# Patient Record
Sex: FEMALE | Race: WHITE | NOT HISPANIC OR LATINO | Employment: FULL TIME | URBAN - METROPOLITAN AREA
[De-identification: names, ages, dates, MRNs, and addresses within clinical notes are randomized per-mention and may not be internally consistent; named-entity substitution may affect disease eponyms.]

---

## 2023-01-19 ENCOUNTER — TRANSFERRED RECORDS (OUTPATIENT)
Dept: MULTI SPECIALTY CLINIC | Facility: CLINIC | Age: 23
End: 2023-01-19

## 2023-01-19 LAB — PAP SMEAR - HIM PATIENT REPORTED: NORMAL

## 2023-07-06 ENCOUNTER — TRANSFERRED RECORDS (OUTPATIENT)
Dept: MULTI SPECIALTY CLINIC | Facility: CLINIC | Age: 23
End: 2023-07-06

## 2023-07-06 LAB — CHLAMYDIA - HIM PATIENT REPORTED: NORMAL

## 2023-12-13 ENCOUNTER — TELEPHONE (OUTPATIENT)
Dept: OPHTHALMOLOGY | Facility: CLINIC | Age: 23
End: 2023-12-13
Payer: COMMERCIAL

## 2023-12-31 ENCOUNTER — HEALTH MAINTENANCE LETTER (OUTPATIENT)
Age: 23
End: 2023-12-31

## 2024-01-06 PROCEDURE — 99283 EMERGENCY DEPT VISIT LOW MDM: CPT | Performed by: EMERGENCY MEDICINE

## 2024-01-06 PROCEDURE — 99284 EMERGENCY DEPT VISIT MOD MDM: CPT | Mod: 25 | Performed by: EMERGENCY MEDICINE

## 2024-01-07 ENCOUNTER — HOSPITAL ENCOUNTER (EMERGENCY)
Facility: CLINIC | Age: 24
Discharge: HOME | End: 2024-01-07
Attending: EMERGENCY MEDICINE | Admitting: EMERGENCY MEDICINE
Payer: COMMERCIAL

## 2024-01-07 ENCOUNTER — APPOINTMENT (OUTPATIENT)
Dept: GENERAL RADIOLOGY | Facility: CLINIC | Age: 24
End: 2024-01-07
Attending: EMERGENCY MEDICINE
Payer: COMMERCIAL

## 2024-01-07 VITALS
SYSTOLIC BLOOD PRESSURE: 146 MMHG | TEMPERATURE: 97.5 F | RESPIRATION RATE: 25 BRPM | HEART RATE: 123 BPM | OXYGEN SATURATION: 95 % | DIASTOLIC BLOOD PRESSURE: 91 MMHG

## 2024-01-07 DIAGNOSIS — R05.3 CHRONIC COUGH: ICD-10-CM

## 2024-01-07 DIAGNOSIS — J45.30 MILD PERSISTENT ASTHMA WITHOUT COMPLICATION: ICD-10-CM

## 2024-01-07 LAB
FLUAV RNA SPEC QL NAA+PROBE: NEGATIVE
FLUBV RNA RESP QL NAA+PROBE: NEGATIVE
RSV RNA SPEC NAA+PROBE: NEGATIVE
SARS-COV-2 RNA RESP QL NAA+PROBE: NEGATIVE

## 2024-01-07 PROCEDURE — 71046 X-RAY EXAM CHEST 2 VIEWS: CPT | Mod: 26 | Performed by: RADIOLOGY

## 2024-01-07 PROCEDURE — 87637 SARSCOV2&INF A&B&RSV AMP PRB: CPT | Performed by: EMERGENCY MEDICINE

## 2024-01-07 PROCEDURE — 71046 X-RAY EXAM CHEST 2 VIEWS: CPT

## 2024-01-07 ASSESSMENT — ACTIVITIES OF DAILY LIVING (ADL): ADLS_ACUITY_SCORE: 33

## 2024-01-07 NOTE — ED TRIAGE NOTES
Pt has hx of asthma and has had a recent cold which is exacerbating symptoms, took steriods and abx. Unable to sleep d/t tachycardia and SOB. LS clear. Has home neb and took one around 1800. VSS-HTN.

## 2024-01-07 NOTE — ED PROVIDER NOTES
[facility] EMERGENCY DEPARTMENT ([facility])    1/07/24       ED PROVIDER NOTE    History     Chief Complaint   Patient presents with    Asthma Exacerbation     HPI  Idalmis Swann is a 23 year old female with a history of asthma here saying that she has long-haul COVID and this plus her asthma has caused a chronic cough and she has difficulty sleeping, particularly when she lies flat.  She does have an inhaler and neb machine at home. She has been seen multiple times placed on 2 courses of steroids as well as given azithromycin and apparently none of this has been helpful. She recently moved here and does not have a primary care provider. She has done a neb today.  Her cough is nonproductive.    This part of the medical record was transcribed by TOMAS ALSTON Medical Scribe, from a dictation done by Louis Hoffman MD.     Past Medical History  No past medical history on file.  No past surgical history on file.  dextromethorphan (TUSSIN COUGH) 15 MG/5ML syrup      Allergies   Allergen Reactions    Bactrim [Sulfamethoxazole-Trimethoprim]     Elemental Sulfur     Penicillins      Family History  No family history on file.  Social History          A medically appropriate review of systems was performed with pertinent positives and negatives noted in the HPI, and all other systems negative.    Physical Exam   BP: (!) 146/91  Pulse: (!) 123  Temp: 97.5  F (36.4  C)  Resp: 25  SpO2: 95 %  Physical Exam  Vitals and nursing note reviewed.   Constitutional:       General: She is not in acute distress.     Appearance: She is well-developed.   HENT:      Head: Normocephalic and atraumatic.   Cardiovascular:      Rate and Rhythm: Normal rate and regular rhythm.      Heart sounds: Normal heart sounds.   Pulmonary:      Effort: Pulmonary effort is normal. No respiratory distress.      Breath sounds: Normal breath sounds. No wheezing.   Skin:     General: Skin is warm and dry.   Neurological:      General:  No focal deficit present.      Mental Status: She is alert and oriented to person, place, and time.   Psychiatric:         Mood and Affect: Mood normal.         Behavior: Behavior normal.         ED Course, Procedures, & Data      Procedures                   Results for orders placed or performed during the hospital encounter of 01/07/24   XR Chest 2 Views     Status: None    Narrative    EXAM: XR CHEST 2 VIEWS  LOCATION: [facility]  DATE: 1/7/2024    INDICATION: Cough  COMPARISON: None.      Impression    IMPRESSION: Negative chest.   Symptomatic Influenza A/B, RSV, & SARS-CoV2 PCR (COVID-19) Nose     Status: Normal    Specimen: Nose; Swab   Result Value Ref Range    Influenza A PCR Negative Negative    Influenza B PCR Negative Negative    RSV PCR Negative Negative    SARS CoV2 PCR Negative Negative    Narrative    Testing was performed using the Xpert Xpress CoV2/Flu/RSV Assay on the Cepheid GeneXpert Instrument. This test should be ordered for the detection of SARS-CoV-2, influenza, and RSV viruses in individuals who meet clinical and/or epidemiological criteria. Test performance is unknown in asymptomatic patients. This test is for in vitro diagnostic use under the FDA EUA for laboratories certified under CLIA to perform high or moderate complexity testing. This test has not been FDA cleared or approved. A negative result does not rule out the presence of PCR inhibitors in the specimen or target RNA in concentration below the limit of detection for the assay. If only one viral target is positive but coinfection with multiple targets is suspected, the sample should be re-tested with another FDA cleared, approved, or authorized test, if coinfection would change clinical management. This test was validated by the [facility]. These laboratories are certified under the Clinical Laboratory Improvement Amendments of 1988 (CLIA-88) as qualified to  perform high complexity laboratory testing.     Medications - No data to display  Labs Ordered and Resulted from Time of ED Arrival to Time of ED Departure   INFLUENZA A/B, RSV, & SARS-COV2 PCR - Normal       Result Value    Influenza A PCR Negative      Influenza B PCR Negative      RSV PCR Negative      SARS CoV2 PCR Negative       XR Chest 2 Views   Final Result   IMPRESSION: Negative chest.             Critical care was not performed.     Medical Decision Making  The patient's presentation was of straightforward complexity (a clearly self-limited or minor problem).    The patient's evaluation involved:  ordering and/or review of 3+ test(s) in this encounter (chest x-ray, influenza, COVID)    The patient's management necessitated moderate risk (prescription drug management including medications given in the ED).    Assessment & Plan    23-year-old female with history of asthma here frustrated that her cough is keeping her awake at night.  Her chest x-ray is normal she does not have a lot of wheezing on exam and her COVID and influenza were negative.  Will provide her with a prescription for Robitussin AC to use at night as a cough suppressant.    I have reviewed the nursing notes. I have reviewed the findings, diagnosis, plan and need for follow up with the patient.    New Prescriptions    DEXTROMETHORPHAN (TUSSIN COUGH) 15 MG/5ML SYRUP    Take 10 mLs (30 mg) by mouth nightly as needed for cough       Final diagnoses:   Chronic cough   Mild persistent asthma without complication   ITOMAS am serving as a trained medical scribe to document services personally performed by Louis Hoffman MD, based on the provider's statements to me.      I, Louis Hoffman MD, was physically present and have reviewed and verified the accuracy of this note documented by TOMAS ALSTON.       Louis Hoffman MD  [facility] EMERGENCY DEPARTMENT  1/6/2024     Louis Hoffman,  MD  01/07/24 0139

## 2024-02-01 ASSESSMENT — ASTHMA QUESTIONNAIRES
EMERGENCY_ROOM_LAST_YEAR_TOTAL: TWO
QUESTION_2 LAST FOUR WEEKS HOW OFTEN HAVE YOU HAD SHORTNESS OF BREATH: ONCE OR TWICE A WEEK
QUESTION_1 LAST FOUR WEEKS HOW MUCH OF THE TIME DID YOUR ASTHMA KEEP YOU FROM GETTING AS MUCH DONE AT WORK, SCHOOL OR AT HOME: A LITTLE OF THE TIME
QUESTION_3 LAST FOUR WEEKS HOW OFTEN DID YOUR ASTHMA SYMPTOMS (WHEEZING, COUGHING, SHORTNESS OF BREATH, CHEST TIGHTNESS OR PAIN) WAKE YOU UP AT NIGHT OR EARLIER THAN USUAL IN THE MORNING: ONCE A WEEK
ACT_TOTALSCORE: 17
QUESTION_5 LAST FOUR WEEKS HOW WOULD YOU RATE YOUR ASTHMA CONTROL: SOMEWHAT CONTROLLED
QUESTION_4 LAST FOUR WEEKS HOW OFTEN HAVE YOU USED YOUR RESCUE INHALER OR NEBULIZER MEDICATION (SUCH AS ALBUTEROL): TWO OR THREE TIMES PER WEEK
ACT_TOTALSCORE: 17

## 2024-02-07 ENCOUNTER — OFFICE VISIT (OUTPATIENT)
Dept: FAMILY MEDICINE | Facility: CLINIC | Age: 24
End: 2024-02-07
Payer: COMMERCIAL

## 2024-02-07 VITALS
BODY MASS INDEX: 35.26 KG/M2 | DIASTOLIC BLOOD PRESSURE: 80 MMHG | HEIGHT: 66 IN | SYSTOLIC BLOOD PRESSURE: 122 MMHG | TEMPERATURE: 97.7 F | OXYGEN SATURATION: 98 % | HEART RATE: 100 BPM | RESPIRATION RATE: 18 BRPM | WEIGHT: 219.4 LBS

## 2024-02-07 DIAGNOSIS — Z91.018 NUT ALLERGY: ICD-10-CM

## 2024-02-07 DIAGNOSIS — J45.20 MILD INTERMITTENT ASTHMA WITHOUT COMPLICATION: ICD-10-CM

## 2024-02-07 DIAGNOSIS — Z13.21 ENCOUNTER FOR VITAMIN DEFICIENCY SCREENING: ICD-10-CM

## 2024-02-07 DIAGNOSIS — Z11.4 SCREENING FOR HIV (HUMAN IMMUNODEFICIENCY VIRUS): ICD-10-CM

## 2024-02-07 DIAGNOSIS — Z11.59 NEED FOR HEPATITIS C SCREENING TEST: ICD-10-CM

## 2024-02-07 DIAGNOSIS — D50.8 OTHER IRON DEFICIENCY ANEMIA: ICD-10-CM

## 2024-02-07 DIAGNOSIS — Z13.220 SCREENING FOR HYPERLIPIDEMIA: ICD-10-CM

## 2024-02-07 DIAGNOSIS — Z13.29 SCREENING FOR THYROID DISORDER: ICD-10-CM

## 2024-02-07 DIAGNOSIS — E66.01 CLASS 2 SEVERE OBESITY DUE TO EXCESS CALORIES WITH SERIOUS COMORBIDITY AND BODY MASS INDEX (BMI) OF 35.0 TO 35.9 IN ADULT (H): ICD-10-CM

## 2024-02-07 DIAGNOSIS — Z11.3 SCREEN FOR STD (SEXUALLY TRANSMITTED DISEASE): ICD-10-CM

## 2024-02-07 DIAGNOSIS — E66.812 CLASS 2 SEVERE OBESITY DUE TO EXCESS CALORIES WITH SERIOUS COMORBIDITY AND BODY MASS INDEX (BMI) OF 35.0 TO 35.9 IN ADULT (H): ICD-10-CM

## 2024-02-07 DIAGNOSIS — Z00.00 ROUTINE GENERAL MEDICAL EXAMINATION AT A HEALTH CARE FACILITY: Primary | ICD-10-CM

## 2024-02-07 DIAGNOSIS — N80.9 ENDOMETRIOSIS: ICD-10-CM

## 2024-02-07 PROBLEM — M50.30 DISCOGENIC CERVICAL PAIN: Status: ACTIVE | Noted: 2020-10-28

## 2024-02-07 PROBLEM — J45.909 REACTIVE AIRWAY DISEASE: Status: ACTIVE | Noted: 2022-09-07

## 2024-02-07 PROBLEM — Z74.09 COVID-19 LONG HAULER MANIFESTING CHRONIC DECREASED MOBILITY AND ENDURANCE: Status: ACTIVE | Noted: 2022-12-12

## 2024-02-07 PROBLEM — U09.9 CHRONIC POST-COVID-19 SYNDROME: Status: ACTIVE | Noted: 2022-09-07

## 2024-02-07 PROBLEM — J30.2 SEASONAL ALLERGIC RHINITIS: Status: ACTIVE | Noted: 2022-05-04

## 2024-02-07 PROBLEM — M43.07 SPONDYLOLYSIS OF LUMBOSACRAL REGION: Status: ACTIVE | Noted: 2019-02-23

## 2024-02-07 PROBLEM — E78.2 MIXED DYSLIPIDEMIA: Status: ACTIVE | Noted: 2020-11-25

## 2024-02-07 PROBLEM — N83.209 HEMORRHAGIC OVARIAN CYST: Status: ACTIVE | Noted: 2021-03-03

## 2024-02-07 PROBLEM — U09.9 COVID-19 LONG HAULER MANIFESTING CHRONIC DECREASED MOBILITY AND ENDURANCE: Status: ACTIVE | Noted: 2022-12-12

## 2024-02-07 PROBLEM — G47.09 OTHER INSOMNIA: Status: ACTIVE | Noted: 2021-11-01

## 2024-02-07 PROBLEM — E01.0 THYROMEGALY: Status: ACTIVE | Noted: 2021-11-01

## 2024-02-07 PROBLEM — M22.42 CHONDROMALACIA PATELLAE, LEFT KNEE: Status: ACTIVE | Noted: 2020-10-28

## 2024-02-07 PROBLEM — G43.119 INTRACTABLE MIGRAINE WITH AURA WITHOUT STATUS MIGRAINOSUS: Status: ACTIVE | Noted: 2020-10-28

## 2024-02-07 LAB
C TRACH DNA SPEC QL PROBE+SIG AMP: NEGATIVE
ERYTHROCYTE [DISTWIDTH] IN BLOOD BY AUTOMATED COUNT: 13.4 % (ref 10–15)
HCT VFR BLD AUTO: 39.1 % (ref 35–47)
HGB BLD-MCNC: 13.3 G/DL (ref 11.7–15.7)
HIV 1+2 AB+HIV1 P24 AG SERPL QL IA: NONREACTIVE
MCH RBC QN AUTO: 29 PG (ref 26.5–33)
MCHC RBC AUTO-ENTMCNC: 34 G/DL (ref 31.5–36.5)
MCV RBC AUTO: 85 FL (ref 78–100)
N GONORRHOEA DNA SPEC QL NAA+PROBE: NEGATIVE
PLATELET # BLD AUTO: 360 10E3/UL (ref 150–450)
RBC # BLD AUTO: 4.59 10E6/UL (ref 3.8–5.2)
WBC # BLD AUTO: 13.3 10E3/UL (ref 4–11)

## 2024-02-07 PROCEDURE — 80061 LIPID PANEL: CPT | Performed by: STUDENT IN AN ORGANIZED HEALTH CARE EDUCATION/TRAINING PROGRAM

## 2024-02-07 PROCEDURE — 87491 CHLMYD TRACH DNA AMP PROBE: CPT | Performed by: STUDENT IN AN ORGANIZED HEALTH CARE EDUCATION/TRAINING PROGRAM

## 2024-02-07 PROCEDURE — 87389 HIV-1 AG W/HIV-1&-2 AB AG IA: CPT | Performed by: STUDENT IN AN ORGANIZED HEALTH CARE EDUCATION/TRAINING PROGRAM

## 2024-02-07 PROCEDURE — 99213 OFFICE O/P EST LOW 20 MIN: CPT | Mod: 25 | Performed by: STUDENT IN AN ORGANIZED HEALTH CARE EDUCATION/TRAINING PROGRAM

## 2024-02-07 PROCEDURE — 82306 VITAMIN D 25 HYDROXY: CPT | Performed by: STUDENT IN AN ORGANIZED HEALTH CARE EDUCATION/TRAINING PROGRAM

## 2024-02-07 PROCEDURE — 99385 PREV VISIT NEW AGE 18-39: CPT | Performed by: STUDENT IN AN ORGANIZED HEALTH CARE EDUCATION/TRAINING PROGRAM

## 2024-02-07 PROCEDURE — 84443 ASSAY THYROID STIM HORMONE: CPT | Performed by: STUDENT IN AN ORGANIZED HEALTH CARE EDUCATION/TRAINING PROGRAM

## 2024-02-07 PROCEDURE — 86803 HEPATITIS C AB TEST: CPT | Performed by: STUDENT IN AN ORGANIZED HEALTH CARE EDUCATION/TRAINING PROGRAM

## 2024-02-07 PROCEDURE — 80053 COMPREHEN METABOLIC PANEL: CPT | Performed by: STUDENT IN AN ORGANIZED HEALTH CARE EDUCATION/TRAINING PROGRAM

## 2024-02-07 PROCEDURE — 87591 N.GONORRHOEAE DNA AMP PROB: CPT | Performed by: STUDENT IN AN ORGANIZED HEALTH CARE EDUCATION/TRAINING PROGRAM

## 2024-02-07 PROCEDURE — 36415 COLL VENOUS BLD VENIPUNCTURE: CPT | Performed by: STUDENT IN AN ORGANIZED HEALTH CARE EDUCATION/TRAINING PROGRAM

## 2024-02-07 PROCEDURE — 85027 COMPLETE CBC AUTOMATED: CPT | Performed by: STUDENT IN AN ORGANIZED HEALTH CARE EDUCATION/TRAINING PROGRAM

## 2024-02-07 RX ORDER — EPINEPHRINE 0.3 MG/.3ML
0.3 INJECTION SUBCUTANEOUS PRN
Qty: 2 EACH | Refills: 1 | Status: SHIPPED | OUTPATIENT
Start: 2024-02-07 | End: 2024-06-05

## 2024-02-07 RX ORDER — METFORMIN HCL 500 MG
500 TABLET, EXTENDED RELEASE 24 HR ORAL
Qty: 14 TABLET | Refills: 0 | Status: SHIPPED | OUTPATIENT
Start: 2024-02-07 | End: 2024-02-20

## 2024-02-07 RX ORDER — EPINEPHRINE 0.3 MG/.3ML
0.3 INJECTION SUBCUTANEOUS
COMMUNITY
Start: 2023-02-22 | End: 2024-02-07

## 2024-02-07 RX ORDER — FLUTICASONE PROPIONATE AND SALMETEROL 232; 14 UG/1; UG/1
1 POWDER, METERED RESPIRATORY (INHALATION) 2 TIMES DAILY
Qty: 1 EACH | Refills: 3 | Status: SHIPPED | OUTPATIENT
Start: 2024-02-07

## 2024-02-07 RX ORDER — DROSPIRENONE AND ETHINYL ESTRADIOL 0.02-3(28)
KIT ORAL
COMMUNITY
Start: 2022-05-01 | End: 2024-07-29

## 2024-02-07 NOTE — PATIENT INSTRUCTIONS
Preventive Care Advice   This is general advice given by our system to help you stay healthy. However, your care team may have specific advice just for you. Please talk to your care team about your preventive care needs.  Nutrition  Eat 5 or more servings of fruits and vegetables each day.  Try wheat bread, brown rice and whole grain pasta (instead of white bread, rice, and pasta).  Get enough calcium and vitamin D. Check the label on foods and aim for 100% of the RDA (recommended daily allowance).  Lifestyle  Exercise at least 150 minutes each week  (30 minutes a day, 5 days a week).  Do muscle strengthening activities 2 days a week. These help control your weight and prevent disease.  No smoking.  Wear sunscreen to prevent skin cancer.  Have a dental exam and cleaning every 6 months.  Yearly exams  See your health care team every year to talk about:  Any changes in your health.  Any medicines your care team has prescribed.  Preventive care, family planning, and ways to prevent chronic diseases.  Shots (vaccines)   HPV shots (up to age 26), if you've never had them before.  Hepatitis B shots (up to age 59), if you've never had them before.  COVID-19 shot: Get this shot when it's due.  Flu shot: Get a flu shot every year.  Tetanus shot: Get a tetanus shot every 10 years.  Pneumococcal, hepatitis A, and RSV shots: Ask your care team if you need these based on your risk.  Shingles shot (for age 50 and up)  General health tests  Diabetes screening:  Starting at age 35, Get screened for diabetes at least every 3 years.  If you are younger than age 35, ask your care team if you should be screened for diabetes.  Cholesterol test: At age 39, start having a cholesterol test every 5 years, or more often if advised.  Bone density scan (DEXA): At age 50, ask your care team if you should have this scan for osteoporosis (brittle bones).  Hepatitis C: Get tested at least once in your life.  STIs (sexually transmitted  infections)  Before age 24: Ask your care team if you should be screened for STIs.  After age 24: Get screened for STIs if you're at risk. You are at risk for STIs (including HIV) if:  You are sexually active with more than one person.  You don't use condoms every time.  You or a partner was diagnosed with a sexually transmitted infection.  If you are at risk for HIV, ask about PrEP medicine to prevent HIV.  Get tested for HIV at least once in your life, whether you are at risk for HIV or not.  Cancer screening tests  Cervical cancer screening: If you have a cervix, begin getting regular cervical cancer screening tests starting at age 21.  Breast cancer scan (mammogram): If you've ever had breasts, begin having regular mammograms starting at age 40. This is a scan to check for breast cancer.  Colon cancer screening: It is important to start screening for colon cancer at age 45.  Have a colonoscopy test every 10 years (or more often if you're at risk) Or, ask your provider about stool tests like a FIT test every year or Cologuard test every 3 years.  To learn more about your testing options, visit:   https://www.Netshow.me/728842.pdf.  For help making a decision, visit:   https://bit.ly/gx29568.  Prostate cancer screening test: If you have a prostate, ask your care team if a prostate cancer screening test (PSA) at age 55 is right for you.  Lung cancer screening: If you are a current or former smoker ages 50 to 80, ask your care team if ongoing lung cancer screenings are right for you.  For informational purposes only. Not to replace the advice of your health care provider. Copyright   2023 [employer]. All rights reserved. Clinically reviewed by the [facility] Transitions Program. [employer] [id number] - REV 01/24.

## 2024-02-07 NOTE — PROGRESS NOTES
Preventive Care Visit  [facility], Family Medicine  Feb 7, 2024    Assessment & Plan     (Z00.00) Routine general medical examination at a health care facility  (primary encounter diagnosis)  Comment: Stable  Plan: REVIEW OF HEALTH MAINTENANCE PROTOCOL ORDERS,         Comprehensive metabolic panel (BMP + Alb, Alk         Phos, ALT, AST, Total. Bili, TP)            (Z11.4) Screening for HIV (human immunodeficiency virus)  Comment: Stable  Plan: HIV Antigen Antibody Combo            (Z11.59) Need for hepatitis C screening test  Comment: Stable  Plan: Hepatitis C Screen Reflex to HCV RNA Quant and         Genotype            (N80.9) Endometriosis  Comment: Chronic, uncontrolled.  Patient like to establish care with OB/GYN.  Patient does have concerns for possible PCOS and would like further evaluation.  Will provide referral.  Plan: Ob/Gyn  Referral            (J45.20) Mild intermittent asthma without complication  Comment: Chronic, stable.  Will send patient refill of medication.  Plan: fluticasone-salmeterol (AIRDUO RESPICLICK)         232-14 MCG/ACT inhaler            (Z13.220) Screening for hyperlipidemia  Comment: Stable  Plan: Lipid panel reflex to direct LDL Fasting            (Z11.3) Screen for STD (sexually transmitted disease)  Comment: Stable  Plan: Chlamydia trachomatis/Neisseria gonorrhoeae by         PCR - Clinic Collect            (Z13.29) Screening for thyroid disorder  Comment: Stable  Plan: TSH with free T4 reflex            (D50.8) Other iron deficiency anemia  Comment: Chronic, stable.  Pending lab results today.  Plan: CBC with platelets            (E66.01,  Z68.35) Class 2 severe obesity due to excess calories with serious comorbidity and body mass index (BMI) of 35.0 to 35.9 in adult (H)  Comment: Chronic, uncontrolled.  Discussed concerns as a relates to weight and patient's underlying history of endometriosis.  Patient has been seen by  "nutritionist and understands lifestyle changes are important for overall health.  Did discuss options of weight management medications including metformin, phentermine, topiramate.  Patient in agreement to try metformin at this time and will start off with the 500 mg extended release for 2 weeks.  Patient to notify via Mashed jobshart if tolerance is present and will increase dose to 1000 mg.  Plan: metFORMIN (GLUCOPHAGE XR) 500 MG 24 hr tablet            (Z13.21) Encounter for vitamin deficiency screening  Comment: Stable  Plan: Vitamin D Deficiency            (Z91.018) Nut allergy  Comment: Chronic, stable.  Will send refills of EpiPen.  Plan: EPINEPHrine (ANY BX GENERIC EQUIV) 0.3 MG/0.3ML        injection 2-pack             Dictation Disclaimer: Some of this Note has been completed with voice-recognition dictation software. Although errors are generally corrected real-time, there is the potential for a rare error to be present in the completed chart.             BMI  Estimated body mass index is 35.95 kg/m  as calculated from the following:    Height as of this encounter: 1.664 m (5' 5.5\").    Weight as of this encounter: 99.5 kg (219 lb 6.4 oz).   Weight management plan: Specific weight management program called metformin  discussed          Subjective   Idalmis is a 23 year old, presenting for the following:  Physical        2/7/2024     7:20 AM   Additional Questions   Roomed by Ade        Via the Health Maintenance questionnaire, the patient has reported the following services have been completed -Chlamydia-Cervical Cancer Screening, this information has been sent to the abstraction team.  Health Care Directive  Patient does not have a Health Care Directive or Living Will: Discussed advance care planning with patient; information given to patient to review.    History of Present Illness       Reason for visit:  New patient visit    She eats 4 or more servings of fruits and vegetables daily.She consumes 1 sweetened " beverage(s) daily.She exercises with enough effort to increase her heart rate 60 or more minutes per day.  She exercises with enough effort to increase her heart rate 4 days per week.   She is taking medications regularly.  She is not taking prescribed medications regularly due to None.  Healthy Habits:     Getting at least 3 servings of Calcium per day:  Yes    Bi-annual eye exam:  Yes    Dental care twice a year:  Yes    Sleep apnea or symptoms of sleep apnea:  None    Diet:  Regular (no restrictions)    Frequency of exercise:  4-5 days/week    Duration of exercise:  Greater than 60 minutes    Taking medications regularly:  0    Barriers to taking medications:  None    Medication side effects:  None    Additional concerns today:  Yes    The patient is a 23-year-old female who presents for routine physical and evaluation of medical concerns.    She had some health issues in the past year after she received her COVID-19 shot.  She endorses having stage IV endometriosis and was seeing an OB/GYN in [place] but has not been able to establish care with a provider here who specializes in endometriosis.  She has been taking birth control consistently since she was 15.  She has tried an IUD, but she is allergic to it.  Depo shot her arm concerns her and she has not consider this as an option.  She had some weight issues since she was diagnosed with endometriosis.  She has met with a nutritionist through her insurance.  Her weight is usually 1 60-1 70 however she has noticed that she is the highest that she has been at 219.  She works out 4 times a week and eats a high-protein diet.  She currently now suffers from a nut allergy which surfaced after she received her COVID-19 shot.  She reports undergoing surgery for endometriosis due to seeding into the colon however has not follow-up since that time.  She is hoping to figure out ways to improve her weight as well as alternatives for birth control.  She reports that if  she were to get off birth control she would experience heavy bleeding, clotting, pain in her buttocks.    She has a history of asthma and had an asthma [place]  She was at the hospital and they did perform an EKG.  She was informed by her biological father that he suffers from cardiomyopathy.  She is requesting a refill of her air duo albuterol inhaler.  She is interested in seeing a pulmonologist that she has establish care with 1 in [place] and is hoping to find one here in [place].    Her cholesterol and LDLs have historically been elevated.  Her previous primary provider did recommend that she try medications however she is more interested in trying natural remedies.    She endorses a history of seeing a hematologist due to anemia.  She reports having undergone bone marrow testing which did come back normal.  She reports having consistent feelings of tiredness which she does not know if it is attributed to her work environment, low iron, low vitamin D or combination of all.              2/1/2024   Social Factors   Worry food won't last until get money to buy more No   Food not last or not have enough money for food? No   Do you have housing?  Yes   Are you worried about losing your housing? No   Lack of transportation? No   Unable to get utilities (heat,electricity)? No             Today's PHQ-2 Score:       2/7/2024     7:34 AM   PHQ-2 ( 1999 Pfizer)   Q1: Little interest or pleasure in doing things 0   Q2: Feeling down, depressed or hopeless 0   PHQ-2 Score 0       Social History     Tobacco Use    Smoking status: Never    Smokeless tobacco: Never   Vaping Use    Vaping Use: Never used         History of abnormal Pap smear: NO - age 21-29 PAP every 3 years recommended              Reviewed and updated as needed this visit by Provider                    Lab work is in process  Review of Systems   ROS: 10 point ROS neg other than the symptoms noted  "above in the HPI.       Objective    Exam  /80   Pulse 100   Temp 97.7  F (36.5  C) (Temporal)   Resp 18   Ht 1.664 m (5' 5.5\")   Wt 99.5 kg (219 lb 6.4 oz)   SpO2 98%   BMI 35.95 kg/m     Estimated body mass index is 35.95 kg/m  as calculated from the following:    Height as of this encounter: 1.664 m (5' 5.5\").    Weight as of this encounter: 99.5 kg (219 lb 6.4 oz).    Physical Exam  GENERAL: healthy, alert and no distress  EYES: Eyes grossly normal to inspection, PERRL and conjunctivae and sclerae normal  Neck: No visible JVD or lymphadenopathy   RESP: symmetrical rise in chest   CV: No peripheral edema notable   MS: no gross musculoskeletal defects noted  SKIN: no suspicious lesions or rashes  PSYCH: mentation appears normal, affect normal/bright        Signed Electronically by: JULIENNE VASQUEZ MD    "

## 2024-02-07 NOTE — LETTER
My Asthma Action Plan    Name: Idalmis Saraviargiassi   YOB: 2000  Date: 2/7/2024   My doctor: JULIENNE VASQUEZ MD   My clinic: [facility]        My Rescue Medicine:   Albuterol inhaler (Proair/Ventolin/Proventil HFA)  2-4 puffs EVERY 4 HOURS as needed. Use a spacer if recommended by your provider.   My Asthma Severity:   Intermittent / Exercise Induced  Know your asthma triggers: upper respiratory infections and dust mites             GREEN ZONE   Good Control  I feel good  No cough or wheeze  Can work, sleep and play without asthma symptoms       Take your asthma control medicine every day.     If exercise triggers your asthma, take your rescue medication  15 minutes before exercise or sports, and  During exercise if you have asthma symptoms  Spacer to use with inhaler: If you have a spacer, make sure to use it with your inhaler             YELLOW ZONE Getting Worse  I have ANY of these:  I do not feel good  Cough or wheeze  Chest feels tight  Wake up at night   Keep taking your Green Zone medications  Start taking your rescue medicine:  every 20 minutes for up to 1 hour. Then every 4 hours for 24-48 hours.  If you stay in the Yellow Zone for more than 12-24 hours, contact your doctor.  If you do not return to the Green Zone in 12-24 hours or you get worse, start taking your oral steroid medicine if prescribed by your provider.           RED ZONE Medical Alert - Get Help  I have ANY of these:  I feel awful  Medicine is not helping  Breathing getting harder  Trouble walking or talking  Nose opens wide to breathe       Take your rescue medicine NOW  If your provider has prescribed an oral steroid medicine, start taking it NOW  Call your doctor NOW  If you are still in the Red Zone after 20 minutes and you have not reached your doctor:  Take your rescue medicine again and  Call 911 or go to the emergency room right away    See your regular doctor within 2 weeks of an Emergency Room  or Urgent Care visit for follow-up treatment.          Annual Reminders:  Meet with Asthma Educator,  Flu Shot in the Fall, consider Pneumonia Vaccination for patients with asthma (aged 19 and older).    Pharmacy: [id number] [place], [place] - [address]    Electronically signed by JULIENNE VASQUEZ MD   Date: 02/07/24                    Asthma Triggers  How To Control Things That Make Your Asthma Worse    Triggers are things that make your asthma worse.  Look at the list below to help you find your triggers and   what you can do about them. You can help prevent asthma flare-ups by staying away from your triggers.      Trigger                                                          What you can do   Cigarette Smoke  Tobacco smoke can make asthma worse. Do not allow smoking in your home, car or around you.  Be sure no one smokes at a child s day care or school.  If you smoke, ask your health care provider for ways to help you quit.  Ask family members to quit too.  Ask your health care provider for a referral to Quit Plan to help you quit smoking, or call 6-999-686-PLAN.     Colds, Flu, Bronchitis  These are common triggers of asthma. Wash your hands often.  Don t touch your eyes, nose or mouth.  Get a flu shot every year.     Dust Mites  These are tiny bugs that live in cloth or carpet. They are too small to see. Wash sheets and blankets in hot water every week.   Encase pillows and mattress in dust mite proof covers.  Avoid having carpet if you can. If you have carpet, vacuum weekly.   Use a dust mask and HEPA vacuum.   Pollen and Outdoor Mold  Some people are allergic to trees, grass, or weed pollen, or molds. Try to keep your windows closed.  Limit time out doors when pollen count is high.   Ask you health care provider about taking medicine during allergy season.     Animal Dander  Some people are allergic to skin flakes, urine or saliva from pets with fur or feathers. Keep pets with fur or  feathers out of your home.    If you can t keep the pet outdoors, then keep the pet out of your bedroom.  Keep the bedroom door closed.  Keep pets off cloth furniture and away from stuffed toys.     Mice, Rats, and Cockroaches  Some people are allergic to the waste from these pests.   Cover food and garbage.  Clean up spills and food crumbs.  Store grease in the refrigerator.   Keep food out of the bedroom.   Indoor Mold  This can be a trigger if your home has high moisture. Fix leaking faucets, pipes, or other sources of water.   Clean moldy surfaces.  Dehumidify basement if it is damp and smelly.   Smoke, Strong Odors, and Sprays  These can reduce air quality. Stay away from strong odors and sprays, such as perfume, powder, hair spray, paints, smoke incense, paint, cleaning products, candles and new carpet.   Exercise or Sports  Some people with asthma have this trigger. Be active!  Ask your doctor about taking medicine before sports or exercise to prevent symptoms.    Warm up for 5-10 minutes before and after sports or exercise.     Other Triggers of Asthma  Cold air:  Cover your nose and mouth with a scarf.  Sometimes laughing or crying can be a trigger.  Some medicines and food can trigger asthma.

## 2024-02-08 LAB
ALBUMIN SERPL BCG-MCNC: 4.1 G/DL (ref 3.5–5.2)
ALP SERPL-CCNC: 68 U/L (ref 40–150)
ALT SERPL W P-5'-P-CCNC: 14 U/L (ref 0–50)
ANION GAP SERPL CALCULATED.3IONS-SCNC: 12 MMOL/L (ref 7–15)
AST SERPL W P-5'-P-CCNC: 18 U/L (ref 0–45)
BILIRUB SERPL-MCNC: 0.3 MG/DL
BUN SERPL-MCNC: 10.6 MG/DL (ref 6–20)
CALCIUM SERPL-MCNC: 9.5 MG/DL (ref 8.6–10)
CHLORIDE SERPL-SCNC: 104 MMOL/L (ref 98–107)
CHOLEST SERPL-MCNC: 265 MG/DL
CREAT SERPL-MCNC: 0.76 MG/DL (ref 0.51–0.95)
DEPRECATED HCO3 PLAS-SCNC: 22 MMOL/L (ref 22–29)
EGFRCR SERPLBLD CKD-EPI 2021: >90 ML/MIN/1.73M2
FASTING STATUS PATIENT QL REPORTED: YES
GLUCOSE SERPL-MCNC: 88 MG/DL (ref 70–99)
HCV AB SERPL QL IA: NONREACTIVE
HDLC SERPL-MCNC: 81 MG/DL
LDLC SERPL CALC-MCNC: 148 MG/DL
NONHDLC SERPL-MCNC: 184 MG/DL
POTASSIUM SERPL-SCNC: 4.8 MMOL/L (ref 3.4–5.3)
PROT SERPL-MCNC: 7.1 G/DL (ref 6.4–8.3)
SODIUM SERPL-SCNC: 138 MMOL/L (ref 135–145)
TRIGL SERPL-MCNC: 180 MG/DL
TSH SERPL DL<=0.005 MIU/L-ACNC: 2.73 UIU/ML (ref 0.3–4.2)
VIT D+METAB SERPL-MCNC: 38 NG/ML (ref 20–50)

## 2024-02-13 ENCOUNTER — TELEPHONE (OUTPATIENT)
Dept: FAMILY MEDICINE | Facility: CLINIC | Age: 24
End: 2024-02-13
Payer: COMMERCIAL

## 2024-02-13 DIAGNOSIS — J45.20 MILD INTERMITTENT ASTHMA WITHOUT COMPLICATION: Primary | ICD-10-CM

## 2024-02-13 RX ORDER — FLUTICASONE PROPIONATE AND SALMETEROL 500; 50 UG/1; UG/1
1 POWDER RESPIRATORY (INHALATION) EVERY 12 HOURS
Qty: 60 EACH | Refills: 1 | Status: SHIPPED | OUTPATIENT
Start: 2024-02-13 | End: 2024-07-29

## 2024-02-13 NOTE — TELEPHONE ENCOUNTER
Brand Airduo respiclick is not covered. Can you switch to something else.    [facility]: [phone]

## 2024-02-19 DIAGNOSIS — E66.01 CLASS 2 SEVERE OBESITY DUE TO EXCESS CALORIES WITH SERIOUS COMORBIDITY AND BODY MASS INDEX (BMI) OF 35.0 TO 35.9 IN ADULT (H): ICD-10-CM

## 2024-02-19 DIAGNOSIS — E66.812 CLASS 2 SEVERE OBESITY DUE TO EXCESS CALORIES WITH SERIOUS COMORBIDITY AND BODY MASS INDEX (BMI) OF 35.0 TO 35.9 IN ADULT (H): ICD-10-CM

## 2024-02-20 RX ORDER — METFORMIN HCL 500 MG
TABLET, EXTENDED RELEASE 24 HR ORAL
Qty: 30 TABLET | Refills: 0 | Status: SHIPPED | OUTPATIENT
Start: 2024-02-20 | End: 2024-03-13

## 2024-03-06 ENCOUNTER — OFFICE VISIT (OUTPATIENT)
Dept: OBGYN | Facility: CLINIC | Age: 24
End: 2024-03-06
Attending: STUDENT IN AN ORGANIZED HEALTH CARE EDUCATION/TRAINING PROGRAM
Payer: COMMERCIAL

## 2024-03-06 VITALS
DIASTOLIC BLOOD PRESSURE: 83 MMHG | WEIGHT: 216.8 LBS | SYSTOLIC BLOOD PRESSURE: 122 MMHG | OXYGEN SATURATION: 96 % | HEART RATE: 99 BPM | BODY MASS INDEX: 35.53 KG/M2

## 2024-03-06 DIAGNOSIS — N92.1 BREAKTHROUGH BLEEDING ON BIRTH CONTROL PILLS: ICD-10-CM

## 2024-03-06 DIAGNOSIS — N80.9 ENDOMETRIOSIS: ICD-10-CM

## 2024-03-06 DIAGNOSIS — L70.9 ACNE, UNSPECIFIED ACNE TYPE: Primary | ICD-10-CM

## 2024-03-06 PROCEDURE — 99204 OFFICE O/P NEW MOD 45 MIN: CPT | Performed by: OBSTETRICS & GYNECOLOGY

## 2024-03-06 RX ORDER — DROSPIRENONE AND ETHINYL ESTRADIOL 0.02-3(28)
1 KIT ORAL DAILY
Qty: 112 TABLET | Refills: 3 | Status: SHIPPED | OUTPATIENT
Start: 2024-03-06 | End: 2024-05-22

## 2024-03-06 NOTE — PROGRESS NOTES
Idalmis is a 23 year old  female .  I have been asked to see patient in consultation by Dr. Garcia regarding endometriosis.  She is on continuous OCPs.  She has not missed pills.  She has had breakthrough bleeding.  She does not know when this may happen.   She has been on OCPs since 15 years old.  She had a diagnostic laparoscopy for pelvic pain and was noted to have stage 2/3 endometriosis.  Fulguration was performed.   She then started the OCPs in a continuous fashion.   She has her photos on her phone, from the laparoscopy.  My impression is that the findings are consistent with endometriosis.    She has also used the IUD for the endometriosis, but this did not work out and it did not help.      She had an ultrasound in  with her ob/gyn and the following is noted.    US TRANSABDOMINAL/TRANSVAGINAL PELVIS W/ DOPPLER    FINAL RESULT BY: Karen Messina MD  DICTATED: 2021 8:49 AM  Narrative  EXAM:  PELVIC ULTRASOUND  CLINICAL INDICATION:  ruptured ovarian cyst/ pelvic pain  TECHNIQUE:  Transabdominal and transvaginal pelvic ultrasound.  Transvaginal imaging was performed to better evaluate the endometrium and/or deep pelvic structures.  COMPARISON:  CT 2021.  LMP: 2021  FINDINGS:  Uterus:  Size (cm): 8.5 x 4.0 x 5.7  Endometrial thickness (mm): 12  Endometrium and Myometrium: No significant abnormalities and no masses.  Cervix: No significant abnormalities.  Right Adnexa: No significant abnormalities.  Right Ovary Size (cm): 1.6 x 2.1 x 1.7  Left Adnexa: 1.4 x 1.1 x 1.5 cm cyst with few internal septations  Left Ovary Size (cm): 3.5 x 1.8 x 3.6  Other: Small amount of free fluid within the cul-de-sac.  Impression  Pelvic Ultrasound: Probable small hemorrhagic left adnexal cyst.  INDETERMINATE, LIKELY BENIGN OVARIAN CYST IN PREMENOPAUSAL PATIENT:  Follow-up US should be performed in 6-12 weeks. Resolution of the lesion confirms a hemorrhagic cyst. If the lesion is unchanged, then hemorrhagic cyst is  unlikely, and would suggest follow-up with either US or MRI pelvis with and without contrast. If these studies do not confirm an endometrioma or dermoid, then surgical evaluation should be considered.    Adapted from: Management of Asymptomatic Ovarian and other Adnexal Cysts Imaged at US: Society of Radiologists in Ultrasound Consensus Conference Statement, Radiology: Volume 256: Number 3 - 2010      She is also wondering about possible PCOS.  The [facility] Everywhere records do not allow my to view the films and I am not able to determine morphology.  She reports that she had acne was a teen and the facial acne improved.  She has had some acne with her back.  She has also had weight gain.   She has not had unusual hair growth (male pattern).  We reviewed the androgen excess with PCOS.  The sex hormone binding globulin is stimulated by the estrogen, and with being on the OCPs, this should help manage PCOS.  The OCPs will help keep the ovarian capsule thinner, to aid with ovulation induction, if needed.            Past Medical History:   Diagnosis Date    Anemia, iron deficiency     Endometriosis 2021    documented by photo/visualization.  no biopsy performed.       Past Surgical History:   Procedure Laterality Date    LAPAROSCOPY DIAGNOSTIC (GYN)  2021    Surgery to remove endometriosis unable to remove all. Some on colon    TONSILLECTOMY & ADENOIDECTOMY  2023       OB History    Para Term  AB Living   0 0 0 0 0 0   SAB IAB Ectopic Multiple Live Births   0 0 0 0 0       Gynecological History            Patient's last menstrual period was 05/10/2023.     No STD/No PID/No IUD   She has not had a pap smear yet.       see above HPI        Allergies   Allergen Reactions    Cat Hair Extract Shortness Of Breath    Vitex Swelling     Hazelnut tree allergy skin test    Bactrim [Sulfamethoxazole-Trimethoprim]     Elemental Sulfur     Penicillins     Sulfa Antibiotics Nausea and Vomiting    Tree  Extract      Hazelnut tree allergy skin test    Alternaria Alternata Allergy Skin Test Rash    Dog Epithelium Allergy Skin Test Itching    Latex Rash    Mold Cough       Current Outpatient Medications   Medication Sig Dispense Refill    drospirenone-ethinyl estradiol (JAMIE) 3-0.02 MG tablet Take 1 tablet by mouth daily On day 25, discard the inactive pills and start new pack 112 tablet 3    drospirenone-ethinyl estradiol (JAMIE) 3-0.02 MG tablet       EPINEPHrine (ANY BX GENERIC EQUIV) 0.3 MG/0.3ML injection 2-pack Inject 0.3 mLs (0.3 mg) into the muscle as needed for anaphylaxis 2 each 1    fluticasone-salmeterol (ADVAIR) 500-50 MCG/ACT inhaler Inhale 1 puff into the lungs every 12 hours 60 each 1    fluticasone-salmeterol (AIRDUO RESPICLICK) 232-14 MCG/ACT inhaler Inhale 1 puff into the lungs 2 times daily 1 each 3    metFORMIN (GLUCOPHAGE XR) 500 MG 24 hr tablet TAKE ONE TABLET BY MOUTH ONE TIME DAILY WITH DINNER 30 tablet 0       Social History     Socioeconomic History    Marital status: Single     Spouse name: Not on file    Number of children: Not on file    Years of education: Not on file    Highest education level: Not on file   Occupational History    Not on file   Tobacco Use    Smoking status: Never    Smokeless tobacco: Never   Vaping Use    Vaping Use: Never used   Substance and Sexual Activity    Alcohol use: Not on file    Drug use: Not on file    Sexual activity: Yes     Partners: Male     Birth control/protection: OCP   Other Topics Concern    Not on file   Social History Narrative    Not on file     Social Determinants of Health     Financial Resource Strain: Low Risk  (2/1/2024)    Financial Resource Strain     Within the past 12 months, have you or your family members you live with been unable to get utilities (heat, electricity) when it was really needed?: No   Food Insecurity: Low Risk  (2/1/2024)    Food Insecurity     Within the past 12 months, did you worry that your food would run out before  you got money to buy more?: No     Within the past 12 months, did the food you bought just not last and you didn t have money to get more?: No   Transportation Needs: Low Risk  (2/1/2024)    Transportation Needs     Within the past 12 months, has lack of transportation kept you from medical appointments, getting your medicines, non-medical meetings or appointments, work, or from getting things that you need?: No   Physical Activity: Not on file   Stress: Not on file   Social Connections: Not on file   Interpersonal Safety: Low Risk  (2/7/2024)    Interpersonal Safety     Do you feel physically and emotionally safe where you currently live?: Yes     Within the past 12 months, have you been hit, slapped, kicked or otherwise physically hurt by someone?: No     Within the past 12 months, have you been humiliated or emotionally abused in other ways by your partner or ex-partner?: No   Housing Stability: Low Risk  (2/1/2024)    Housing Stability     Do you have housing? : Yes     Are you worried about losing your housing?: No       Family History   Problem Relation Age of Onset    Cardiomyopathy Father     Hypertension Maternal Grandmother     Hyperlipidemia Maternal Grandfather     Substance Abuse Paternal Grandmother     Cancer Paternal Grandmother     Hyperlipidemia Paternal Grandfather          Review of Systems:  10 point ROS of systems including Constitutional, Eyes, Respiratory, Cardiovascular, Gastroenterology, Genitourinary, Integumentary, Muscularskeletal, Psychiatric were all negative except for pertinent positives noted in my HPI and in the PMH.        EXAM:  /83 (BP Location: Right arm, Cuff Size: Adult Large)   Pulse 99   Wt 98.3 kg (216 lb 12.8 oz)   LMP 05/10/2023   SpO2 96%   BMI 35.53 kg/m    Body mass index is 35.53 kg/m .  General:  WNWD female, NAD  Alert  Oriented x 3  Gait:  Normal  Skin:  Normal skin turgor  HEENT:  NC/AT, EOMI  Neck:  No masses noted, symmetrical  Lungs:  Good  respiratory effort   Pelvic exam:  not performed.   Extremities:  No clubbing, cyanosis or edema.       ASSESSMENT:  Endometriosis  breakthrough bleeding on OCPs.   Acne       PLAN:  At this time, I suggest to continue with the Mayra as the OCP has lower androgenic effects than some other OCPs.  The prescription is written.   We also discussed the management of endometriosis with the OCPs and I feel this is the best option to continue with.  We reviewed the use of Lupron, but this is for 6 months and the side effects of a pseudo menopause.  I don't feel this would be much benefit since she has been on the OCPs for a number of years and the Lupron would be followed by the OCPs.    She will attempt to obtain a copy of the ultrasound films for further evaluation.    The breakthrough bleeding management is discussed with her.  We discussed cycling periodically, and she would rather stay on the continuous use of the OCP.     The prescription for Amyra is written.   She has not had a gyn exam since about 2022, from my review of the records.  She will schedule this.     Total time preparing to see patient with reviewing prior encounter and labs, face to face time,  and coordinating care on the same calendar date:  50 minutes.       Edwin Jackson MD

## 2024-03-13 ENCOUNTER — MYC REFILL (OUTPATIENT)
Dept: FAMILY MEDICINE | Facility: CLINIC | Age: 24
End: 2024-03-13
Payer: COMMERCIAL

## 2024-03-13 DIAGNOSIS — E66.01 CLASS 2 SEVERE OBESITY DUE TO EXCESS CALORIES WITH SERIOUS COMORBIDITY AND BODY MASS INDEX (BMI) OF 35.0 TO 35.9 IN ADULT (H): ICD-10-CM

## 2024-03-13 DIAGNOSIS — E66.812 CLASS 2 SEVERE OBESITY DUE TO EXCESS CALORIES WITH SERIOUS COMORBIDITY AND BODY MASS INDEX (BMI) OF 35.0 TO 35.9 IN ADULT (H): ICD-10-CM

## 2024-03-14 RX ORDER — METFORMIN HCL 500 MG
TABLET, EXTENDED RELEASE 24 HR ORAL
Qty: 90 TABLET | Refills: 0 | Status: SHIPPED | OUTPATIENT
Start: 2024-03-14 | End: 2024-05-22

## 2024-04-20 NOTE — TELEPHONE ENCOUNTER
DIAGNOSIS: right wrist pain with limited ROM and cannot close \ self\ medica\ ortho con     APPOINTMENT DATE: 4.22.24   NOTES STATUS DETAILS   MEDICATION LIST Internal

## 2024-04-22 ENCOUNTER — PRE VISIT (OUTPATIENT)
Dept: ORTHOPEDICS | Facility: CLINIC | Age: 24
End: 2024-04-22

## 2024-04-22 ENCOUNTER — ANCILLARY PROCEDURE (OUTPATIENT)
Dept: GENERAL RADIOLOGY | Facility: CLINIC | Age: 24
End: 2024-04-22
Attending: STUDENT IN AN ORGANIZED HEALTH CARE EDUCATION/TRAINING PROGRAM
Payer: COMMERCIAL

## 2024-04-22 ENCOUNTER — OFFICE VISIT (OUTPATIENT)
Dept: ORTHOPEDICS | Facility: CLINIC | Age: 24
End: 2024-04-22
Payer: COMMERCIAL

## 2024-04-22 DIAGNOSIS — M65.4 DE QUERVAIN'S TENOSYNOVITIS, RIGHT: Primary | ICD-10-CM

## 2024-04-22 DIAGNOSIS — M25.531 RIGHT WRIST PAIN: Primary | ICD-10-CM

## 2024-04-22 DIAGNOSIS — M67.431 GANGLION OF RIGHT WRIST: ICD-10-CM

## 2024-04-22 DIAGNOSIS — M25.531 RIGHT WRIST PAIN: ICD-10-CM

## 2024-04-22 PROCEDURE — 99203 OFFICE O/P NEW LOW 30 MIN: CPT | Performed by: STUDENT IN AN ORGANIZED HEALTH CARE EDUCATION/TRAINING PROGRAM

## 2024-04-22 PROCEDURE — 73110 X-RAY EXAM OF WRIST: CPT | Mod: RT | Performed by: RADIOLOGY

## 2024-04-22 NOTE — PROGRESS NOTES
"[facility]  Sports Medicine Clinic  Clinics and Surgery Center           SUBJECTIVE       Idalmis Swann is a 23 year old female presenting to clinic today with R wrist pain.    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   The bump reoccurred on Friday and she attempted to pop it again and when she did caused pain and swelling.   Pain has been stable and mainly over radial aspect of wrist, radiating to her thumb and index finger. At worst it was a 6/10  She has tried ice which relieved her pain for a short period of time but it reoccurred.   Describes as a burning pain.     PMH, Medications and Allergies were reviewed and updated as needed.    ROS:  As noted above otherwise negative.    Patient Active Problem List   Diagnosis    Other iron deficiency anemia    Mild intermittent asthma without complication    Endometriosis    Nut allergy    Intractable migraine with aura without status migrainosus    Mixed dyslipidemia    Thyromegaly    Seasonal allergic rhinitis    Reactive airway disease    COVID-19 long hauler manifesting chronic decreased mobility and endurance    Chronic post-COVID-19 syndrome    Chondromalacia patellae, left knee    Discogenic cervical pain    Hemorrhagic ovarian cyst    Other insomnia    Spondylolysis of lumbosacral region    Class 2 severe obesity due to excess calories with serious " comorbidity in adult (H)       Current Outpatient Medications   Medication Sig Dispense Refill    drospirenone-ethinyl estradiol (JAMIE) 3-0.02 MG tablet Take 1 tablet by mouth daily On day 25, discard the inactive pills and start new pack 112 tablet 3    drospirenone-ethinyl estradiol (JAMIE) 3-0.02 MG tablet       EPINEPHrine (ANY BX GENERIC EQUIV) 0.3 MG/0.3ML injection 2-pack Inject 0.3 mLs (0.3 mg) into the muscle as needed for anaphylaxis 2 each 1    fluticasone-salmeterol (ADVAIR) 500-50 MCG/ACT inhaler Inhale 1 puff into the lungs every 12 hours 60 each 1    fluticasone-salmeterol (AIRDUO RESPICLICK) 232-14 MCG/ACT inhaler Inhale 1 puff into the lungs 2 times daily 1 each 3    metFORMIN (GLUCOPHAGE XR) 500 MG 24 hr tablet TAKE ONE TABLET BY MOUTH ONE TIME DAILY WITH DINNER 90 tablet 0            OBJECTIVE:       Vitals: There were no vitals filed for this visit.  BMI: There is no height or weight on file to calculate BMI.    Gen:  Well nourished and in no acute distress  HEENT: Extraocular movement intact  Neck: Supple  Pulm:  Breathing Comfortably. No increased respiratory effort.  Psych: Euthymic. Appropriately answers questions    MSK: Radial side of R wrist with mildy increased swelling when compared to L. No evidence of a loculated lesion. No erytham, or warmth. Pain on palpation over radial aspect of right wrist, schapoid,lunate, and 1st and 2nd digits. Active and passive ROM of R wrist intact. R  strength, opponens pollicens and digit abduction, wrist flexion, extension, ulnar and radial deviation with 5/5 strength. Positive R- sided Finkelstein's maneuver.        Study Result    Narrative & Impression   Exam: 3 views of the right wrist dated 4/22/2024.     COMPARISON: None.     CLINICAL HISTORY: Wrist pain.     FINDINGS: AP, oblique, and lateral views of the right wrist were  obtained. The bones are well aligned. The joint spaces are  well-maintained. No fracture or dislocation.                                                                       IMPRESSION: No acute bone abnormality in the right wrist.             ASSESSMENT and PLAN:     Idalmis was seen today for pain.    Diagnoses and all orders for this visit:    De Quervain's tenosynovitis, right  -     Wrist/Arm/Hand Bracking Supplies Order Wrist Brace; Right; with thumb spica    Ganglion of right wrist        Idalmis Swann is a 23 year old female presenting to clinic today with R wrist pain and swelling concerning for R dorsal ganglion cyst rupture and de Quervain's Tenosynovitis.      R dorsal Ruptured Ganglion Cyst   R sided de Quervain's Tenosynovitis   No evidence of fracture on R wrist X-ray. Story consistent with R wrist ganglion cyst with a likely rupture. Positive Finkelstein's raising concern for de Quervain's Tenosynovitis.  - Rest, Ice, NSAIDs  - 2 weeks of R Thumb Spica brace   - Follow up in 2-4 weeks if symptoms persist   - Counseled patient extensively on s/s of cyst, as well as prognosis and recurrence of ganglion cyst  - if no improvement on follow-up, after conservative treatment, can consider advanced imaging vs aspiration if cyst recurs.     Options for treatment and/or follow-up care were reviewed with the patient was actively involved in the decision making process. Patient verbalized understanding and was in agreement with the plan.    Amadou Rowley, MS3  [employer]     I was present with the medical student who participated in the service and in the documentation of this note. I have verified the history and personally performed the physical exam and medical decision making, and have verified the content of the note, which accurately reflects my assessment of the patient and the plan of care.      Ruy Sewell DO  , Sports Medicine  Department of Family Medicine and [facility]

## 2024-04-22 NOTE — PROGRESS NOTES
DME FITTING    Relevant Diagnosis: right wrist De Quervains   Thumb spica brace was fit on patient's Right wrist.     Person(s) involved in teaching:   Patient    Brace was applied in standard Manner:  Yes  Brace fit well:  Yes  Patient reports brace to fit comfortably:  Yes    Education:   Patient shown self application and removal of brace: Yes  Patient shown how to adjust brace fit, if necessary: Yes  Patient educated on billing and return policy: Yes  Patient confirmed understanding when and how to contact clinic with concerns: Yes      Simeon Farias M.A., LAT, ATC  Certified Athletic Trainer

## 2024-04-22 NOTE — LETTER
"  4/22/2024      RE: Idalmis Swann  [address]  [place] [address]     Dear Colleague,    Thank you for referring your patient, Idalmis Swann, to the [facility] [place]. Please see a copy of my visit note below.    [facility]  Sports Medicine Clinic  Clinics and Surgery Center           SUBJECTIVE       Idalmis Swann is a 23 year old female presenting to clinic today with R wrist pain.    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   The bump reoccurred on Friday and she attempted to pop it again and when she did caused pain and swelling.   Pain has been stable and mainly over radial aspect of wrist, radiating to her thumb and index finger. At worst it was a 6/10  She has tried ice which relieved her pain for a short period of time but it reoccurred.   Describes as a burning pain.     PMH, Medications and Allergies were reviewed and updated as needed.    ROS:  As noted above otherwise negative.    Patient Active Problem List   Diagnosis     Other iron deficiency anemia     Mild intermittent asthma without complication     Endometriosis     Nut allergy     Intractable migraine with aura without status migrainosus     Mixed dyslipidemia     Thyromegaly     Seasonal allergic rhinitis     Reactive airway disease     COVID-19 long hauler manifesting " chronic decreased mobility and endurance     Chronic post-COVID-19 syndrome     Chondromalacia patellae, left knee     Discogenic cervical pain     Hemorrhagic ovarian cyst     Other insomnia     Spondylolysis of lumbosacral region     Class 2 severe obesity due to excess calories with serious comorbidity in adult (H)       Current Outpatient Medications   Medication Sig Dispense Refill     drospirenone-ethinyl estradiol (JAMIE) 3-0.02 MG tablet Take 1 tablet by mouth daily On day 25, discard the inactive pills and start new pack 112 tablet 3     drospirenone-ethinyl estradiol (JAMIE) 3-0.02 MG tablet        EPINEPHrine (ANY BX GENERIC EQUIV) 0.3 MG/0.3ML injection 2-pack Inject 0.3 mLs (0.3 mg) into the muscle as needed for anaphylaxis 2 each 1     fluticasone-salmeterol (ADVAIR) 500-50 MCG/ACT inhaler Inhale 1 puff into the lungs every 12 hours 60 each 1     fluticasone-salmeterol (AIRDUO RESPICLICK) 232-14 MCG/ACT inhaler Inhale 1 puff into the lungs 2 times daily 1 each 3     metFORMIN (GLUCOPHAGE XR) 500 MG 24 hr tablet TAKE ONE TABLET BY MOUTH ONE TIME DAILY WITH DINNER 90 tablet 0            OBJECTIVE:       Vitals: There were no vitals filed for this visit.  BMI: There is no height or weight on file to calculate BMI.    Gen:  Well nourished and in no acute distress  HEENT: Extraocular movement intact  Neck: Supple  Pulm:  Breathing Comfortably. No increased respiratory effort.  Psych: Euthymic. Appropriately answers questions    MSK: Radial side of R wrist with mildy increased swelling when compared to L. No evidence of a loculated lesion. No erytham, or warmth. Pain on palpation over radial aspect of right wrist, schapoid,lunate, and 1st and 2nd digits. Active and passive ROM of R wrist intact. R  strength, opponens pollicens and digit abduction, wrist flexion, extension, ulnar and radial deviation with 5/5 strength. Positive R- sided Finkelstein's maneuver.        Study Result    Narrative & Impression    Exam: 3 views of the right wrist dated 4/22/2024.     COMPARISON: None.     CLINICAL HISTORY: Wrist pain.     FINDINGS: AP, oblique, and lateral views of the right wrist were  obtained. The bones are well aligned. The joint spaces are  well-maintained. No fracture or dislocation.                                                                      IMPRESSION: No acute bone abnormality in the right wrist.             ASSESSMENT and PLAN:     Idalmis was seen today for pain.    Diagnoses and all orders for this visit:    De Quervain's tenosynovitis, right  -     Wrist/Arm/Hand Bracking Supplies Order Wrist Brace; Right; with thumb spica    Ganglion of right wrist        Idalmis Swann is a 23 year old female presenting to clinic today with R wrist pain and swelling concerning for R dorsal ganglion cyst rupture and de Quervain's Tenosynovitis.      R dorsal Ruptured Ganglion Cyst   R sided de Quervain's Tenosynovitis   No evidence of fracture on R wrist X-ray. Story consistent with R wrist ganglion cyst with a likely rupture. Positive Finkelstein's raising concern for de Quervain's Tenosynovitis.  - Rest, Ice, NSAIDs  - 2 weeks of R Thumb Spica brace   - Follow up in 2-4 weeks if symptoms persist   - Counseled patient extensively on s/s of cyst, as well as prognosis and recurrence of ganglion cyst  - if no improvement on follow-up, after conservative treatment, can consider advanced imaging vs aspiration if cyst recurs.     Options for treatment and/or follow-up care were reviewed with the patient was actively involved in the decision making process. Patient verbalized understanding and was in agreement with the plan.    Amadou Rowley, MS3  [employer]     I was present with the medical student who participated in the service and in the documentation of this note. I have verified the history and personally performed the physical exam and medical decision making, and have verified the  content of the note, which accurately reflects my assessment of the patient and the plan of care.      Ruy Sewell DO  , Sports Medicine  Department of Family [facility] and [facility]      DME FITTING    Relevant Diagnosis: right wrist De Quervains   Thumb spica brace was fit on patient's Right wrist.     Person(s) involved in teaching:   Patient    Brace was applied in standard Manner:  Yes  Brace fit well:  Yes  Patient reports brace to fit comfortably:  Yes    Education:   Patient shown self application and removal of brace: Yes  Patient shown how to adjust brace fit, if necessary: Yes  Patient educated on billing and return policy: Yes  Patient confirmed understanding when and how to contact clinic with concerns: Yes      Simeon Farias M.A., LAT, ATC  Certified Athletic Trainer        Again, thank you for allowing me to participate in the care of your patient.      Sincerely,    Ruy Sewell DO

## 2024-05-08 NOTE — PROGRESS NOTES
[facility]  Sports Medicine Clinic  Clinics and Surgery Center           SUBJECTIVE       Idalmis Swann is a 23 year old female presenting to clinic today for follow-up of the right wrist.  Patient has been wearing the thumb spica brace, without disruption.  No first dorsal compartment pain.  Still notices some pain, with intermittent swelling, and the dorsum of the wrist.  Pain in the area is worse with extension.  No instability, redness or warmth, fevers or chills, unexpected weight loss, or night sweats.    4/22/24: R dorsal Ruptured Ganglion Cyst   R sided de Quervain's Tenosynovitis   No evidence of fracture on R wrist X-ray. Story consistent with R wrist ganglion cyst with a likely rupture. Positive Finkelstein's raising concern for de Quervain's Tenosynovitis.  - Rest, Ice, NSAIDs  - 2 weeks of R Thumb Spica brace   - Follow up in 2-4 weeks if symptoms persist   - Counseled patient extensively on s/s of cyst, as well as prognosis and recurrence of ganglion cyst  - if no improvement on follow-up, after conservative treatment, can consider advanced imaging vs aspiration if cyst recurs.      Options for treatment and/or follow-up care were reviewed with the patient was actively involved in the decision making process. Patient verbalized understanding and was in agreement with the plan.     Amadou Rowley, MS3  [facility] [employer]      I was present with the medical student who participated in the service and in the documentation of this note. I have verified the history and personally performed the physical exam and medical decision making, and have verified the content of the note, which accurately reflects my assessment of the patient and the plan of care.    PMH, Medications and Allergies were reviewed and updated as needed.    ROS:  As noted above otherwise negative.    Patient Active Problem List   Diagnosis    Other iron deficiency anemia    Mild intermittent asthma  without complication    Endometriosis    Nut allergy    Intractable migraine with aura without status migrainosus    Mixed dyslipidemia    Thyromegaly    Seasonal allergic rhinitis    Reactive airway disease    COVID-19 long hauler manifesting chronic decreased mobility and endurance    Chronic post-COVID-19 syndrome    Chondromalacia patellae, left knee    Discogenic cervical pain    Hemorrhagic ovarian cyst    Other insomnia    Spondylolysis of lumbosacral region    Class 2 severe obesity due to excess calories with serious comorbidity in adult (H)       Current Outpatient Medications   Medication Sig Dispense Refill    drospirenone-ethinyl estradiol (JAMIE) 3-0.02 MG tablet Take 1 tablet by mouth daily On day 25, discard the inactive pills and start new pack 112 tablet 3    drospirenone-ethinyl estradiol (JAMIE) 3-0.02 MG tablet       EPINEPHrine (ANY BX GENERIC EQUIV) 0.3 MG/0.3ML injection 2-pack Inject 0.3 mLs (0.3 mg) into the muscle as needed for anaphylaxis 2 each 1    fluticasone-salmeterol (ADVAIR) 500-50 MCG/ACT inhaler Inhale 1 puff into the lungs every 12 hours 60 each 1    fluticasone-salmeterol (AIRDUO RESPICLICK) 232-14 MCG/ACT inhaler Inhale 1 puff into the lungs 2 times daily 1 each 3    metFORMIN (GLUCOPHAGE XR) 500 MG 24 hr tablet TAKE ONE TABLET BY MOUTH ONE TIME DAILY WITH DINNER 90 tablet 0            OBJECTIVE:       Vitals: There were no vitals filed for this visit.  BMI: There is no height or weight on file to calculate BMI.    Gen:  Well nourished and in no acute distress  HEENT: Extraocular movement intact  Neck: Supple  Pulm:  Breathing Comfortably. No increased respiratory effort.  Psych: Euthymic. Appropriately answers questions    MSK: Right wrist with full range of motion.  Accompanying strength in all directions is 5/5.  Very small, palpable nodule in the dorsum of the DRUJ, consistent with a ganglion cyst, improved from previous visit.  No first dorsal compartment pain.    strength, pincer , and wide finger abduction strength is 5/5.  Negative TFCC grind, and CMC grind testing.        Study Result    Narrative & Impression   Exam: 3 views of the right wrist dated 4/22/2024.     COMPARISON: None.     CLINICAL HISTORY: Wrist pain.     FINDINGS: AP, oblique, and lateral views of the right wrist were  obtained. The bones are well aligned. The joint spaces are  well-maintained. No fracture or dislocation.                                                                      IMPRESSION: No acute bone abnormality in the right wrist.                ASSESSMENT and PLAN:     Idalmis was seen today for right wrist/hand f/u .    Diagnoses and all orders for this visit:    De Quervain's tenosynovitis, right  -     Hand Therapy Referral; Future    Ganglion of right wrist  -     Hand Therapy Referral; Future      23-year-old female presenting to clinic today with much improved de Quervain's tenosynovitis of the right hand, and improved ganglion cyst of the right wrist.  I have discussed options for management with the patient including observation, aspiration, surgical removal, as well as hand therapy.  At this point the patient is not interested in an aspiration, or surgical removal.  At this point given the fact that it is resolving I do think this is beneficial.  I am fine with her starting hand therapy as she has requested it, although I do believe that her first dorsal compartment tenosynovitis is improving, and I do not think that a hand therapy structure of a ganglion cyst.  Wppointment will change the overall patient still wants to start this, order has been placed.  At this point she can monitor.,  If it is bothering her getting larger, she should return to clinic at which point we can schedule her for an ultrasound-guided aspiration of the cyst.  All questions answered.  Return precautions advised.    Options for treatment and/or follow-up care were reviewed with the patient was actively  involved in the decision making process. Patient verbalized understanding and was in agreement with the plan.    Ruy Sewell DO  , Sports Medicine  Department of Family Medicine and [facility]

## 2024-05-13 ENCOUNTER — OFFICE VISIT (OUTPATIENT)
Dept: ORTHOPEDICS | Facility: CLINIC | Age: 24
End: 2024-05-13
Payer: COMMERCIAL

## 2024-05-13 DIAGNOSIS — M65.4 DE QUERVAIN'S TENOSYNOVITIS, RIGHT: Primary | ICD-10-CM

## 2024-05-13 DIAGNOSIS — M67.431 GANGLION OF RIGHT WRIST: ICD-10-CM

## 2024-05-13 PROCEDURE — 99213 OFFICE O/P EST LOW 20 MIN: CPT | Performed by: STUDENT IN AN ORGANIZED HEALTH CARE EDUCATION/TRAINING PROGRAM

## 2024-05-13 NOTE — LETTER
5/13/2024      RE: Idalmis Swann  [address]  [place] [facility] [place] [address]     Dear Colleague,    Thank you for referring your patient, Idalmis Swann, to the [facility] [place]. Please see a copy of my visit note below.    [facility]  Sports Medicine Clinic  Clinics and Surgery Center           SUBJECTIVE       Idalmis Swann is a 23 year old female presenting to clinic today for follow-up of the right wrist.  Patient has been wearing the thumb spica brace, without disruption.  No first dorsal compartment pain.  Still notices some pain, with intermittent swelling, and the dorsum of the wrist.  Pain in the area is worse with extension.  No instability, redness or warmth, fevers or chills, unexpected weight loss, or night sweats.    4/22/24: R dorsal Ruptured Ganglion Cyst   R sided de Quervain's Tenosynovitis   No evidence of fracture on R wrist X-ray. Story consistent with R wrist ganglion cyst with a likely rupture. Positive Finkelstein's raising concern for de Quervain's Tenosynovitis.  - Rest, Ice, NSAIDs  - 2 weeks of R Thumb Spica brace   - Follow up in 2-4 weeks if symptoms persist   - Counseled patient extensively on s/s of cyst, as well as prognosis and recurrence of ganglion cyst  - if no improvement on follow-up, after conservative treatment, can consider advanced imaging vs aspiration if cyst recurs.      Options for treatment and/or follow-up care were reviewed with the patient was actively involved in the decision making process. Patient verbalized understanding and was in agreement with the plan.     Amadou Rowley, MS3  [facility] [employer]      I was present with the medical student who participated in the service and in the documentation of this note. I have verified the history and personally performed the physical exam and medical decision making, and have verified the content of the note, which accurately  reflects my assessment of the patient and the plan of care.    PMH, Medications and Allergies were reviewed and updated as needed.    ROS:  As noted above otherwise negative.    Patient Active Problem List   Diagnosis     Other iron deficiency anemia     Mild intermittent asthma without complication     Endometriosis     Nut allergy     Intractable migraine with aura without status migrainosus     Mixed dyslipidemia     Thyromegaly     Seasonal allergic rhinitis     Reactive airway disease     COVID-19 long hauler manifesting chronic decreased mobility and endurance     Chronic post-COVID-19 syndrome     Chondromalacia patellae, left knee     Discogenic cervical pain     Hemorrhagic ovarian cyst     Other insomnia     Spondylolysis of lumbosacral region     Class 2 severe obesity due to excess calories with serious comorbidity in adult (H)       Current Outpatient Medications   Medication Sig Dispense Refill     drospirenone-ethinyl estradiol (JAMIE) 3-0.02 MG tablet Take 1 tablet by mouth daily On day 25, discard the inactive pills and start new pack 112 tablet 3     drospirenone-ethinyl estradiol (JAMIE) 3-0.02 MG tablet        EPINEPHrine (ANY BX GENERIC EQUIV) 0.3 MG/0.3ML injection 2-pack Inject 0.3 mLs (0.3 mg) into the muscle as needed for anaphylaxis 2 each 1     fluticasone-salmeterol (ADVAIR) 500-50 MCG/ACT inhaler Inhale 1 puff into the lungs every 12 hours 60 each 1     fluticasone-salmeterol (AIRDUO RESPICLICK) 232-14 MCG/ACT inhaler Inhale 1 puff into the lungs 2 times daily 1 each 3     metFORMIN (GLUCOPHAGE XR) 500 MG 24 hr tablet TAKE ONE TABLET BY MOUTH ONE TIME DAILY WITH DINNER 90 tablet 0            OBJECTIVE:       Vitals: There were no vitals filed for this visit.  BMI: There is no height or weight on file to calculate BMI.    Gen:  Well nourished and in no acute distress  HEENT: Extraocular movement intact  Neck: Supple  Pulm:  Breathing Comfortably. No increased respiratory effort.  Psych:  Euthymic. Appropriately answers questions    MSK: Right wrist with full range of motion.  Accompanying strength in all directions is 5/5.  Very small, palpable nodule in the dorsum of the DRUJ, consistent with a ganglion cyst, improved from previous visit.  No first dorsal compartment pain.   strength, pincer , and wide finger abduction strength is 5/5.  Negative TFCC grind, and CMC grind testing.        Study Result    Narrative & Impression   Exam: 3 views of the right wrist dated 4/22/2024.     COMPARISON: None.     CLINICAL HISTORY: Wrist pain.     FINDINGS: AP, oblique, and lateral views of the right wrist were  obtained. The bones are well aligned. The joint spaces are  well-maintained. No fracture or dislocation.                                                                      IMPRESSION: No acute bone abnormality in the right wrist.                ASSESSMENT and PLAN:     Idalmis was seen today for right wrist/hand f/u .    Diagnoses and all orders for this visit:    De Quervain's tenosynovitis, right  -     Hand Therapy Referral; Future    Ganglion of right wrist  -     Hand Therapy Referral; Future      23-year-old female presenting to clinic today with much improved de Quervain's tenosynovitis of the right hand, and improved ganglion cyst of the right wrist.  I have discussed options for management with the patient including observation, aspiration, surgical removal, as well as hand therapy.  At this point the patient is not interested in an aspiration, or surgical removal.  At this point given the fact that it is resolving I do think this is beneficial.  I am fine with her starting hand therapy as she has requested it, although I do believe that her first dorsal compartment tenosynovitis is improving, and I do not think that a hand therapy structure of a ganglion cyst.  Wppointment will change the overall patient still wants to start this, order has been placed.  At this point she can  monitor.,  If it is bothering her getting larger, she should return to clinic at which point we can schedule her for an ultrasound-guided aspiration of the cyst.  All questions answered.  Return precautions advised.    Options for treatment and/or follow-up care were reviewed with the patient was actively involved in the decision making process. Patient verbalized understanding and was in agreement with the plan.    Ruy Sewell DO  , Sports Medicine  Department of Family Medicine and [facility]

## 2024-05-15 ENCOUNTER — THERAPY VISIT (OUTPATIENT)
Dept: OCCUPATIONAL THERAPY | Facility: CLINIC | Age: 24
End: 2024-05-15
Payer: COMMERCIAL

## 2024-05-15 DIAGNOSIS — M67.431 GANGLION OF RIGHT WRIST: ICD-10-CM

## 2024-05-15 DIAGNOSIS — M65.4 DE QUERVAIN'S TENOSYNOVITIS, RIGHT: ICD-10-CM

## 2024-05-15 PROCEDURE — 97535 SELF CARE MNGMENT TRAINING: CPT | Mod: GO

## 2024-05-15 PROCEDURE — 97110 THERAPEUTIC EXERCISES: CPT | Mod: GO

## 2024-05-15 PROCEDURE — 97165 OT EVAL LOW COMPLEX 30 MIN: CPT | Mod: GO

## 2024-05-15 PROCEDURE — 97035 APP MDLTY 1+ULTRASOUND EA 15: CPT | Mod: GO

## 2024-05-15 NOTE — PROGRESS NOTES
OCCUPATIONAL THERAPY EVALUATION  Type of Visit: Evaluation    Subjective      Presenting condition or subjective complaint:   R wrist pain - hx injury 2022 -DeQuervain's and ganglion cyst   Date of onset: 05/13/24 (MD order date)  difficulties since 2022   Relevant medical history:     Past Medical History:   Diagnosis Date    Anemia, iron deficiency     Endometriosis 08/2021    documented by photo/visualization.  no biopsy performed.     Dates & types of surgery:    Past Surgical History:   Procedure Laterality Date    LAPAROSCOPY DIAGNOSTIC (GYN)  08/2021    Surgery to remove endometriosis unable to remove all. 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   Hobbies/Interests:   power lifting - difficult to do because amount of pressure to wrist even w/ straps     Patient goals for therapy:  decrease pain     Pain assessment: Pain present     Objective     Upper Extremity Functional Index Score:  SCORE:   Column Totals: /80: 56   (A lower score indicates greater disability.)    Right hand dominant  Patient reports symptoms of pain, weakness/loss of strength, edema, and tingling     Pain Level (Scale 0-10):   5/15/2024   At Rest 0-1   With Use 4-5     Pain Description:  Date 5/15/2024   Location wrist   Pain Quality Aching and Sharp   Frequency constant     Pain is worst  daytime   Exacerbated by  Gripping, weight bearing   Relieved by cold and bracing   Progression improving     Sensation   WNL throughout all nerve distributions; per patient report    Edema   - none  + mild    ++ moderate    +++ severe    5/15/2024   DWC L - 16.4cm; R- 17.1cm          ROM  Pain Report: - none  + mild    ++ moderate    +++ severe   Thumb 5/15/2024  5/15/2024   AROM (PROM) L R   MP /45 /45   IP /80 /80   RABD     PABD     Opposition 10 10+ tight     Wrist 5/15/2024 5/15/2024   AROM (PROM) L R   Extension 70 65+ tight   Flexion     RD  + pain ulnar wrist   UD     UD with Th Flex       Resisted Testing  Pain Report: - none  + mild    ++ moderate    +++ severe    5/15/2024   APL -   EPB -   EPL -   FCR -   Wrist ext 4+/5 +   Wrist flex 5/5   Wrist radial dev 5/5   Wrist ulnar dev  5/5     Strength   (Measured in pounds)  Pain Report: - none  + mild    ++ moderate    +++ severe    5/15/2024 5/15/2024   Trials L R   1  2  3 55 50   Average       Lat Pinch 5/15/2024 5/15/2024   Trials L R   1  2  3 16 15+   Average       3 Pt Pinch 5/15/2024 5/15/2024   Trials L R   1  2  3 12 11   Average       Special Tests   Pain Report:  - none    + mild    ++ moderate    +++ severe    5/15/2024   Finkelsteins ++   Radial Nerve Tinel's (DRSN) -   Ap Joint Laxity of Trapezium    Crepitus Present    CMC Adduction Stress Test    CMC Extension Stress Test    WHAT test +       Neural Tension Testing  RNT: Radial Neurodynamic Test (based on DS Daryl's ULNT)   5/15/2024   0-5 Scale 5/5   Position:   0/5: Arm across abdomen in coronal plane  1/5: Depress shoulder, ER to neutral ABD shoulder to 45 degrees  2/5: IR shoulder to end range, keep elbow at 90 degrees  3/5: Extend elbow to 0 degrees  4/5: Fully pronate forearm  5/5: Flex wrist and fingers with UD  Notes:  (+) indicates beyond grade level but less than FPC to next level  (-) indicates over FPC to level  S1  onset/change of patient's symptoms  S2 definite stop point based on patient's discomfort level    Palpation R UE   Pain Report:  - none    + mild    ++ moderate    +++ severe    5/15/2024   4th dorsal compartment +   1st DC -   FCR -   Thumb CMC -   APL/EPB muscle belly +   Extensor Wad -         Assessment & Plan   CLINICAL IMPRESSIONS  Medical Diagnosis: R DeQuervain's tenosynovitis, R ganglion cyst    Treatment  Diagnosis: R wrist pain    Impression/Assessment: Pt is a 23 year old female presenting to Occupational Therapy due to R wrist pain.  The following significant findings have been identified: Impaired activity tolerance, Impaired ROM, Impaired strength, and Pain.  These identified deficits interfere with their ability to perform self care tasks, work tasks, recreational activities, household chores, and meal planning and preparation as compared to previous level of function.   Patient's limitations or Problem List includes: Pain, Decreased ROM/motion, Increased edema, and Decreased  of the right wrist and thumb which interferes with the patient's ability to perform Self Care Tasks (bathing), Work Tasks, Recreational Activities, and Household Chores as compared to previous level of function.    Clinical Decision Making (Complexity):  Assessment of Occupational Performance: 3-5 Performance Deficits  Occupational Performance Limitations: bathing/showering, health management and maintenance, home establishment and management, meal preparation and cleanup, shopping, work, and leisure activities  Clinical Decision Making (Complexity): Low complexity    PLAN OF CARE  Treatment Interventions:  Modalities:  US and TENS  Therapeutic Exercise:  AROM, PROM, Tendon Gliding, Isometrics, and Eccentrics  Neuromuscular re-education:  Proprioceptive Training and Kinesiotaping  Manual Techniques:  Joint mobilization, Friction massage, Myofascial release, and Manual edema mobilization  Orthotic Fabrication:  Static  Self Care:  Self Care Tasks, Ergonomic Considerations, and Work Tasks    Long Term Goals   OT Goal 1  Goal Description: Pt will report 1/10 or less R wrist pain during resistive gripping IADLs for improved QOL and occupational function by 7/10/24.  Rationale: In order to maximize safety and independence with ADL/IADLs  Target Date: 07/10/24      Frequency of Treatment: 1x/week  Duration of Treatment: 8 weeks      Recommended Referrals to Other Professionals:  None  Education Assessment: Learner/Method: Patient  Education Comments: No barriers to learning     Risks and benefits of evaluation/treatment have been explained.   Patient/Family/caregiver agrees with Plan of Care.     Evaluation Time:    OT Eval, Low Complexity Minutes (61001): 15      Signing Clinician: EMEKA Lemos

## 2024-05-22 ENCOUNTER — ANCILLARY PROCEDURE (OUTPATIENT)
Dept: ULTRASOUND IMAGING | Facility: CLINIC | Age: 24
End: 2024-05-22
Attending: NURSE PRACTITIONER
Payer: COMMERCIAL

## 2024-05-22 ENCOUNTER — OFFICE VISIT (OUTPATIENT)
Dept: PEDIATRICS | Facility: CLINIC | Age: 24
End: 2024-05-22
Payer: COMMERCIAL

## 2024-05-22 ENCOUNTER — OFFICE VISIT (OUTPATIENT)
Dept: FAMILY MEDICINE | Facility: CLINIC | Age: 24
End: 2024-05-22
Payer: COMMERCIAL

## 2024-05-22 ENCOUNTER — ANCILLARY PROCEDURE (OUTPATIENT)
Dept: CT IMAGING | Facility: CLINIC | Age: 24
End: 2024-05-22
Attending: NURSE PRACTITIONER
Payer: COMMERCIAL

## 2024-05-22 VITALS
BODY MASS INDEX: 35.45 KG/M2 | DIASTOLIC BLOOD PRESSURE: 80 MMHG | WEIGHT: 220.6 LBS | RESPIRATION RATE: 16 BRPM | TEMPERATURE: 98.8 F | SYSTOLIC BLOOD PRESSURE: 140 MMHG | OXYGEN SATURATION: 99 % | HEART RATE: 89 BPM | HEIGHT: 66 IN

## 2024-05-22 VITALS
DIASTOLIC BLOOD PRESSURE: 81 MMHG | RESPIRATION RATE: 20 BRPM | SYSTOLIC BLOOD PRESSURE: 126 MMHG | HEART RATE: 95 BPM | BODY MASS INDEX: 35.36 KG/M2 | TEMPERATURE: 98.8 F | OXYGEN SATURATION: 98 % | WEIGHT: 220 LBS | HEIGHT: 66 IN

## 2024-05-22 DIAGNOSIS — R06.02 SOB (SHORTNESS OF BREATH): Primary | ICD-10-CM

## 2024-05-22 DIAGNOSIS — E66.01 CLASS 2 SEVERE OBESITY DUE TO EXCESS CALORIES WITH SERIOUS COMORBIDITY AND BODY MASS INDEX (BMI) OF 36.0 TO 36.9 IN ADULT (H): ICD-10-CM

## 2024-05-22 DIAGNOSIS — E66.01 CLASS 2 SEVERE OBESITY DUE TO EXCESS CALORIES WITH SERIOUS COMORBIDITY AND BODY MASS INDEX (BMI) OF 35.0 TO 35.9 IN ADULT (H): ICD-10-CM

## 2024-05-22 DIAGNOSIS — D72.829 LEUKOCYTOSIS, UNSPECIFIED TYPE: ICD-10-CM

## 2024-05-22 DIAGNOSIS — R79.89 ELEVATED D-DIMER: ICD-10-CM

## 2024-05-22 DIAGNOSIS — E01.0 THYROMEGALY: ICD-10-CM

## 2024-05-22 DIAGNOSIS — R60.0 LOWER LEG EDEMA: ICD-10-CM

## 2024-05-22 DIAGNOSIS — E66.812 CLASS 2 SEVERE OBESITY DUE TO EXCESS CALORIES WITH SERIOUS COMORBIDITY AND BODY MASS INDEX (BMI) OF 35.0 TO 35.9 IN ADULT (H): ICD-10-CM

## 2024-05-22 DIAGNOSIS — M79.89 SWELLING OF BOTH LOWER EXTREMITIES: Primary | ICD-10-CM

## 2024-05-22 DIAGNOSIS — E66.812 CLASS 2 SEVERE OBESITY DUE TO EXCESS CALORIES WITH SERIOUS COMORBIDITY AND BODY MASS INDEX (BMI) OF 36.0 TO 36.9 IN ADULT (H): ICD-10-CM

## 2024-05-22 DIAGNOSIS — D50.8 OTHER IRON DEFICIENCY ANEMIA: ICD-10-CM

## 2024-05-22 DIAGNOSIS — R06.02 SOB (SHORTNESS OF BREATH): ICD-10-CM

## 2024-05-22 DIAGNOSIS — R06.83 SNORES: ICD-10-CM

## 2024-05-22 LAB
ALBUMIN SERPL BCG-MCNC: 4.3 G/DL (ref 3.5–5.2)
ALBUMIN UR-MCNC: NEGATIVE MG/DL
ALP SERPL-CCNC: 85 U/L (ref 40–150)
ALT SERPL W P-5'-P-CCNC: 13 U/L (ref 0–50)
ANION GAP SERPL CALCULATED.3IONS-SCNC: 11 MMOL/L (ref 7–15)
APPEARANCE UR: CLEAR
AST SERPL W P-5'-P-CCNC: 20 U/L (ref 0–45)
BACTERIA #/AREA URNS HPF: ABNORMAL /HPF
BASOPHILS # BLD AUTO: 0.1 10E3/UL (ref 0–0.2)
BASOPHILS NFR BLD AUTO: 0 %
BILIRUB SERPL-MCNC: 0.2 MG/DL
BILIRUB UR QL STRIP: NEGATIVE
BUN SERPL-MCNC: 9.1 MG/DL (ref 6–20)
CALCIUM SERPL-MCNC: 9.7 MG/DL (ref 8.6–10)
CHLORIDE SERPL-SCNC: 103 MMOL/L (ref 98–107)
COLOR UR AUTO: COLORLESS
CREAT SERPL-MCNC: 0.65 MG/DL (ref 0.51–0.95)
CRP SERPL-MCNC: 14.2 MG/L
D DIMER PPP FEU-MCNC: 0.59 UG/ML FEU (ref 0–0.5)
DEPRECATED HCO3 PLAS-SCNC: 23 MMOL/L (ref 22–29)
EGFRCR SERPLBLD CKD-EPI 2021: >90 ML/MIN/1.73M2
EOSINOPHIL # BLD AUTO: 0.2 10E3/UL (ref 0–0.7)
EOSINOPHIL NFR BLD AUTO: 1 %
ERYTHROCYTE [DISTWIDTH] IN BLOOD BY AUTOMATED COUNT: 13.2 % (ref 10–15)
FERRITIN SERPL-MCNC: 50 NG/ML (ref 6–175)
GLUCOSE SERPL-MCNC: 80 MG/DL (ref 70–99)
GLUCOSE UR STRIP-MCNC: NEGATIVE MG/DL
HBA1C MFR BLD: 5.2 % (ref 0–5.6)
HCT VFR BLD AUTO: 40.1 % (ref 35–47)
HGB BLD-MCNC: 13.5 G/DL (ref 11.7–15.7)
HGB UR QL STRIP: NEGATIVE
IMM GRANULOCYTES # BLD: 0.1 10E3/UL
IMM GRANULOCYTES NFR BLD: 0 %
IRON BINDING CAPACITY (ROCHE): 431 UG/DL (ref 240–430)
IRON SATN MFR SERPL: 17 % (ref 15–46)
IRON SERPL-MCNC: 73 UG/DL (ref 37–145)
KETONES UR STRIP-MCNC: NEGATIVE MG/DL
LEUKOCYTE ESTERASE UR QL STRIP: ABNORMAL
LYMPHOCYTES # BLD AUTO: 4 10E3/UL (ref 0.8–5.3)
LYMPHOCYTES NFR BLD AUTO: 29 %
MAGNESIUM SERPL-MCNC: 2.1 MG/DL (ref 1.7–2.3)
MCH RBC QN AUTO: 28.1 PG (ref 26.5–33)
MCHC RBC AUTO-ENTMCNC: 33.7 G/DL (ref 31.5–36.5)
MCV RBC AUTO: 84 FL (ref 78–100)
MONOCYTES # BLD AUTO: 0.8 10E3/UL (ref 0–1.3)
MONOCYTES NFR BLD AUTO: 6 %
NEUTROPHILS # BLD AUTO: 8.6 10E3/UL (ref 1.6–8.3)
NEUTROPHILS NFR BLD AUTO: 63 %
NITRATE UR QL: NEGATIVE
NRBC # BLD AUTO: 0 10E3/UL
NRBC BLD AUTO-RTO: 0 /100
PH UR STRIP: 6.5 [PH] (ref 5–7)
PLATELET # BLD AUTO: 409 10E3/UL (ref 150–450)
POTASSIUM SERPL-SCNC: 4.2 MMOL/L (ref 3.4–5.3)
PROT SERPL-MCNC: 7.6 G/DL (ref 6.4–8.3)
RBC # BLD AUTO: 4.8 10E6/UL (ref 3.8–5.2)
RBC #/AREA URNS AUTO: ABNORMAL /HPF
RETICS # AUTO: 0.11 10E6/UL
RETICS/RBC NFR AUTO: 2.4 %
SODIUM SERPL-SCNC: 137 MMOL/L (ref 135–145)
SP GR UR STRIP: 1.01 (ref 1–1.03)
SQUAMOUS #/AREA URNS AUTO: ABNORMAL /LPF
TSH SERPL DL<=0.005 MIU/L-ACNC: 2.23 UIU/ML (ref 0.3–4.2)
UROBILINOGEN UR STRIP-MCNC: NORMAL MG/DL
WBC # BLD AUTO: 13.7 10E3/UL (ref 4–11)
WBC #/AREA URNS AUTO: ABNORMAL /HPF

## 2024-05-22 PROCEDURE — 93970 EXTREMITY STUDY: CPT | Performed by: RADIOLOGY

## 2024-05-22 PROCEDURE — 36415 COLL VENOUS BLD VENIPUNCTURE: CPT | Performed by: NURSE PRACTITIONER

## 2024-05-22 PROCEDURE — 83735 ASSAY OF MAGNESIUM: CPT | Performed by: NURSE PRACTITIONER

## 2024-05-22 PROCEDURE — 86140 C-REACTIVE PROTEIN: CPT | Performed by: NURSE PRACTITIONER

## 2024-05-22 PROCEDURE — 99207 REFERRAL TO ACUTE AND DIAGNOSTIC SERVICES: CPT

## 2024-05-22 PROCEDURE — 99000 SPECIMEN HANDLING OFFICE-LAB: CPT | Performed by: NURSE PRACTITIONER

## 2024-05-22 PROCEDURE — 83550 IRON BINDING TEST: CPT | Performed by: NURSE PRACTITIONER

## 2024-05-22 PROCEDURE — 71275 CT ANGIOGRAPHY CHEST: CPT | Mod: GC | Performed by: RADIOLOGY

## 2024-05-22 PROCEDURE — 83540 ASSAY OF IRON: CPT | Performed by: NURSE PRACTITIONER

## 2024-05-22 PROCEDURE — 82728 ASSAY OF FERRITIN: CPT | Performed by: NURSE PRACTITIONER

## 2024-05-22 PROCEDURE — 83036 HEMOGLOBIN GLYCOSYLATED A1C: CPT | Performed by: NURSE PRACTITIONER

## 2024-05-22 PROCEDURE — 84481 FREE ASSAY (FT-3): CPT | Performed by: NURSE PRACTITIONER

## 2024-05-22 PROCEDURE — 82607 VITAMIN B-12: CPT | Performed by: NURSE PRACTITIONER

## 2024-05-22 PROCEDURE — 99207 BLOOD MORPHOLOGY PATHOLOGIST REVIEW: CPT | Performed by: PATHOLOGY

## 2024-05-22 PROCEDURE — 82306 VITAMIN D 25 HYDROXY: CPT | Performed by: NURSE PRACTITIONER

## 2024-05-22 PROCEDURE — 85379 FIBRIN DEGRADATION QUANT: CPT | Performed by: NURSE PRACTITIONER

## 2024-05-22 PROCEDURE — 80053 COMPREHEN METABOLIC PANEL: CPT | Performed by: NURSE PRACTITIONER

## 2024-05-22 PROCEDURE — 76536 US EXAM OF HEAD AND NECK: CPT | Performed by: RADIOLOGY

## 2024-05-22 PROCEDURE — 85025 COMPLETE CBC W/AUTO DIFF WBC: CPT | Performed by: NURSE PRACTITIONER

## 2024-05-22 PROCEDURE — 99215 OFFICE O/P EST HI 40 MIN: CPT | Performed by: NURSE PRACTITIONER

## 2024-05-22 PROCEDURE — 84479 ASSAY OF THYROID (T3 OR T4): CPT | Mod: 90 | Performed by: NURSE PRACTITIONER

## 2024-05-22 PROCEDURE — 85045 AUTOMATED RETICULOCYTE COUNT: CPT | Performed by: NURSE PRACTITIONER

## 2024-05-22 PROCEDURE — 84443 ASSAY THYROID STIM HORMONE: CPT | Performed by: NURSE PRACTITIONER

## 2024-05-22 PROCEDURE — 81001 URINALYSIS AUTO W/SCOPE: CPT | Performed by: NURSE PRACTITIONER

## 2024-05-22 RX ORDER — IOPAMIDOL 755 MG/ML
85 INJECTION, SOLUTION INTRAVASCULAR ONCE
Status: COMPLETED | OUTPATIENT
Start: 2024-05-22 | End: 2024-05-22

## 2024-05-22 RX ADMIN — IOPAMIDOL 85 ML: 755 INJECTION, SOLUTION INTRAVASCULAR at 14:38

## 2024-05-22 ASSESSMENT — ASTHMA QUESTIONNAIRES
EMERGENCY_ROOM_LAST_YEAR_TOTAL: TWO
QUESTION_5 LAST FOUR WEEKS HOW WOULD YOU RATE YOUR ASTHMA CONTROL: COMPLETELY CONTROLLED
QUESTION_2 LAST FOUR WEEKS HOW OFTEN HAVE YOU HAD SHORTNESS OF BREATH: ONCE OR TWICE A WEEK
QUESTION_1 LAST FOUR WEEKS HOW MUCH OF THE TIME DID YOUR ASTHMA KEEP YOU FROM GETTING AS MUCH DONE AT WORK, SCHOOL OR AT HOME: NONE OF THE TIME
ACT_TOTALSCORE: 24
QUESTION_3 LAST FOUR WEEKS HOW OFTEN DID YOUR ASTHMA SYMPTOMS (WHEEZING, COUGHING, SHORTNESS OF BREATH, CHEST TIGHTNESS OR PAIN) WAKE YOU UP AT NIGHT OR EARLIER THAN USUAL IN THE MORNING: NOT AT ALL
ACT_TOTALSCORE: 24
QUESTION_4 LAST FOUR WEEKS HOW OFTEN HAVE YOU USED YOUR RESCUE INHALER OR NEBULIZER MEDICATION (SUCH AS ALBUTEROL): NOT AT ALL

## 2024-05-22 ASSESSMENT — PAIN SCALES - GENERAL: PAINLEVEL: MODERATE PAIN (5)

## 2024-05-22 NOTE — PROGRESS NOTES
Acute and Diagnostic Services Clinic Visit    Assessment & Plan   Problem List Items Addressed This Visit       Other iron deficiency anemia    Relevant Medications    sodium chloride (PF) 0.9% PF flush 3 mL    Other Relevant Orders    Iron and iron binding capacity (Completed)    Ferritin (Completed)    Vitamin B12    Thyromegaly    Relevant Medications    sodium chloride (PF) 0.9% PF flush 3 mL    Other Relevant Orders    US Thyroid (Completed)    TSH with free T4 reflex (Completed)    T Uptake    T3, Free    T3, total    Adult Endocrinology  Referral    Class 2 severe obesity due to excess calories with serious comorbidity in adult (H)    Relevant Medications    sodium chloride (PF) 0.9% PF flush 3 mL    Other Relevant Orders    A1c (Hemoglobin A1c) (Completed)    UA with Microscopic reflex to Culture (Completed)    Vitamin D Deficiency    Magnesium (Completed)    Adult Endocrinology  Referral    UA Microscopic with Reflex to Culture (Completed)     Other Visit Diagnoses       SOB (shortness of breath)    -  Primary    Relevant Medications    sodium chloride (PF) 0.9% PF flush 3 mL    Other Relevant Orders    D dimer quantitative (Completed)    Comprehensive metabolic panel (Completed)    CBC with platelets differential (Completed)    CT Chest Pulmonary Embolism w Contrast (Completed)    Lower leg edema        Relevant Medications    sodium chloride (PF) 0.9% PF flush 3 mL    Other Relevant Orders    US Lower Extremity Venous Duplex Bilateral (Completed)    Snores        Relevant Medications    sodium chloride (PF) 0.9% PF flush 3 mL    Leukocytosis, unspecified type        Relevant Orders    CRP, inflammation (Completed)    Lab Blood Morphology Pathologist Review    Elevated d-dimer        Relevant Orders    CT Chest Pulmonary Embolism w Contrast (Completed)         23-year-old female patient presents today to [facility] chief concern for new onset increase shortness of breath bilateral lower  extremity edema she is currently on oral contraceptive and flies most frequently.  She denies any chest pain chest pressure or heart palpitations no fever or chills.  Upon further discussion with patient she has had unintentional weight gain has a history of thyroid megaly was due for a thyroid ultrasound 1-1/2 years ago she is also noted to have leukocytosis, iron deficiency anemia, history of endometriosis with possible PCOS with recommendation for follow-up with gynecology patient indicates she has a follow-up in June 2024.    Diagnostics today include thyroid ultrasound which was negative, lower extremity ultrasound negative for DVT and patient noted to have mildly elevated D-dimer with negative CT PE protocol for pulmonary embolism.  The results of patient's lab and imaging were reviewed with her with recommendation to follow-up with her hematologist for leukocytosis with negative bone marrow biopsy 2022.  Record also indicated a positive hepatitis A, elevated CRP is also noted.  For her unintentional weight gain, thyroid megaly would recommend endocrinology follow-up additional lab work is pending.  For patient's endometriosis with possible PCOS recommend that she keep her follow-up with gynecology in June 2024.  Discussed with patient her unintentional weight gain with recommendation to keep a food diary she has various food allergies and is exercising on a regular basis and indicates she does not go out to eat she does not consume alcohol.  Patient will use her my fitness pal yessy to track her food prior to being seen by nutrition.      Will recommend that patient wear compression stockings when traveling.    Patient advised if symptoms persist, worsen or new symptoms arise they are to seek medical care.  All patients questions addressed. Patient verbalized understanding and agreement with plan.       50  minutes were spent doing chart review, history and exam, documentation and further activities per the  "note.         No follow-ups on file.      Judy Raygoza is a 23 year old, presenting for the following health issues:  Deep Vein Thrombosis (Bilateral Leg Swelling after flight home, Currently on Birth Control, Family hx of clots,)        5/22/2024    10:38 AM   Additional Questions   Roomed by Sheela STANLEY     Evaluation for possible DVT  Onset/Duration: Sunday when flying back from [place],  Description:       Location: Bilateral legs, Left is worse        Redness: no        Pain: 6/10       Warmth: YES       Joint swelling YES- Left Ankle   Progression of symptoms worse  Accompanying signs and symptoms:       Fevers: no        Numbness/tingling/weakness: YES       Chest pain/pleurisy: no        Shortness of breath: YES  History        Trauma: no         Recent travel/when: YES- [place] 5/18-5/19        Previous history of DVT: no         Family history of DVT: YES, Grandpa, Father        Recent surgery: no   Aggravating factors include: sitting - legs start to go numb, For walking and stairs pt feels like she's a little slower   Therapies tried and outcome: {: None   Prior surgery on arteries of veins in this area: No          Review of Systems  Constitutional, HEENT, cardiovascular, pulmonary, GI, , musculoskeletal, neuro, skin, endocrine and psych systems are negative, except as otherwise noted.      Objective    /81 (BP Location: Left arm, Patient Position: Sitting, Cuff Size: Adult Large)   Pulse 95   Temp 98.8  F (37.1  C) (Oral)   Resp 20   Ht 1.664 m (5' 5.5\")   Wt 99.8 kg (220 lb)   SpO2 98%   BMI 36.05 kg/m    Body mass index is 36.05 kg/m .  Physical Exam   GENERAL: alert and no distress  EYES: Eyes grossly normal to inspection,conjunctivae and sclerae normal  RESP: lungs clear to auscultation - no rales, rhonchi or wheezes  CV: regular rate and rhythm, normal S1 S2  MS: Bilateral lower extremity edema nonpitting pulses intact  PSYCH: mentation appears normal, affect " normal/bright    Results for orders placed or performed in visit on 05/22/24   US Lower Extremity Venous Duplex Bilateral     Status: None    Narrative    EXAMINATION: US LOWER EXTREMITY VENOUS DUPLEX BILATERAL, 5/22/2024  2:28 PM     COMPARISON: None.    HISTORY:  Lower leg edema    TECHNIQUE:  Gray-scale evaluation with compression, spectral flow and  color Doppler assessment of the deep venous system of both legs from  groin to knee, and then at the ankles.    FINDINGS:  In the both lower extremities, the common femoral, superficial  femoral, popliteal and posterior tibial veins demonstrate normal  compressibility and blood flow.      Impression    IMPRESSION:  1.  No evidence of deep venous thrombosis in either lower extremity.    ROMÁN GILBERT MD         SYSTEM ID:  [id number]   Results for orders placed or performed in visit on 05/22/24   US Thyroid     Status: None    Narrative    EXAMINATION: US THYROID, 5/22/2024 2:28 PM     COMPARISON: None.    HISTORY:  Thyromegaly    Technique: Grayscale and color ultrasound imaging of the thyroid was  performed.    Findings:    Thyroid parenchyma: homogenous  The right lobe of the thyroid measures: 4.8 x 1.4 x 1.6 cm   The thyroid isthmus measures: 5 x 1 x 1.5 cm   The left lobe of the thyroid measures: 0.2 cm       Impression    Impression:  Homogeneous and symmetric thyroid glands without thyroid nodule.    ROMÁN GILBERT MD         SYSTEM ID:  [id number]   Results for orders placed or performed in visit on 05/22/24   CT Chest Pulmonary Embolism w Contrast     Status: None    Narrative    EXAMINATION: CTA pulmonary angiogram, 5/22/2024 2:39 PM     COMPARISON: Chest radiograph 1/17/2024    HISTORY: Shortness of breath; elevated d-dimer    TECHNIQUE: Volumetric helical acquisition of CT images of the chest  from the lung apices to the kidneys were acquired after the  administration of 80 mL of Isovue-370 IV contrast. Flash technique  with free breathing acquisition.   Post-processed multiplanar and/or  MIP reformations were obtained, archived to PACS and used in  interpretation of this study.     FINDINGS:      Pulmonary arteries: Contrast bolus is: non optimal due to poor  contrast opacification of the subsegmental pulmonary arteries to  contrast bolus timing. Exam is negative for acute pulmonary embolism  in the pulmonary arterial trunk, right and left main pulmonary  arteries and their lobar pulmonary artery, bilateral intralobar  pulmonary arteries, and proximal pulmonary arterial segments. No  evidence of right heart strain.  No reflux of contrast within the IVC  or hepatic veins. Of note, this is a left MD contrast injection and  there is poor visualization of a short segment of the left subclavian  vein behind the medial head of the left clavicle which may indicate  short segment subtotal occlusion. There are few collateral vessels  noted.    Heart/Mediastinum: Heart is within normal limits. No thoracic  lymphadenopathy. Esophagus and thyroid are within normal limits.    Lungs/pleura: Mild decrease in attenuation of the lung parenchyma,  which may be due to inspiration. No pleural based wedge like  opacities. No pneumothorax, pleural effusion suspicious pulmonary  nodules or consolidations.    Chest wall soft tissues/axilla: No bulky lymphadenopathy. Chest wall  soft tissues are within normal limits.    Upper Abdomen: No acute abnormalities. Partially visualized upper  abdominal organs are within normal limits.    Bones: No acute or aggressive appearing osseous abnormalities.         Impression    IMPRESSION:   Exam is negative for acute pulmonary embolism in the pulmonary  arterial trunk, right and left main pulmonary arteries, bilateral  intralobar pulmonary arteries, and proximal pulmonary arterial  segments. Limited evaluation of the subsegmental pulmonary arteries  due to poor contrast opacification secondary to nonoptimal contrast  bolus timing. Short segment severe  left subclavian vein stenosis  medially.        In the event of a positive result for acute pulmonary embolism  resulting in right heart strain, please activate the [facility]  Multidisciplinary group for consultation by paging [phone]  [phone]).     PERT -- Pulmonary Embolism Response Team (Multidisciplinary team  including cardiology, interventional radiology, critical care,  hematology)    I have personally reviewed the examination and initial interpretation  and I agree with the findings.    TRENA CHONG MD         SYSTEM ID:  [id number]   Results for orders placed or performed in visit on 05/22/24   D dimer quantitative     Status: Abnormal   Result Value Ref Range    D-Dimer Quantitative 0.59 (H) 0.00 - 0.50 ug/mL FEU    Narrative    This D-dimer assay is intended for use in conjunction with a clinical pretest probability assessment model to exclude pulmonary embolism (PE) and deep venous thrombosis (DVT) in outpatients suspected of PE or DVT. The cut-off value is 0.50 ug/mL FEU.   Comprehensive metabolic panel     Status: Normal   Result Value Ref Range    Sodium 137 135 - 145 mmol/L    Potassium 4.2 3.4 - 5.3 mmol/L    Carbon Dioxide (CO2) 23 22 - 29 mmol/L    Anion Gap 11 7 - 15 mmol/L    Urea Nitrogen 9.1 6.0 - 20.0 mg/dL    Creatinine 0.65 0.51 - 0.95 mg/dL    GFR Estimate >90 >60 mL/min/1.73m2    Calcium 9.7 8.6 - 10.0 mg/dL    Chloride 103 98 - 107 mmol/L    Glucose 80 70 - 99 mg/dL    Alkaline Phosphatase 85 40 - 150 U/L    AST 20 0 - 45 U/L    ALT 13 0 - 50 U/L    Protein Total 7.6 6.4 - 8.3 g/dL    Albumin 4.3 3.5 - 5.2 g/dL    Bilirubin Total 0.2 <=1.2 mg/dL   A1c (Hemoglobin A1c)     Status: Normal   Result Value Ref Range    Hemoglobin A1C 5.2 0.0 - 5.6 %   UA with Microscopic reflex to Culture     Status: Abnormal    Specimen: Urine, Clean Catch   Result Value Ref Range    Color Urine Colorless Colorless, Straw, Light Yellow, Yellow    Appearance Urine Clear Clear    Glucose Urine Negative  Negative mg/dL    Bilirubin Urine Negative Negative    Ketones Urine Negative Negative mg/dL    Specific Gravity Urine 1.008 0.999 - 1.035    Blood Urine Negative Negative    pH Urine 6.5 5.0 - 7.0    Protein Albumin Urine Negative Negative mg/dL    Nitrite Urine Negative Negative    Leukocyte Esterase Urine Trace (A) Negative    Urobilinogen Urine Normal Normal, 2.0 mg/dL   Iron and iron binding capacity     Status: Abnormal   Result Value Ref Range    Iron 73 37 - 145 ug/dL    Iron Binding Capacity 431 (H) 240 - 430 ug/dL    Iron Sat Index 17 15 - 46 %   Ferritin     Status: Normal   Result Value Ref Range    Ferritin 50 6 - 175 ng/mL   Magnesium     Status: Normal   Result Value Ref Range    Magnesium 2.1 1.7 - 2.3 mg/dL   TSH with free T4 reflex     Status: Normal   Result Value Ref Range    TSH 2.23 0.30 - 4.20 uIU/mL   CBC with platelets and differential     Status: Abnormal   Result Value Ref Range    WBC Count 13.7 (H) 4.0 - 11.0 10e3/uL    RBC Count 4.80 3.80 - 5.20 10e6/uL    Hemoglobin 13.5 11.7 - 15.7 g/dL    Hematocrit 40.1 35.0 - 47.0 %    MCV 84 78 - 100 fL    MCH 28.1 26.5 - 33.0 pg    MCHC 33.7 31.5 - 36.5 g/dL    RDW 13.2 10.0 - 15.0 %    Platelet Count 409 150 - 450 10e3/uL    % Neutrophils 63 %    % Lymphocytes 29 %    % Monocytes 6 %    % Eosinophils 1 %    % Basophils 0 %    % Immature Granulocytes 0 %    NRBCs per 100 WBC 0 <1 /100    Absolute Neutrophils 8.6 (H) 1.6 - 8.3 10e3/uL    Absolute Lymphocytes 4.0 0.8 - 5.3 10e3/uL    Absolute Monocytes 0.8 0.0 - 1.3 10e3/uL    Absolute Eosinophils 0.2 0.0 - 0.7 10e3/uL    Absolute Basophils 0.1 0.0 - 0.2 10e3/uL    Absolute Immature Granulocytes 0.1 <=0.4 10e3/uL    Absolute NRBCs 0.0 10e3/uL   Reticulocyte count     Status: None   Result Value Ref Range    % Reticulocyte 2.4 %    Absolute Reticulocyte 0.112 10e6/uL   CRP, inflammation     Status: Abnormal   Result Value Ref Range    CRP Inflammation 14.20 (H) <5.00 mg/L   UA Microscopic with  Reflex to Culture     Status: Abnormal   Result Value Ref Range    Bacteria Urine None Seen None Seen /HPF    RBC Urine 0-2 0-2 /HPF /HPF    WBC Urine 0-5 0-5 /HPF /HPF    Squamous Epithelials Urine Few (A) None Seen /LPF    Narrative    Urine Culture not indicated   CBC with platelets differential     Status: Abnormal    Narrative    The following orders were created for panel order CBC with platelets differential.  Procedure                               Abnormality         Status                     ---------                               -----------         ------                     CBC with platelets and d...[[id number]]  Abnormal            Final result                 Please view results for these tests on the individual orders.   Lab Blood Morphology Pathologist Review     Status: None (In process)    Narrative    The following orders were created for panel order Lab Blood Morphology Pathologist Review.  Procedure                               Abnormality         Status                     ---------                               -----------         ------                     Bld morphology pathology...[[id number]]                      In process                 CBC with platelets and d...[[id number]]                                                 Reticulocyte count[[id number]]                               Final result               Morphology Tracking[[id number]]                              Final result                 Please view results for these tests on the individual orders.     Results for orders placed or performed in visit on 05/22/24 (from the past 24 hour(s))   D dimer quantitative   Result Value Ref Range    D-Dimer Quantitative 0.59 (H) 0.00 - 0.50 ug/mL FEU    Narrative    This D-dimer assay is intended for use in conjunction with a clinical pretest probability assessment model to exclude pulmonary embolism (PE) and deep venous thrombosis (DVT) in outpatients suspected of PE or DVT. The cut-off  value is 0.50 ug/mL FEU.   Comprehensive metabolic panel   Result Value Ref Range    Sodium 137 135 - 145 mmol/L    Potassium 4.2 3.4 - 5.3 mmol/L    Carbon Dioxide (CO2) 23 22 - 29 mmol/L    Anion Gap 11 7 - 15 mmol/L    Urea Nitrogen 9.1 6.0 - 20.0 mg/dL    Creatinine 0.65 0.51 - 0.95 mg/dL    GFR Estimate >90 >60 mL/min/1.73m2    Calcium 9.7 8.6 - 10.0 mg/dL    Chloride 103 98 - 107 mmol/L    Glucose 80 70 - 99 mg/dL    Alkaline Phosphatase 85 40 - 150 U/L    AST 20 0 - 45 U/L    ALT 13 0 - 50 U/L    Protein Total 7.6 6.4 - 8.3 g/dL    Albumin 4.3 3.5 - 5.2 g/dL    Bilirubin Total 0.2 <=1.2 mg/dL   CBC with platelets differential    Narrative    The following orders were created for panel order CBC with platelets differential.  Procedure                               Abnormality         Status                     ---------                               -----------         ------                     CBC with platelets and d...[[id number]]  Abnormal            Final result                 Please view results for these tests on the individual orders.   A1c (Hemoglobin A1c)   Result Value Ref Range    Hemoglobin A1C 5.2 0.0 - 5.6 %   Iron and iron binding capacity   Result Value Ref Range    Iron 73 37 - 145 ug/dL    Iron Binding Capacity 431 (H) 240 - 430 ug/dL    Iron Sat Index 17 15 - 46 %   Ferritin   Result Value Ref Range    Ferritin 50 6 - 175 ng/mL   Magnesium   Result Value Ref Range    Magnesium 2.1 1.7 - 2.3 mg/dL   TSH with free T4 reflex   Result Value Ref Range    TSH 2.23 0.30 - 4.20 uIU/mL   CBC with platelets and differential   Result Value Ref Range    WBC Count 13.7 (H) 4.0 - 11.0 10e3/uL    RBC Count 4.80 3.80 - 5.20 10e6/uL    Hemoglobin 13.5 11.7 - 15.7 g/dL    Hematocrit 40.1 35.0 - 47.0 %    MCV 84 78 - 100 fL    MCH 28.1 26.5 - 33.0 pg    MCHC 33.7 31.5 - 36.5 g/dL    RDW 13.2 10.0 - 15.0 %    Platelet Count 409 150 - 450 10e3/uL    % Neutrophils 63 %    % Lymphocytes 29 %    % Monocytes 6 %     % Eosinophils 1 %    % Basophils 0 %    % Immature Granulocytes 0 %    NRBCs per 100 WBC 0 <1 /100    Absolute Neutrophils 8.6 (H) 1.6 - 8.3 10e3/uL    Absolute Lymphocytes 4.0 0.8 - 5.3 10e3/uL    Absolute Monocytes 0.8 0.0 - 1.3 10e3/uL    Absolute Eosinophils 0.2 0.0 - 0.7 10e3/uL    Absolute Basophils 0.1 0.0 - 0.2 10e3/uL    Absolute Immature Granulocytes 0.1 <=0.4 10e3/uL    Absolute NRBCs 0.0 10e3/uL   Lab Blood Morphology Pathologist Review    Narrative    The following orders were created for panel order Lab Blood Morphology Pathologist Review.  Procedure                               Abnormality         Status                     ---------                               -----------         ------                     Bld morphology pathology...[[id number]]                      In process                 CBC with platelets and d...[[id number]]                                                 Reticulocyte count[[id number]]                               Final result               Morphology Tracking[[id number]]                              Final result                 Please view results for these tests on the individual orders.   Reticulocyte count   Result Value Ref Range    % Reticulocyte 2.4 %    Absolute Reticulocyte 0.112 10e6/uL   CRP, inflammation   Result Value Ref Range    CRP Inflammation 14.20 (H) <5.00 mg/L   UA with Microscopic reflex to Culture    Specimen: Urine, Clean Catch   Result Value Ref Range    Color Urine Colorless Colorless, Straw, Light Yellow, Yellow    Appearance Urine Clear Clear    Glucose Urine Negative Negative mg/dL    Bilirubin Urine Negative Negative    Ketones Urine Negative Negative mg/dL    Specific Gravity Urine 1.008 0.999 - 1.035    Blood Urine Negative Negative    pH Urine 6.5 5.0 - 7.0    Protein Albumin Urine Negative Negative mg/dL    Nitrite Urine Negative Negative    Leukocyte Esterase Urine Trace (A) Negative    Urobilinogen Urine Normal Normal, 2.0 mg/dL   UA  Microscopic with Reflex to Culture   Result Value Ref Range    Bacteria Urine None Seen None Seen /HPF    RBC Urine 0-2 0-2 /HPF /HPF    WBC Urine 0-5 0-5 /HPF /HPF    Squamous Epithelials Urine Few (A) None Seen /LPF    Narrative    Urine Culture not indicated           Wt Readings from Last 5 Encounters:   05/22/24 99.8 kg (220 lb)   05/22/24 100.1 kg (220 lb 9.6 oz)   03/06/24 98.3 kg (216 lb 12.8 oz)   02/07/24 99.5 kg (219 lb 6.4 oz)     .lip  Signed Electronically by: Monica Gaytan NP

## 2024-05-22 NOTE — PROGRESS NOTES
"  Assessment & Plan     Swelling of both lower extremities  Bilateral lower extremity swelling starting after flying over the weekend ([place] to [facility]). She does report mild pain to calves and is on birth control and reports a family history of blood clots. Although less likely, patient was sent to [facility] to rule out DVT.     Class 2 severe obesity due to excess calories with serious comorbidity and body mass index (BMI) of 35.0 to 35.9 in adult (H)  Patient was started on metformin in February. She reports no improvement to weight and that she believes she has just gained more water weight since starting it. Plan for her to discontinue metformin and follow up in two weeks to discuss other options for weight management.       Judy Raygoza is a 23 year old, presenting for the following health issues:  Swelling      5/22/2024    10:38 AM   Additional Questions   Roomed by Sheela     History of Present Illness       Reason for visit:  Swelling of feet and legs, retaining water from medication    She eats 4 or more servings of fruits and vegetables daily.She consumes 1 sweetened beverage(s) daily.She exercises with enough effort to increase her heart rate 30 to 60 minutes per day.  She exercises with enough effort to increase her heart rate 6 days per week.   She is taking medications regularly.     Started Metformin in February noticed increased swelling around the time that she started medication. On Sunday noticed the swelling worsened after her flight home from [place]. She also reports mild shortness of breath within the last couple of days. She does have a history of asthma. Patient is taking SORAYA and reports a family history of blood clots.         Objective    BP (!) 140/84 (BP Location: Right arm, Patient Position: Sitting, Cuff Size: Adult Large)   Pulse 89   Temp 98.8  F (37.1  C) (Temporal)   Resp 16   Ht 1.664 m (5' 5.5\")   Wt 100.1 kg (220 lb 9.6 oz)   SpO2 99%   BMI 36.15 kg/m    Body mass " index is 36.15 kg/m .  Physical Exam   GENERAL: alert and no distress  RESP: lungs clear to auscultation - no rales, rhonchi or wheezes  CV: regular rates and rhythm, normal S1 S2, no S3 or S4, no murmur, click or rub, peripheral pulses strong, and mild swelling to lower extremities bilaterally, nonpitting.   SKIN: no suspicious lesions or rashes  PSYCH: mentation appears normal, affect normal/bright            Referral to Acute and Diagnostic Services    [phone] ([facility]) [place] [id number] [address] [place] [id number]    Transition to Acute & Diagnostic Services Clinic has been discussed with patient, and she agrees with next level of care.   Patient understands that evaluation/treatment at [facility] typically takes significantly longer than in clinic/urgent care (>2 hours).  The [facility] Acute and Diagnostics Services Clinic has been contacted by provider/staff to confirm patient acceptance.         Special issues:      None                                 Signed Electronically by: RAJESH Brewster CNP

## 2024-05-23 LAB
PATH REPORT.COMMENTS IMP SPEC: NORMAL
PATH REPORT.FINAL DX SPEC: NORMAL
PATH REPORT.MICROSCOPIC SPEC OTHER STN: NORMAL
PATH REPORT.MICROSCOPIC SPEC OTHER STN: NORMAL
PATH REPORT.RELEVANT HX SPEC: NORMAL
T3 SERPL-MCNC: 193 NG/DL (ref 85–202)
T3FREE SERPL-MCNC: 3.3 PG/ML (ref 2–4.4)
VIT B12 SERPL-MCNC: 372 PG/ML (ref 232–1245)
VIT D+METAB SERPL-MCNC: 44 NG/ML (ref 20–50)

## 2024-05-24 LAB — TOTAL TBC SERPL: 1.4 TBI

## 2024-05-29 ENCOUNTER — THERAPY VISIT (OUTPATIENT)
Dept: OCCUPATIONAL THERAPY | Facility: CLINIC | Age: 24
End: 2024-05-29
Payer: COMMERCIAL

## 2024-05-29 DIAGNOSIS — M65.4 DE QUERVAIN'S TENOSYNOVITIS, RIGHT: Primary | ICD-10-CM

## 2024-05-29 PROCEDURE — 97110 THERAPEUTIC EXERCISES: CPT | Mod: GO

## 2024-05-29 PROCEDURE — 97035 APP MDLTY 1+ULTRASOUND EA 15: CPT | Mod: GO

## 2024-06-05 ENCOUNTER — THERAPY VISIT (OUTPATIENT)
Dept: OCCUPATIONAL THERAPY | Facility: CLINIC | Age: 24
End: 2024-06-05
Payer: COMMERCIAL

## 2024-06-05 ENCOUNTER — VIRTUAL VISIT (OUTPATIENT)
Dept: FAMILY MEDICINE | Facility: CLINIC | Age: 24
End: 2024-06-05
Payer: COMMERCIAL

## 2024-06-05 DIAGNOSIS — M65.4 DE QUERVAIN'S TENOSYNOVITIS, RIGHT: Primary | ICD-10-CM

## 2024-06-05 DIAGNOSIS — E66.01 CLASS 2 SEVERE OBESITY DUE TO EXCESS CALORIES WITH SERIOUS COMORBIDITY AND BODY MASS INDEX (BMI) OF 36.0 TO 36.9 IN ADULT (H): Primary | ICD-10-CM

## 2024-06-05 DIAGNOSIS — M79.89 LOCALIZED SWELLING OF LOWER EXTREMITY: ICD-10-CM

## 2024-06-05 DIAGNOSIS — E66.812 CLASS 2 SEVERE OBESITY DUE TO EXCESS CALORIES WITH SERIOUS COMORBIDITY AND BODY MASS INDEX (BMI) OF 36.0 TO 36.9 IN ADULT (H): Primary | ICD-10-CM

## 2024-06-05 DIAGNOSIS — Z91.018 NUT ALLERGY: ICD-10-CM

## 2024-06-05 PROCEDURE — 97110 THERAPEUTIC EXERCISES: CPT | Mod: GO

## 2024-06-05 PROCEDURE — 99213 OFFICE O/P EST LOW 20 MIN: CPT | Mod: 95

## 2024-06-05 PROCEDURE — 97035 APP MDLTY 1+ULTRASOUND EA 15: CPT | Mod: GO

## 2024-06-05 RX ORDER — ALBUTEROL SULFATE 0.83 MG/ML
SOLUTION RESPIRATORY (INHALATION)
COMMUNITY
Start: 2023-12-29

## 2024-06-05 RX ORDER — EPINEPHRINE 0.3 MG/.3ML
0.3 INJECTION SUBCUTANEOUS PRN
Qty: 2 EACH | Refills: 1 | Status: SHIPPED | OUTPATIENT
Start: 2024-06-05

## 2024-06-05 NOTE — PROGRESS NOTES
Idalmis is a 23 year old who is being evaluated via a billable video visit.    How would you like to obtain your AVS? MyChart  If the video visit is dropped, the invitation should be resent by: Text to cell phone: [phone]  Will anyone else be joining your video visit? No      Assessment & Plan     Class 2 severe obesity due to excess calories with serious comorbidity and body mass index (BMI) of 36.0 to 36.9 in adult (H)  Has stopped Metformin. Recent established care with an endocrinologist outside the facility and is completing testing. Patient reports the ability to lactate and endocrinology believes there may be something underlying that could be causing weight gain. Plan for patient to follow with endocrine.     Localized swelling of lower extremity  Has improved since stopping metformin, during previous visit, patient had risk factors which put her at an increased risk of developing a DVT. Because of this, she was referred to [facility] and imaging completing and negative for DVT. Plan for patient to continue to monitor swelling.     Nut allergy  Epi pen refilled.   - EPINEPHrine (ANY BX GENERIC EQUIV) 0.3 MG/0.3ML injection 2-pack; Inject 0.3 mLs (0.3 mg) into the muscle as needed for anaphylaxis      Subjective   Idalmis is a 23 year old, presenting for the following health issues:  Follow Up (Bilateral leg swelling- improving) and endocrinology      6/5/2024     7:56 AM   Additional Questions   Roomed by korey lepe     History of Present Illness       Reason for visit:  Swelling of feet and legs, retaining water from medication    She eats 4 or more servings of fruits and vegetables daily.She consumes 1 sweetened beverage(s) daily.She exercises with enough effort to increase her heart rate 30 to 60 minutes per day.  She exercises with enough effort to increase her heart rate 6 days per week.   She is taking medications regularly.           Pt reports leg swelling has improved.  No other symptoms related to leg  swelling  Pt has a referral for endocrinology.  Has stopped metformin two weeks ago and reports leg swelling has improved. She did not notice much change to weight while on medication.     Recently saw endocrinology at outside facility and ordered prolactin levels and cortisol levels as she reports spontaneous lactation and purple stretch marks. Results have not come back yet. Plans to continue to work with endocrinology on weight gain.         Objective           Vitals:  No vitals were obtained today due to virtual visit.    Physical Exam   GENERAL: alert and no distress  RESP: No audible wheeze, cough, or visible cyanosis.    SKIN: Visible skin clear. No significant rash, abnormal pigmentation or lesions.  NEURO: Cranial nerves grossly intact.  Mentation and speech appropriate for age.  PSYCH: Appropriate affect, tone, and pace of words          Video-Visit Details    Type of service:  Video Visit   Originating Location (pt. Location): Home    Distant Location (provider location):  On-site  Platform used for Video Visit: Michelle  Signed Electronically by: RAJESH Brewster CNP

## 2024-06-10 ENCOUNTER — MYC MEDICAL ADVICE (OUTPATIENT)
Dept: FAMILY MEDICINE | Facility: CLINIC | Age: 24
End: 2024-06-10
Payer: COMMERCIAL

## 2024-06-14 ENCOUNTER — THERAPY VISIT (OUTPATIENT)
Dept: OCCUPATIONAL THERAPY | Facility: CLINIC | Age: 24
End: 2024-06-14
Payer: COMMERCIAL

## 2024-06-14 DIAGNOSIS — M65.4 DE QUERVAIN'S TENOSYNOVITIS, RIGHT: Primary | ICD-10-CM

## 2024-06-14 PROCEDURE — 97035 APP MDLTY 1+ULTRASOUND EA 15: CPT | Mod: GO

## 2024-06-14 PROCEDURE — 97140 MANUAL THERAPY 1/> REGIONS: CPT | Mod: GO

## 2024-07-03 ENCOUNTER — MEDICAL CORRESPONDENCE (OUTPATIENT)
Dept: HEALTH INFORMATION MANAGEMENT | Facility: CLINIC | Age: 24
End: 2024-07-03
Payer: COMMERCIAL

## 2024-07-09 ENCOUNTER — OFFICE VISIT (OUTPATIENT)
Dept: FAMILY MEDICINE | Facility: CLINIC | Age: 24
End: 2024-07-09
Payer: COMMERCIAL

## 2024-07-09 ENCOUNTER — TRANSCRIBE ORDERS (OUTPATIENT)
Dept: OTHER | Age: 24
End: 2024-07-09

## 2024-07-09 VITALS
DIASTOLIC BLOOD PRESSURE: 80 MMHG | RESPIRATION RATE: 18 BRPM | SYSTOLIC BLOOD PRESSURE: 128 MMHG | OXYGEN SATURATION: 99 % | HEART RATE: 101 BPM | TEMPERATURE: 97.9 F

## 2024-07-09 DIAGNOSIS — J01.11 ACUTE RECURRENT FRONTAL SINUSITIS: Primary | ICD-10-CM

## 2024-07-09 DIAGNOSIS — N60.49: Primary | ICD-10-CM

## 2024-07-09 DIAGNOSIS — H65.03 NON-RECURRENT ACUTE SEROUS OTITIS MEDIA OF BOTH EARS: ICD-10-CM

## 2024-07-09 PROCEDURE — 99213 OFFICE O/P EST LOW 20 MIN: CPT

## 2024-07-09 RX ORDER — ALBUTEROL SULFATE 90 UG/1
2 AEROSOL, METERED RESPIRATORY (INHALATION) EVERY 6 HOURS PRN
Qty: 18 G | Refills: 0 | Status: SHIPPED | OUTPATIENT
Start: 2024-07-09

## 2024-07-09 RX ORDER — DOXYCYCLINE 100 MG/1
100 CAPSULE ORAL 2 TIMES DAILY
Qty: 20 CAPSULE | Refills: 0 | Status: SHIPPED | OUTPATIENT
Start: 2024-07-09 | End: 2024-07-19

## 2024-07-09 ASSESSMENT — ENCOUNTER SYMPTOMS
SWOLLEN GLANDS: 1
COUGH: 1
CHILLS: 1
SORE THROAT: 1
FEVER: 1
FATIGUE: 1

## 2024-07-09 NOTE — PROGRESS NOTES
Assessment & Plan       ICD-10-CM    1. Acute recurrent frontal sinusitis  J01.11 doxycycline hyclate (VIBRAMYCIN) 100 MG capsule     albuterol (PROAIR HFA/PROVENTIL HFA/VENTOLIN HFA) 108 (90 Base) MCG/ACT inhaler      2. Non-recurrent acute serous otitis media of both ears  H65.03 doxycycline hyclate (VIBRAMYCIN) 100 MG capsule     albuterol (PROAIR HFA/PROVENTIL HFA/VENTOLIN HFA) 108 (90 Base) MCG/ACT inhaler           Drink plenty of fluids, rest, warm compresses on face. You may try Mucinex twice daily for at least 4 days to thin congestion. I recommend Sandra Pot or José Miguel Med 1x in the morning 1x at night with distilled water or boiled then cooled water (Saline mist spray is acceptable but not as effective). Benadryl (diphenhydramine) at bedtime then either Claritin (Loratadine), Allegra (Fexofenadine), or Zyrtec (Cetirizine) in the day. Flonase (Fluticasone) 2x each nostril twice a day for two weeks, then once each nostril once a day. Take antibiotic as directed. Take daily probiotic (ex. Culturelle) and yogurt (ex. Activia or greek yogurt) while on antibiotic and continue for 10 days after completion of antibiotic.      Follow up with primary care provider with any problems, questions or concerns or if symptoms worsen or fail to improve. Patient agreed to plan and verbalized understanding.     Judy Raygoza is a 24 year old female who presents to clinic today for the following health issues:  Chief Complaint   Patient presents with    Urgent Care    URI     Ear pain and having sinus pressure. Pt went swimming last week and since hasn't been able to hear much mainly her left ear. TX- dayquil, nyquil, theraflu, tylenol, and muxinex     URI  This is a new problem. The current episode started 1 to 4 weeks ago. The problem occurs constantly. The problem has been gradually worsening. Associated symptoms include chills, congestion, coughing, fatigue, a fever, a sore throat and swollen glands. She has tried  NSAIDs, acetaminophen and lying down for the symptoms. The treatment provided mild relief.           Review of Systems   Constitutional:  Positive for chills, fatigue and fever.   HENT:  Positive for congestion and sore throat.    Respiratory:  Positive for cough.        Problem List:  2024-05: De Quervain's tenosynovitis, right  2024-05: Ganglion of right wrist  2024-02: Other iron deficiency anemia  2024-02: Mild intermittent asthma without complication  2024-02: Endometriosis  2024-02: Nut allergy  2024-02: Class 2 severe obesity due to excess calories with serious   comorbidity in adult (H)  2022-12: COVID-19 long hauler manifesting chronic decreased mobility   and endurance  2022-09: Reactive airway disease  2022-09: Chronic post-COVID-19 syndrome  2022-05: Seasonal allergic rhinitis  2021-11: Thyromegaly  2021-11: Other insomnia  2021-03: Hemorrhagic ovarian cyst  2020-11: Mixed dyslipidemia  2020-10: Intractable migraine with aura without status migrainosus  2020-10: Chondromalacia patellae, left knee  2020-10: Discogenic cervical pain  2019-02: Spondylolysis of lumbosacral region      Past Medical History:   Diagnosis Date    Anemia, iron deficiency     Endometriosis 08/2021    documented by photo/visualization.  no biopsy performed.       Social History     Tobacco Use    Smoking status: Never    Smokeless tobacco: Never   Substance Use Topics    Alcohol use: Not on file           Objective    /80   Pulse 101   Temp 97.9  F (36.6  C) (Tympanic)   Resp 18   SpO2 99%   Physical Exam  Constitutional:       Appearance: Normal appearance.   HENT:      Head: Normocephalic and atraumatic.      Right Ear: Tympanic membrane, ear canal and external ear normal.      Left Ear: Tympanic membrane, ear canal and external ear normal.      Nose: Congestion present.      Right Turbinates: Swollen.      Left Turbinates: Swollen.      Right Sinus: Frontal sinus tenderness present.      Left Sinus: Frontal sinus  tenderness present.      Mouth/Throat:      Pharynx: Posterior oropharyngeal erythema present. No oropharyngeal exudate.   Eyes:      Conjunctiva/sclera: Conjunctivae normal.      Pupils: Pupils are equal, round, and reactive to light.   Cardiovascular:      Rate and Rhythm: Normal rate and regular rhythm.      Heart sounds: Normal heart sounds.   Pulmonary:      Effort: Pulmonary effort is normal.      Breath sounds: Normal breath sounds.   Musculoskeletal:      Cervical back: Normal range of motion and neck supple.   Lymphadenopathy:      Cervical: Cervical adenopathy present.   Skin:     General: Skin is warm and dry.   Neurological:      General: No focal deficit present.      Mental Status: She is alert and oriented to person, place, and time.   Psychiatric:         Mood and Affect: Mood normal.         Thought Content: Thought content normal.              Angus Sigala PA-C

## 2024-07-09 NOTE — LETTER
July 9, 2024      Idalmis Swann  [address]  [address]  [facility] [address]        To Whom It May Concern:    Idalmis Swann  was seen on 7/9/24.  Please excuse her  until 7/12/24 due to illness. As long as she feels okay, can work from home 7/10/24.        Sincerely,      NANDO Raphael [facility]

## 2024-07-10 ENCOUNTER — TELEPHONE (OUTPATIENT)
Dept: SURGERY | Facility: CLINIC | Age: 24
End: 2024-07-10
Payer: COMMERCIAL

## 2024-07-10 NOTE — TELEPHONE ENCOUNTER
Called patient prior to her upcoming appointment with Dr. Amos at the [facility] next week. Patient states she is from [place] and was seeing her GYN while home a couple of weeks ago.  Said she has bilateral nipple discharge and her GYN sent her for ultrasound.  She had that done and was told to see a breast surgeon.  Patient has images on a disc and report. Asked her to drop the disc off at the [facility]'s Radiology Film Room this week for her appointment next week.  Told her to bring a copy of the report to her appointment with Dr. Amos as well.  Support provided, invited calls.

## 2024-07-15 NOTE — PROGRESS NOTES
History:  This is a 24 year old woman who I'm asked to see by Dr. Biswas for evaluation of bilateral nipple discharge.  This has been going on for the last year.  Multiple times a week she will notice sticky fluid within her bra.  The right side tends to occur more often than the left.  The drainage is always spontaneous.  It is not reproducible.  She will occasionally have an inverted right nipple.  She overall feels like she has hard lumpy breasts.  She denies any blood in the discharge.  She denies any new breast rashes.  She has not had any acute vision changes.  She is only on oral contraceptive pills for her endometriosis.    Past medical history:  Asthma  Endometriosis  Obesity    Past surgical history:  Laparoscopy for endometriosis  Tonsillectomy and adenoidectomy    Medications:    albuterol (PROAIR HFA/PROVENTIL HFA/VENTOLIN HFA) 108 (90 Base) MCG/ACT inhaler, Inhale 2 puffs into the lungs every 6 hours as needed for shortness of breath, wheezing or cough, Disp: 18 g, Rfl: 0    albuterol (PROVENTIL) (2.5 MG/3ML) 0.083% neb solution, TAKE 3 ML (2.5 MG) EVERY 6 HOURS AS NEEDED BY NEBULIZATION FOR WHEEZING FOR UP TO 14 DAYS, Disp: , Rfl:     doxycycline hyclate (VIBRAMYCIN) 100 MG capsule, Take 1 capsule (100 mg) by mouth 2 times daily for 10 days, Disp: 20 capsule, Rfl: 0    drospirenone-ethinyl estradiol (JAMIE) 3-0.02 MG tablet, , Disp: , Rfl:     EPINEPHrine (ANY BX GENERIC EQUIV) 0.3 MG/0.3ML injection 2-pack, Inject 0.3 mLs (0.3 mg) into the muscle as needed for anaphylaxis, Disp: 2 each, Rfl: 1    fluticasone-salmeterol (ADVAIR) 500-50 MCG/ACT inhaler, Inhale 1 puff into the lungs every 12 hours, Disp: 60 each, Rfl: 1    fluticasone-salmeterol (AIRDUO RESPICLICK) 232-14 MCG/ACT inhaler, Inhale 1 puff into the lungs 2 times daily, Disp: 1 each, Rfl: 3    Allergies:  See list    Social history:  Denies alcohol, tobacco, and illicit drug use.  Occasionally uses marijuana.    Family history:  A paternal  "second cousin had breast cancer at 35 years old.    Review of systems:  General: No complaints or constitutional symptoms  Skin: No complaints or symptoms   Hematologic/Lymphatic: No symptoms or complaints  Psychiatric: No symptoms or complaints  Endocrine: No excessive fatigue, no hypermetabolic symptoms reported  Respiratory: No cough, shortness of breath, or wheezing  Cardiovascular: No chest pain or dyspnea on exertion  Breast: Bilateral spontaneous nipple discharge  Gastrointestinal: No abdominal pain, nausea, diarrhea, or constipation  Musculoskeletal: No recent injuries reported  Neurological: No focal neurologic defects reported.      Exam:  Ht 1.664 m (5' 5.5\")   Wt 97.5 kg (215 lb)   BMI 35.23 kg/m    Body mass index is 35.23 kg/m .  General: Alert, cooperative, appears stated age   Skin: Skin color, texture, turgor normal, no rashes or lesions   Lymphatic: No obvious adenopathy, no swelling   Eyes: No scleral icterus, pupils equal  HENT: No traumatic injury to the head or face, no gross abnormalities  Lungs: Normal respiratory effort, breath sounds equal bilaterally  Heart: Regular rate and rhythm  Breasts: No skin dimpling, palpable mass or adenopathy.  Nipple discharge could not be elicited from either side  Abdomen: Soft, non-distended and non-tender to palpation  Neurologic: Grossly intact    Imaging:  Pertinent images personally reviewed by myself and discussed with the patient.  Radiology reports:  Duct ectasia present with internal debris on both sides    Pathology:  None    Prolactin normal at 11.9    IMPRESSION:   Bilateral nipple discharge     PLAN:   Discussed the pathophysiology of nipple discharge, including the difference between physiologic and pathologic nipple discharge.  Physiologic nipple discharge is usually related to hyperprolactinemia.  This can be caused by trauma, stress (which could include as a surgical procedure), medications or anesthesia.  More often it is bilateral.  It " can be worked up with laboratories, including prolactin, UPT, thyroid function, and renal function.  Pathologic discharge is more commonly unilateral, localized to one duct, persistent, and spontaneous (as opposed to provoked).  Pathologic nipple discharge is most commonly due to intraductal papillomas, but 5-15% of the time, it is due to malignancy.  Based off of her presentation, it does not appear that she has physiologic discharge.  She has had appropriate laboratory workup through her endocrinologist and gynecologist.    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  I explained that mammary duct ectasia is a fairly common problem that can be seen in children, adolescents, and adults.  The ducts deep to the nipple become distended and there is fibrosis and inflammation.  This can lead to a sticky nipple discharge.  There is no specific treatment for this.  If there is a hygiene issue due to significant discharge, duct excision could be considered.      The patient states that the discharge is a significant issue that is messing with a lot of her bras.  She would be interested in duct excision for further diagnosis and treatment.  She understands that she would be unlikely to breast-feed if she were to have children in the future.  Will plan for a bilateral subareolar duct excision.  This is typically an outpatient procedure under local MAC anesthetic.  The risks and benefits of surgery explained, as well as the expected post-operative recovery.  All questions answered.  The patient would like to proceed, and she will schedule at her earliest convenience.    Indira Amos DO  General Surgeon  [facility]  Breast [address] [address]  Office: [phone]  Employed by - [employer]

## 2024-07-15 NOTE — H&P (VIEW-ONLY)
History:  This is a 24 year old woman who I'm asked to see by Dr. Biswas for evaluation of bilateral nipple discharge.  This has been going on for the last year.  Multiple times a week she will notice sticky fluid within her bra.  The right side tends to occur more often than the left.  The drainage is always spontaneous.  It is not reproducible.  She will occasionally have an inverted right nipple.  She overall feels like she has hard lumpy breasts.  She denies any blood in the discharge.  She denies any new breast rashes.  She has not had any acute vision changes.  She is only on oral contraceptive pills for her endometriosis.    Past medical history:  Asthma  Endometriosis  Obesity    Past surgical history:  Laparoscopy for endometriosis  Tonsillectomy and adenoidectomy    Medications:    albuterol (PROAIR HFA/PROVENTIL HFA/VENTOLIN HFA) 108 (90 Base) MCG/ACT inhaler, Inhale 2 puffs into the lungs every 6 hours as needed for shortness of breath, wheezing or cough, Disp: 18 g, Rfl: 0    albuterol (PROVENTIL) (2.5 MG/3ML) 0.083% neb solution, TAKE 3 ML (2.5 MG) EVERY 6 HOURS AS NEEDED BY NEBULIZATION FOR WHEEZING FOR UP TO 14 DAYS, Disp: , Rfl:     doxycycline hyclate (VIBRAMYCIN) 100 MG capsule, Take 1 capsule (100 mg) by mouth 2 times daily for 10 days, Disp: 20 capsule, Rfl: 0    drospirenone-ethinyl estradiol (JAMIE) 3-0.02 MG tablet, , Disp: , Rfl:     EPINEPHrine (ANY BX GENERIC EQUIV) 0.3 MG/0.3ML injection 2-pack, Inject 0.3 mLs (0.3 mg) into the muscle as needed for anaphylaxis, Disp: 2 each, Rfl: 1    fluticasone-salmeterol (ADVAIR) 500-50 MCG/ACT inhaler, Inhale 1 puff into the lungs every 12 hours, Disp: 60 each, Rfl: 1    fluticasone-salmeterol (AIRDUO RESPICLICK) 232-14 MCG/ACT inhaler, Inhale 1 puff into the lungs 2 times daily, Disp: 1 each, Rfl: 3    Allergies:  See list    Social history:  Denies alcohol, tobacco, and illicit drug use.  Occasionally uses marijuana.    Family history:  A paternal  "second cousin had breast cancer at 35 years old.    Review of systems:  General: No complaints or constitutional symptoms  Skin: No complaints or symptoms   Hematologic/Lymphatic: No symptoms or complaints  Psychiatric: No symptoms or complaints  Endocrine: No excessive fatigue, no hypermetabolic symptoms reported  Respiratory: No cough, shortness of breath, or wheezing  Cardiovascular: No chest pain or dyspnea on exertion  Breast: Bilateral spontaneous nipple discharge  Gastrointestinal: No abdominal pain, nausea, diarrhea, or constipation  Musculoskeletal: No recent injuries reported  Neurological: No focal neurologic defects reported.      Exam:  Ht 1.664 m (5' 5.5\")   Wt 97.5 kg (215 lb)   BMI 35.23 kg/m    Body mass index is 35.23 kg/m .  General: Alert, cooperative, appears stated age   Skin: Skin color, texture, turgor normal, no rashes or lesions   Lymphatic: No obvious adenopathy, no swelling   Eyes: No scleral icterus, pupils equal  HENT: No traumatic injury to the head or face, no gross abnormalities  Lungs: Normal respiratory effort, breath sounds equal bilaterally  Heart: Regular rate and rhythm  Breasts: No skin dimpling, palpable mass or adenopathy.  Nipple discharge could not be elicited from either side  Abdomen: Soft, non-distended and non-tender to palpation  Neurologic: Grossly intact    Imaging:  Pertinent images personally reviewed by myself and discussed with the patient.  Radiology reports:  Duct ectasia present with internal debris on both sides    Pathology:  None    Prolactin normal at 11.9    IMPRESSION:   Bilateral nipple discharge     PLAN:   Discussed the pathophysiology of nipple discharge, including the difference between physiologic and pathologic nipple discharge.  Physiologic nipple discharge is usually related to hyperprolactinemia.  This can be caused by trauma, stress (which could include as a surgical procedure), medications or anesthesia.  More often it is bilateral.  It " can be worked up with laboratories, including prolactin, UPT, thyroid function, and renal function.  Pathologic discharge is more commonly unilateral, localized to one duct, persistent, and spontaneous (as opposed to provoked).  Pathologic nipple discharge is most commonly due to intraductal papillomas, but 5-15% of the time, it is due to malignancy.  Based off of her presentation, it does not appear that she has physiologic discharge.  She has had appropriate laboratory workup through her endocrinologist and gynecologist.    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  I explained that mammary duct ectasia is a fairly common problem that can be seen in children, adolescents, and adults.  The ducts deep to the nipple become distended and there is fibrosis and inflammation.  This can lead to a sticky nipple discharge.  There is no specific treatment for this.  If there is a hygiene issue due to significant discharge, duct excision could be considered.      The patient states that the discharge is a significant issue that is messing with a lot of her bras.  She would be interested in duct excision for further diagnosis and treatment.  She understands that she would be unlikely to breast-feed if she were to have children in the future.  Will plan for a bilateral subareolar duct excision.  This is typically an outpatient procedure under local MAC anesthetic.  The risks and benefits of surgery explained, as well as the expected post-operative recovery.  All questions answered.  The patient would like to proceed, and she will schedule at her earliest convenience.    Indira Amos DO  General Surgeon  [facility]  Breast [address] [address]  Office: [phone]  Employed by - [facility]

## 2024-07-16 ENCOUNTER — THERAPY VISIT (OUTPATIENT)
Dept: OCCUPATIONAL THERAPY | Facility: CLINIC | Age: 24
End: 2024-07-16
Payer: COMMERCIAL

## 2024-07-16 DIAGNOSIS — M65.4 DE QUERVAIN'S TENOSYNOVITIS, RIGHT: Primary | ICD-10-CM

## 2024-07-16 PROCEDURE — 97140 MANUAL THERAPY 1/> REGIONS: CPT | Mod: GO

## 2024-07-16 PROCEDURE — 97035 APP MDLTY 1+ULTRASOUND EA 15: CPT | Mod: GO

## 2024-07-16 NOTE — PROGRESS NOTES
Hand Therapy Progress Note 7/16/24 07/16/24 0500   Appointment Info   Treating Provider Chayito Ventura OTR/L   Total/Authorized Visits 8 (POC)   Visits Used 5   Medical Diagnosis R DeQuervain's tenosynovitis, R ganglion cyst   OT Tx Diagnosis R wrist pain   Other pertinent information hx injury to R wrist 2022   Progress Note/Certification   Onset of Illness/Injury or Date of Surgery 05/13/24  (MD order date)   Therapy Frequency up to 1x week.   Predicted Duration 2 addl weeks   Progress Note Due Date 07/30/24   Progress Note Completed Date 07/16/24   Goals   OT Goals 1   OT Goal 1   Goal Description Pt will report 1/10 or less R wrist pain during resistive gripping IADLs for improved QOL and occupational function by 7/10/24.   Rationale In order to maximize safety and independence with ADL/IADLs   Goal Progress DeQ pain resolved, recurrence of ganglion cyst continues to cause pain   Target Date 07/10/24   Subjective Report   Subjective Report The cyst feels pointier. It isn't really hurting. The thumb and wrist pain are better.   OT Modalities   OT Modalities Ultrasound    Ultrasound   Ultrasound, Minutes (87805) 8 Minutes   Ultrasound -Type (does not include 3-5 min prep/cleanup time) Continuous;2 cm sound head   Intensity 1.0   Duration (does not include the 3-5 min set up/clean up time) 8 min   Frequency 3 MHz   Location R dorsal and radial wrist   Positioning sitting   Patient Response/Progress Tolerates well   Treatment Interventions (OT)   Interventions Neuromuscular Re-education;Manual Therapy   Self Care/Home Management   Self Care 1 Pt is instructed in use of brace, office ergonomics, and self massage.   Skilled Intervention pt education   Patient Response/Progress Demos good understanding   Neuromuscular Re-education   Neuromuscular Re-ed Minutes (76813) 3   Neuro Re-ed 1 Kinesiotaping   Neuro Re-ed 1 - Details Circumferential wrist   Skilled Intervention custom taping for gentle limitation of  motion, control of swelling   Patient Response/Progress Tolerates well, TBD   Therapeutic Procedure/Exercise   PTRx Ther Proc 1 Wrist Active Range of Motion Radial and Ulnar Deviation for deQuervains   PTRx Ther Proc 1 - Details HEP   PTRx Ther Proc 2 EPB active range of motion   PTRx Ther Proc 2 - Details HEP   PTRx Ther Proc 3 Wrist Active Range of Motion Extension/Flexion with Gravity Eliminated   PTRx Ther Proc 3 - Details HEP   PTRx Ther Proc 4 Forearm Passive Range of Motion Extensor Stretch   PTRx Ther Proc 4 - Details HEP   PTRx Ther Proc 5 Friction Massage   PTRx Ther Proc 5 - Details HEP   PTRx Ther Proc 6 Wrist Strengthening Flexion with Theraband   PTRx Ther Proc 6 - Details HEP   PTRx Ther Proc 7 Wrist Strengthening Extension with Theraband   PTRx Ther Proc 7 - Details HEP   PTRx Ther Proc 8 Wrist Strengthening Radial Deviation with Theraband   PTRx Ther Proc 8 - Details HEP   PTRx Ther Proc 9 Thumb Strengthening Palmar Abduction   PTRx Ther Proc 9 - Details HEP   PTRx Ther Proc 10 Hook Fist and Pull Back with a Marker for Intrinsic Hand Muscle Stretching   PTRx Ther Proc 10 - Details HEP   PTRx Ther Proc 11 Wrist Passive Range of Motion DeQuervain's Stretch   PTRx Ther Proc 11 - Details HEP   PTRx Ther Proc 12 Thumb Strengthening for deQuervains Phase 3   Therapeutic Exercise Notes 12 HEP   PTRx Ther Proc 13 Hand Strengthening Key Pinch   PTRx Ther Proc 13 - Details HEP   Skilled Intervention increased resistive HEP for resilience of tissue   Patient Response/Progress Tolerates well   Therapeutic Activity   PTRx Ther Act 1 Orthosis Wear and Care   PTRx Ther Act 2 Warmth   PTRx Ther Act 3 Ball Massage   Manual Therapy   Manual Therapy Minutes (12862) 10   Manual Therapy Manual Therapy 2   Manual Therapy 1 STM   Manual Therapy 1 - Details dorsal wrist   Manual Therapy 2 retrograde massage   Manual Therapy 2 - Details hand and wrist   Skilled Intervention manual techniques for improved blood flow,  decreased swelling   Patient Response/Progress Tolerates well   Education   Learner/Method Patient   Education Comments No barriers to learning   Plan   Home program HEP, use of brace, self-massage, activity mods/ergonomics   Plan for next session D/C   Total Session Time   Timed Code Treatment Minutes 21   Total Treatment Time (sum of timed and untimed services) 21         PLAN  Continue heat, self-massage, compression. Wrist brace as needed for pain/exacerbation of cyst. Can discontinue exercises d/t resolution of radial wrist pain.     Beginning/End Dates of Progress Note Reporting Period:  07/16/24 to 07/30/2024    Referring Provider:  Ruy Sewell

## 2024-07-17 ENCOUNTER — OFFICE VISIT (OUTPATIENT)
Dept: SURGERY | Facility: CLINIC | Age: 24
End: 2024-07-17
Attending: SURGERY
Payer: COMMERCIAL

## 2024-07-17 VITALS — HEIGHT: 66 IN | BODY MASS INDEX: 34.55 KG/M2 | WEIGHT: 215 LBS

## 2024-07-17 DIAGNOSIS — N64.52 BILATERAL NIPPLE DISCHARGE: Primary | ICD-10-CM

## 2024-07-17 PROCEDURE — 99204 OFFICE O/P NEW MOD 45 MIN: CPT | Performed by: SURGERY

## 2024-07-17 PROCEDURE — 99213 OFFICE O/P EST LOW 20 MIN: CPT | Performed by: SURGERY

## 2024-07-17 NOTE — NURSING NOTE
Idalmis presents to [facility] for a surgical consult with Dr. Amos  regarding bilateral nipple discharge.  Patient had imaging in [place], see images and reports for details.   RN assessment and EMR update.  Patient met with Dr. Amos .  See dictation for details of visit. She will plan surgical duct excision, bilateral .  Pre and post op teaching, written and verbal, provided to patient.  Walked her to Kyler/Chuck for surgery scheduling.  Follow up pending biopsy results.

## 2024-07-17 NOTE — LETTER
7/17/2024      Idalmis Swann  [address]  [place] [facility] [address]      Dear Colleague,    Thank you for referring your patient, Idalmis Swann, to the [facility] [place]. Please see a copy of my visit note below.    History:  This is a 24 year old woman who I'm asked to see by Dr. Biswas for evaluation of bilateral nipple discharge.  This has been going on for the last year.  Multiple times a week she will notice sticky fluid within her bra.  The right side tends to occur more often than the left.  The drainage is always spontaneous.  It is not reproducible.  She will occasionally have an inverted right nipple.  She overall feels like she has hard lumpy breasts.  She denies any blood in the discharge.  She denies any new breast rashes.  She has not had any acute vision changes.  She is only on oral contraceptive pills for her endometriosis.    Past medical history:  Asthma  Endometriosis  Obesity    Past surgical history:  Laparoscopy for endometriosis  Tonsillectomy and adenoidectomy    Medications:     albuterol (PROAIR HFA/PROVENTIL HFA/VENTOLIN HFA) 108 (90 Base) MCG/ACT inhaler, Inhale 2 puffs into the lungs every 6 hours as needed for shortness of breath, wheezing or cough, Disp: 18 g, Rfl: 0     albuterol (PROVENTIL) (2.5 MG/3ML) 0.083% neb solution, TAKE 3 ML (2.5 MG) EVERY 6 HOURS AS NEEDED BY NEBULIZATION FOR WHEEZING FOR UP TO 14 DAYS, Disp: , Rfl:      doxycycline hyclate (VIBRAMYCIN) 100 MG capsule, Take 1 capsule (100 mg) by mouth 2 times daily for 10 days, Disp: 20 capsule, Rfl: 0     drospirenone-ethinyl estradiol (JAMIE) 3-0.02 MG tablet, , Disp: , Rfl:      EPINEPHrine (ANY BX GENERIC EQUIV) 0.3 MG/0.3ML injection 2-pack, Inject 0.3 mLs (0.3 mg) into the muscle as needed for anaphylaxis, Disp: 2 each, Rfl: 1     fluticasone-salmeterol (ADVAIR) 500-50 MCG/ACT inhaler, Inhale 1 puff into the lungs every 12 hours, Disp: 60 each, Rfl: 1      "fluticasone-salmeterol (AIRDUO RESPICLICK) 232-14 MCG/ACT inhaler, Inhale 1 puff into the lungs 2 times daily, Disp: 1 each, Rfl: 3    Allergies:  See list    Social history:  Denies alcohol, tobacco, and illicit drug use.  Occasionally uses marijuana.    Family history:  A paternal second cousin had breast cancer at 35 years old.    Review of systems:  General: No complaints or constitutional symptoms  Skin: No complaints or symptoms   Hematologic/Lymphatic: No symptoms or complaints  Psychiatric: No symptoms or complaints  Endocrine: No excessive fatigue, no hypermetabolic symptoms reported  Respiratory: No cough, shortness of breath, or wheezing  Cardiovascular: No chest pain or dyspnea on exertion  Breast: Bilateral spontaneous nipple discharge  Gastrointestinal: No abdominal pain, nausea, diarrhea, or constipation  Musculoskeletal: No recent injuries reported  Neurological: No focal neurologic defects reported.      Exam:  Ht 1.664 m (5' 5.5\")   Wt 97.5 kg (215 lb)   BMI 35.23 kg/m    Body mass index is 35.23 kg/m .  General: Alert, cooperative, appears stated age   Skin: Skin color, texture, turgor normal, no rashes or lesions   Lymphatic: No obvious adenopathy, no swelling   Eyes: No scleral icterus, pupils equal  HENT: No traumatic injury to the head or face, no gross abnormalities  Lungs: Normal respiratory effort, breath sounds equal bilaterally  Heart: Regular rate and rhythm  Breasts: No skin dimpling, palpable mass or adenopathy.  Nipple discharge could not be elicited from either side  Abdomen: Soft, non-distended and non-tender to palpation  Neurologic: Grossly intact    Imaging:  Pertinent images personally reviewed by myself and discussed with the patient.  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  Physiologic nipple discharge is usually related to hyperprolactinemia.  This can be caused by trauma, stress (which could include as a surgical procedure), medications or anesthesia.  More often it is bilateral.  It can be worked up with laboratories, including prolactin, UPT, thyroid function, and renal function.  Pathologic discharge is more commonly unilateral, localized to one duct, persistent, and spontaneous (as opposed to provoked).  Pathologic nipple discharge is most commonly due to intraductal papillomas, but 5-15% of the time, it is due to malignancy.  Based off of her presentation, it does not appear that she has physiologic discharge.  She has had appropriate laboratory workup through her endocrinologist and gynecologist.    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  I explained that mammary duct ectasia is a fairly common problem that can be seen in children, adolescents, and adults.  The ducts deep to the nipple become distended and there is fibrosis and inflammation.  This can lead to a sticky nipple discharge.  There is no specific treatment for this.  If there is a hygiene issue due to significant discharge, duct excision could be considered.      The patient states that the discharge is a significant issue that is messing with a lot of her bras.  She would be interested in duct excision for further diagnosis and treatment.  She understands that she would be unlikely to breast-feed if she were to have children in the future.  Will plan for a bilateral subareolar duct excision.  This is typically an outpatient procedure under local MAC anesthetic.  The risks and benefits of surgery explained, as well as the expected post-operative recovery.  All questions answered.  The patient would like to proceed, and she will schedule at her earliest convenience.    Indira Amos DO  General Surgeon  [facility]  [facility]  [address] [address]  Office: [phone]  Employed by - [facility]      Again, thank you for allowing me to participate in the care of your patient.        Sincerely,        Indira Amos, DO

## 2024-07-29 RX ORDER — DROSPIRENONE 4 MG/1
4 TABLET, FILM COATED ORAL DAILY
COMMUNITY
Start: 2024-07-16 | End: 2024-07-29

## 2024-07-31 ENCOUNTER — ANESTHESIA EVENT (OUTPATIENT)
Dept: SURGERY | Facility: AMBULATORY SURGERY CENTER | Age: 24
End: 2024-07-31
Payer: COMMERCIAL

## 2024-08-01 ENCOUNTER — ANESTHESIA (OUTPATIENT)
Dept: SURGERY | Facility: AMBULATORY SURGERY CENTER | Age: 24
End: 2024-08-01
Payer: COMMERCIAL

## 2024-08-01 ENCOUNTER — HOSPITAL ENCOUNTER (OUTPATIENT)
Facility: AMBULATORY SURGERY CENTER | Age: 24
Discharge: HOME | End: 2024-08-01
Attending: SURGERY
Payer: COMMERCIAL

## 2024-08-01 VITALS
HEART RATE: 92 BPM | RESPIRATION RATE: 16 BRPM | DIASTOLIC BLOOD PRESSURE: 74 MMHG | WEIGHT: 216 LBS | OXYGEN SATURATION: 97 % | TEMPERATURE: 97 F | SYSTOLIC BLOOD PRESSURE: 125 MMHG | BODY MASS INDEX: 34.72 KG/M2 | HEIGHT: 66 IN

## 2024-08-01 DIAGNOSIS — N64.52 BILATERAL NIPPLE DISCHARGE: ICD-10-CM

## 2024-08-01 PROCEDURE — 19120 REMOVAL OF BREAST LESION: CPT | Mod: 50 | Performed by: SURGERY

## 2024-08-01 RX ORDER — NALOXONE HYDROCHLORIDE 0.4 MG/ML
0.1 INJECTION, SOLUTION INTRAMUSCULAR; INTRAVENOUS; SUBCUTANEOUS
Status: DISCONTINUED | OUTPATIENT
Start: 2024-08-01 | End: 2024-08-02 | Stop reason: HOSPADM

## 2024-08-01 RX ORDER — ONDANSETRON 2 MG/ML
4 INJECTION INTRAMUSCULAR; INTRAVENOUS EVERY 30 MIN PRN
Status: DISCONTINUED | OUTPATIENT
Start: 2024-08-01 | End: 2024-08-02 | Stop reason: HOSPADM

## 2024-08-01 RX ORDER — FENTANYL CITRATE 50 UG/ML
INJECTION, SOLUTION INTRAMUSCULAR; INTRAVENOUS PRN
Status: DISCONTINUED | OUTPATIENT
Start: 2024-08-01 | End: 2024-08-01

## 2024-08-01 RX ORDER — OXYCODONE HYDROCHLORIDE 5 MG/1
5 TABLET ORAL
Status: COMPLETED | OUTPATIENT
Start: 2024-08-01 | End: 2024-08-01

## 2024-08-01 RX ORDER — ONDANSETRON 2 MG/ML
INJECTION INTRAMUSCULAR; INTRAVENOUS PRN
Status: DISCONTINUED | OUTPATIENT
Start: 2024-08-01 | End: 2024-08-01

## 2024-08-01 RX ORDER — GLYCOPYRROLATE 0.2 MG/ML
INJECTION, SOLUTION INTRAMUSCULAR; INTRAVENOUS PRN
Status: DISCONTINUED | OUTPATIENT
Start: 2024-08-01 | End: 2024-08-01

## 2024-08-01 RX ORDER — SODIUM CHLORIDE, SODIUM LACTATE, POTASSIUM CHLORIDE, CALCIUM CHLORIDE 600; 310; 30; 20 MG/100ML; MG/100ML; MG/100ML; MG/100ML
INJECTION, SOLUTION INTRAVENOUS CONTINUOUS
Status: DISCONTINUED | OUTPATIENT
Start: 2024-08-01 | End: 2024-08-02 | Stop reason: HOSPADM

## 2024-08-01 RX ORDER — TRAMADOL HYDROCHLORIDE 50 MG/1
50 TABLET ORAL EVERY 6 HOURS PRN
Qty: 10 TABLET | Refills: 0 | Status: SHIPPED | OUTPATIENT
Start: 2024-08-01 | End: 2024-08-04

## 2024-08-01 RX ORDER — PROPOFOL 10 MG/ML
INJECTION, EMULSION INTRAVENOUS CONTINUOUS PRN
Status: DISCONTINUED | OUTPATIENT
Start: 2024-08-01 | End: 2024-08-01

## 2024-08-01 RX ORDER — DEXAMETHASONE SODIUM PHOSPHATE 4 MG/ML
4 INJECTION, SOLUTION INTRA-ARTICULAR; INTRALESIONAL; INTRAMUSCULAR; INTRAVENOUS; SOFT TISSUE
Status: DISCONTINUED | OUTPATIENT
Start: 2024-08-01 | End: 2024-08-02 | Stop reason: HOSPADM

## 2024-08-01 RX ORDER — OXYCODONE HYDROCHLORIDE 10 MG/1
10 TABLET ORAL
Status: DISCONTINUED | OUTPATIENT
Start: 2024-08-01 | End: 2024-08-02 | Stop reason: HOSPADM

## 2024-08-01 RX ORDER — LIDOCAINE 40 MG/G
CREAM TOPICAL
Status: DISCONTINUED | OUTPATIENT
Start: 2024-08-01 | End: 2024-08-02 | Stop reason: HOSPADM

## 2024-08-01 RX ORDER — DEXAMETHASONE SODIUM PHOSPHATE 4 MG/ML
INJECTION, SOLUTION INTRA-ARTICULAR; INTRALESIONAL; INTRAMUSCULAR; INTRAVENOUS; SOFT TISSUE PRN
Status: DISCONTINUED | OUTPATIENT
Start: 2024-08-01 | End: 2024-08-01

## 2024-08-01 RX ORDER — LIDOCAINE HYDROCHLORIDE 20 MG/ML
INJECTION, SOLUTION INFILTRATION; PERINEURAL PRN
Status: DISCONTINUED | OUTPATIENT
Start: 2024-08-01 | End: 2024-08-01

## 2024-08-01 RX ORDER — ONDANSETRON 4 MG/1
4 TABLET, ORALLY DISINTEGRATING ORAL EVERY 30 MIN PRN
Status: DISCONTINUED | OUTPATIENT
Start: 2024-08-01 | End: 2024-08-02 | Stop reason: HOSPADM

## 2024-08-01 RX ADMIN — DEXAMETHASONE SODIUM PHOSPHATE 4 MG: 4 INJECTION, SOLUTION INTRA-ARTICULAR; INTRALESIONAL; INTRAMUSCULAR; INTRAVENOUS; SOFT TISSUE at 11:44

## 2024-08-01 RX ADMIN — ONDANSETRON 4 MG: 2 INJECTION INTRAMUSCULAR; INTRAVENOUS at 11:44

## 2024-08-01 RX ADMIN — LIDOCAINE HYDROCHLORIDE 2 ML: 20 INJECTION, SOLUTION INFILTRATION; PERINEURAL at 11:39

## 2024-08-01 RX ADMIN — GLYCOPYRROLATE 0.2 MG: 0.2 INJECTION, SOLUTION INTRAMUSCULAR; INTRAVENOUS at 11:39

## 2024-08-01 RX ADMIN — FENTANYL CITRATE 25 MCG: 50 INJECTION, SOLUTION INTRAMUSCULAR; INTRAVENOUS at 11:46

## 2024-08-01 RX ADMIN — PROPOFOL 200 MCG/KG/MIN: 10 INJECTION, EMULSION INTRAVENOUS at 11:39

## 2024-08-01 RX ADMIN — FENTANYL CITRATE 25 MCG: 50 INJECTION, SOLUTION INTRAMUSCULAR; INTRAVENOUS at 12:03

## 2024-08-01 RX ADMIN — OXYCODONE HYDROCHLORIDE 5 MG: 5 TABLET ORAL at 13:04

## 2024-08-01 RX ADMIN — FENTANYL CITRATE 50 MCG: 50 INJECTION, SOLUTION INTRAMUSCULAR; INTRAVENOUS at 11:39

## 2024-08-01 RX ADMIN — SODIUM CHLORIDE, SODIUM LACTATE, POTASSIUM CHLORIDE, CALCIUM CHLORIDE: 600; 310; 30; 20 INJECTION, SOLUTION INTRAVENOUS at 10:23

## 2024-08-01 NOTE — ANESTHESIA CARE TRANSFER NOTE
Patient: Idalmis Swann    Procedure: Procedure(s):  SUBAREOLAR DUCT EXCISION       Diagnosis: Bilateral nipple discharge [N64.52]  Diagnosis Additional Information: No value filed.    Anesthesia Type:   MAC     Note:    Oropharynx: oropharynx clear of all foreign objects and spontaneously breathing  Level of Consciousness: awake  Oxygen Supplementation: room air    Independent Airway: airway patency satisfactory and stable  Dentition: dentition unchanged  Vital Signs Stable: post-procedure vital signs reviewed and stable  Report to RN Given: handoff report given  Patient transferred to: Phase II    Handoff Report: Identifed the Patient, Identified the Reponsible Provider, Reviewed the pertinent medical history, Discussed the surgical course, Reviewed Intra-OP anesthesia mangement and issues during anesthesia, Set expectations for post-procedure period and Allowed opportunity for questions and acknowledgement of understanding      Vitals:  Vitals Value Taken Time   /59 08/01/24 1219   Temp 97  F (36.1  C) 08/01/24 1219   Pulse 72 08/01/24 1219   Resp 16 08/01/24 1219   SpO2 96 % 08/01/24 1219       Electronically Signed By: RAJESH Mark CRNA  August 1, 2024  12:23 PM

## 2024-08-01 NOTE — ANESTHESIA POSTPROCEDURE EVALUATION
Patient: Idalmis Swann    Procedure: Procedure(s):  SUBAREOLAR DUCT EXCISION       Anesthesia Type:  MAC    Note:  Disposition: Outpatient   Postop Pain Control: Uneventful            Sign Out: Well controlled pain   PONV: No   Neuro/Psych: Uneventful            Sign Out: Acceptable/Baseline neuro status   Airway/Respiratory: Uneventful            Sign Out: Acceptable/Baseline resp. status   CV/Hemodynamics: Uneventful            Sign Out: Acceptable CV status; No obvious hypovolemia; No obvious fluid overload   Other NRE: NONE   DID A NON-ROUTINE EVENT OCCUR? No           Last vitals:  Vitals Value Taken Time   /74 08/01/24 1400   Temp 97  F (36.1  C) 08/01/24 1219   Pulse 94 08/01/24 1405   Resp 16 08/01/24 1304   SpO2 97 % 08/01/24 1405   Vitals shown include unfiled device data.    Electronically Signed By: Dillon Zavaal MD  August 1, 2024  2:07 PM

## 2024-08-01 NOTE — OP NOTE
Name:  Idalmis HANNAH Shree  PCP:  Samira Fonseca  Procedure Date:  8/1/2024      BILATERAL SUBAREOLAR DUCT EXCISION      Pre-Procedure Diagnosis:  Bilateral nipple discharge     Post-Procedure Diagnosis:    Bilateral nipple discharge     Surgeon:  Indira Amos DO    Anesthesia Type:    MAC    Estimated Blood Loss:   1 mL    Specimens:    Bilateral breast subareolar tissue    Complications:    None apparent    Indication for procedure:  This is a 24-year-old female who has been suffering from significant spontaneous bilateral nipple discharge.  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  Due to the significant discharge it has led to hygiene issues.  She has elected for duct excision for further treatment.    Operative Report:    After informed consent was obtained, and the risks and benefits of the procedure were discussed, the patient was brought back to the operative suite and placed in the supine position.  MAC anesthesia was provided by the anesthesia department.  A timeout was performed and the bilateral breasts were prepped and draped in the usual sterile fashion.  Local anesthetic was administered prior to incision.  A circumareolar incision was made centered at 3 o'clock within the left breast.  Electrocautery was then used to dissect into the subcutaneous tissues and detach the ducts from behind the nipple.  Dissection continued into the breast parenchyma and a core of tissue was removed.  The tissue was marked for orientation.  The tissue was sent to the lab for permanent sectioning.  Hemostasis was assured within the cavity.  The void was reapproximated with a pursestring 3-0 Vicryl.  The skin was then closed in an interrupted fashion with 4-0 Vicryl, followed by a running subcuticular 4-0 Monocryl.  Attention was then directed to the right side where a mirror periareolar incision was made at 9 o'clock after local anesthetic was administered.  Electrocautery was utilized to  dissect medial to detach the breast parenchyma from behind the nipple.  A core of tissue was removed down into the breast.  The core of tissue was marked anteriorly for orientation and sent to the lab.  Hemostasis was assured within the cavity.  The void was approximated with pursestring 3-0 Vicryl.  The incision was closed with interrupted 4-0 Vicryl followed by running subcuticular 4-0 Monocryl.  Dermabond was placed over each incision.    Disposition:  The patient tolerated the procedure well, and was transferred to the postprocedural care unit in stable condition.      Indira Amos DO  General Surgeon  [facility]  Breast [facility]  [address] [place] [address]  Office: [phone]  Employed by - [facility]

## 2024-08-01 NOTE — DISCHARGE INSTRUCTIONS
If you have any questions or concerns regarding your procedure, please contact Dr. Indira Amos, her office number is [phone].       Same-Day Surgery   Adult Discharge Orders & Instructions     For 24 hours after surgery    Get plenty of rest.  A responsible adult must stay with you for at least 24 hours after you leave the hospital.     Do not drive or use heavy equipment.  If you have weakness or tingling, don't drive or use heavy equipment until this feeling goes away.    Do not drink alcohol.    Avoid strenuous or risky activities.  Ask for help when climbing stairs.     You may feel lightheaded.  If so, sit for a few minutes before standing.  Have someone help you get up.     You may have a slight fever. Call the doctor if your fever is over 100 F (37.7 C) (taken under the tongue) or lasts longer than 24 hours.    You may have a dry mouth, a sore throat, muscle aches or trouble sleeping.  These should go away after 24 hours.    Do not make important or legal decisions.    Based on the surgery/procedure that you had today, we do not expect that you will have any problems.  However, we want you to know what to do if you have pain, nausea, bleeding, or infection:    To control pain:  Take medicines your physician has prescribed or or over-the counter medicine he or she advises.  Ice packs and periods of rest are often helpful.  For surgery on an arm or leg, raise it on a pillow to ease swelling.  If your pain is not managed with the above methods, contact your physician.    To control nausea:  Take anti-nausea medicine approved by your physician.  Drink clear liquids such as apple juice, ginger ale, broth or 7-Up. Be sure to drink enough fluids.  Move to a regular diet as you feel able.  Rest may also help.    Bleeding:  You may see a little blood on your dressing, about the size of a quarter in the first 24 hours.  If you see this, there is no reason to be alarmed.  However, if this continues to  increase in size, apply pressure if able, and notify your physician.    Infection: Please contact your physician if you have any of the following signs:  redness, swelling, heat, increasing pain or foul-smelling drainage at your surgery site, fever or chills.    Call your doctor for any of the followin.  It has been over 8 to 10 hours since surgery and you are still not able to urinate (pass water).    2.  Headache for over 24 hours.

## 2024-08-01 NOTE — INTERVAL H&P NOTE
Patient seen in preop with her mother.  Denies new medical history since she was last seen.  All questions answered regarding procedure.  Consent obtained.  To the OR for bilateral subareolar duct excision.    Indira Amos DO  General Surgeon  [facility]  Breast [address] [address]  Office: [phone]  Employed by - [facility]

## 2024-08-01 NOTE — ANESTHESIA PREPROCEDURE EVALUATION
Anesthesia Pre-Procedure Evaluation    Patient: Idalmis Swann   MRN: [id number] : 2000        Procedure : Procedure(s):  SUBAREOLAR DUCT EXCISION          Past Medical History:   Diagnosis Date    Anemia, iron deficiency     Endometriosis 2021    documented by photo/visualization.  no biopsy performed.    Obese     Thyroid disease     Uncomplicated asthma       Past Surgical History:   Procedure Laterality Date    LAPAROSCOPY DIAGNOSTIC (GYN)  2021    Surgery to remove endometriosis unable to remove all. Some on colon    TONSILLECTOMY & ADENOIDECTOMY  2023      Allergies   Allergen Reactions    Cat Hair Extract Shortness Of Breath    Vitex Swelling     Hazelnut tree allergy skin test    Bactrim [Sulfamethoxazole-Trimethoprim]     Elemental Sulfur     Penicillins     Sulfa Antibiotics Nausea and Vomiting    Tree Extract      Hazelnut tree allergy skin test    Alternaria Alternata Allergy Skin Test Rash    Dog Epithelium (Canis Lupus Familiaris) Itching    Latex Rash    Mold Cough      Social History     Tobacco Use    Smoking status: Never    Smokeless tobacco: Never   Substance Use Topics    Alcohol use: Not Currently      Wt Readings from Last 1 Encounters:   24 98 kg (216 lb)        Anesthesia Evaluation   Pt has had prior anesthetic.         ROS/MED HX  ENT/Pulmonary:     (+)                      asthma                  Neurologic:  - neg neurologic ROS     Cardiovascular:  - neg cardiovascular ROS     METS/Exercise Tolerance: >4 METS    Hematologic:  - neg hematologic  ROS     Musculoskeletal:  - neg musculoskeletal ROS     GI/Hepatic:  - neg GI/hepatic ROS     Renal/Genitourinary:  - neg Renal ROS     Endo:  - neg endo ROS   (+)          thyroid problem,     Obesity,       Psychiatric/Substance Use:  - neg psychiatric ROS     Infectious Disease:  - neg infectious disease ROS     Malignancy:  - neg malignancy ROS     Other:  - neg other ROS          Physical Exam    Airway       "  Mallampati: II   TM distance: > 3 FB   Neck ROM: full   Mouth opening: > 3 cm    Respiratory Devices and Support         Dental  no notable dental history     (+) Minor Abnormalities - some fillings, tiny chips      Cardiovascular   cardiovascular exam normal          Pulmonary   pulmonary exam normal                OUTSIDE LABS:  CBC:   Lab Results   Component Value Date    WBC 13.7 (H) 05/22/2024    WBC 13.3 (H) 02/07/2024    HGB 13.5 05/22/2024    HGB 13.3 02/07/2024    HCT 40.1 05/22/2024    HCT 39.1 02/07/2024     05/22/2024     02/07/2024     BMP:   Lab Results   Component Value Date     05/22/2024     02/07/2024    POTASSIUM 4.2 05/22/2024    POTASSIUM 4.8 02/07/2024    CHLORIDE 103 05/22/2024    CHLORIDE 104 02/07/2024    CO2 23 05/22/2024    CO2 22 02/07/2024    BUN 9.1 05/22/2024    BUN 10.6 02/07/2024    CR 0.65 05/22/2024    CR 0.76 02/07/2024    GLC 80 05/22/2024    GLC 88 02/07/2024     COAGS: No results found for: \"PTT\", \"INR\", \"FIBR\"  POC: No results found for: \"BGM\", \"HCG\", \"HCGS\"  HEPATIC:   Lab Results   Component Value Date    ALBUMIN 4.3 05/22/2024    PROTTOTAL 7.6 05/22/2024    ALT 13 05/22/2024    AST 20 05/22/2024    ALKPHOS 85 05/22/2024    BILITOTAL 0.2 05/22/2024     OTHER:   Lab Results   Component Value Date    A1C 5.2 05/22/2024    SUNDAR 9.7 05/22/2024    MAG 2.1 05/22/2024    TSH 2.23 05/22/2024    T3 193 05/22/2024       Anesthesia Plan    ASA Status:  2    NPO Status:  NPO Appropriate    Anesthesia Type: MAC.   Induction: Intravenous, Propofol.   Maintenance: TIVA.        Consents    Anesthesia Plan(s) and associated risks, benefits, and realistic alternatives discussed. Questions answered and patient/representative(s) expressed understanding.     - Discussed: Risks, Benefits and Alternatives for BOTH SEDATION and the PROCEDURE were discussed     - Discussed with:  Patient       - Patient is DNR/DNI Status: No          Postoperative Care    Pain management: " "IV analgesics, Oral pain medications, Multi-modal analgesia.   PONV prophylaxis: Ondansetron (or other 5HT-3), Dexamethasone or Solumedrol     Comments:    Other Comments: TIVA with Propofol  Zofran for PONV           Dillon Zavala MD    I have reviewed the pertinent notes and labs in the chart from the past 30 days and (re)examined the patient.  Any updates or changes from those notes are reflected in this note.              # Obesity: Estimated body mass index is 34.86 kg/m  as calculated from the following:    Height as of this encounter: 1.676 m (5' 6\").    Weight as of this encounter: 98 kg (216 lb).      "

## 2024-09-23 ENCOUNTER — TELEPHONE (OUTPATIENT)
Dept: SCHEDULING | Facility: CLINIC | Age: 24
End: 2024-09-23
Payer: COMMERCIAL

## 2024-09-23 ASSESSMENT — ASTHMA QUESTIONNAIRES
EMERGENCY_ROOM_LAST_YEAR_TOTAL: TWO
QUESTION_1 LAST FOUR WEEKS HOW MUCH OF THE TIME DID YOUR ASTHMA KEEP YOU FROM GETTING AS MUCH DONE AT WORK, SCHOOL OR AT HOME: NONE OF THE TIME
ACT_TOTALSCORE: 24
ACT_TOTALSCORE: 24
QUESTION_4 LAST FOUR WEEKS HOW OFTEN HAVE YOU USED YOUR RESCUE INHALER OR NEBULIZER MEDICATION (SUCH AS ALBUTEROL): NOT AT ALL
QUESTION_5 LAST FOUR WEEKS HOW WOULD YOU RATE YOUR ASTHMA CONTROL: COMPLETELY CONTROLLED
QUESTION_3 LAST FOUR WEEKS HOW OFTEN DID YOUR ASTHMA SYMPTOMS (WHEEZING, COUGHING, SHORTNESS OF BREATH, CHEST TIGHTNESS OR PAIN) WAKE YOU UP AT NIGHT OR EARLIER THAN USUAL IN THE MORNING: NOT AT ALL
QUESTION_2 LAST FOUR WEEKS HOW OFTEN HAVE YOU HAD SHORTNESS OF BREATH: ONCE OR TWICE A WEEK

## 2024-09-23 NOTE — TELEPHONE ENCOUNTER
Called patient to schedule appointment. Patient shares that she is getting at least two migraines a week and needs referral. Reviewed available appointments this week and distance from where patient lives.  Accepted appointment tomorrow in [place].      No further action required

## 2024-09-23 NOTE — TELEPHONE ENCOUNTER
Reason for Call:  Appointment Request    Patient requesting this type of appt: Chronic Diease Management/Medication/Follow-Up    Requested provider:  Any provider available    Reason patient unable to be scheduled: Not within requested timeframe    When does patient want to be seen/preferred time: 3-7 days    Comments: Pt is planning to establish with the [facility] going forward and currently has an appointment scheduled, but is requesting to be fit in sooner if possible. States her chronic migraines are worse than previous and is requesting an appointment to discuss and obtain referral for neurology.    Could we send this information to you in [employer] or would you prefer to receive a phone call?:   Patient would prefer a phone call   Okay to leave a detailed message?: Yes at Cell number on file:    Telephone Information:   Mobile [phone]       Call taken on 9/23/2024 at 4:26 PM by Letty Edwards

## 2024-09-24 ENCOUNTER — OFFICE VISIT (OUTPATIENT)
Dept: INTERNAL MEDICINE | Facility: CLINIC | Age: 24
End: 2024-09-24
Payer: COMMERCIAL

## 2024-09-24 VITALS
WEIGHT: 219.7 LBS | RESPIRATION RATE: 16 BRPM | OXYGEN SATURATION: 94 % | TEMPERATURE: 97.9 F | DIASTOLIC BLOOD PRESSURE: 72 MMHG | HEIGHT: 66 IN | SYSTOLIC BLOOD PRESSURE: 112 MMHG | BODY MASS INDEX: 35.31 KG/M2 | HEART RATE: 92 BPM

## 2024-09-24 DIAGNOSIS — G43.B1 INTRACTABLE OPHTHALMOPLEGIC MIGRAINE: Primary | ICD-10-CM

## 2024-09-24 PROCEDURE — 99213 OFFICE O/P EST LOW 20 MIN: CPT | Mod: 24

## 2024-09-24 RX ORDER — TOPIRAMATE 25 MG/1
25 CAPSULE, EXTENDED RELEASE ORAL DAILY
Qty: 90 CAPSULE | Refills: 3 | Status: SHIPPED | OUTPATIENT
Start: 2024-09-24

## 2024-09-24 ASSESSMENT — PAIN SCALES - GENERAL: PAINLEVEL: MILD PAIN (3)

## 2024-09-24 NOTE — PROGRESS NOTES
"  Assessment & Plan     (G43.B1) Intractable ophthalmoplegic migraine  (primary encounter diagnosis)  Comment: migraines with aura have incidences of 1-2 times per week. Pt states that her aura is blurred vision. Pt is unable to drive or work when she has migraines.  Plan: topiramate ER (QUDEXY XR) 25 MG 24 hr capsule        Prescription sent to pharmacy          BMI  Estimated body mass index is 35.46 kg/m  as calculated from the following:    Height as of this encounter: 1.676 m (5' 6\").    Weight as of this encounter: 99.7 kg (219 lb 11.2 oz).             Judy Raygoza is a 24 year old, presenting for the following health issues: Pt states that she has a hx of migraines. Pt was given magnesium and Co Q 10 and vit B12 previously for migraines that eventually stopped being effective. She was prescribed metformin for weight loss and stopped taking it. Pt was on birth control but was at risk for blood clotting factors so she stopped her birth control. Since she stopped the metformin in May her migraines with aura have commenced with incidences of 1-2 times per week. Pt states that her aura is blurred vision. Pt is unable to drive or work when she has migraines. Pt is taking vit B complex, vit D3, 1 omega, sendy root pill and MVI. Discussed different medications that are effective for migraine treatment. Pt states that she takes excedrine for acute treatment that has stopped being effective for her. Pt agreed that topiramate was a good fit for her and also provides the added benefit of weight loss. Explained to pt to take 25mg daily for one full week and if migraines are still occurring to increase the dose to 50mg daily for a week. Emphasized the fact to not increase the dose prematurely and allow her body to adjust to the increased dose for a full 7 days and if migraines are still occurring to take an NSAID for acute migraine treatment. Instructed pt to reach out if she is experiencing adverse events that are " "unpleasant and another medication can be prescribed.    Pt has a hx of pitting edema to legs. Presently there is only slight nonpitting edema present mostly in her left leg. There is no hx of trauma to legs but has a hx of Raynaud's. Discussed elevation, compression stockings for conservative treatment along with increasing her activity level.  Migraine (Pt has Hx of migraines, has seen a neurologist in [place], they had stopped, but recently she has been getting them more frequently/)        9/24/2024     6:56 AM   Additional Questions   Roomed by Breanne ROBERTS   Accompanied by n/a     History of Present Illness       Reason for visit:  Migraines   She is taking medications regularly.                 Review of Systems  Constitutional, HEENT, cardiovascular, pulmonary, gi and gu systems are negative, except as otherwise noted.      Objective    /72 (BP Location: Right arm, Cuff Size: Adult Large)   Pulse 92   Temp 97.9  F (36.6  C) (Tympanic)   Resp 16   Ht 1.676 m (5' 6\")   Wt 99.7 kg (219 lb 11.2 oz)   LMP 09/18/2024 (Exact Date)   SpO2 94%   BMI 35.46 kg/m    Body mass index is 35.46 kg/m .  Physical Exam   GENERAL: alert and no distress  NECK: no adenopathy, no asymmetry, masses, or scars  RESP: lungs clear to auscultation - no rales, rhonchi or wheezes  CV: regular rate and rhythm, normal S1 S2, no S3 or S4, no murmur, click or rub, slight nonpitting peripheral edema to LLE, pt reports chronic condition with Raynauds  ABDOMEN: soft, nontender, no hepatosplenomegaly, no masses and bowel sounds normal  MS: no gross musculoskeletal defects noted  SKIN: no suspicious lesions or rashes  NEURO: Normal strength and tone, mentation intact and speech normal  PSYCH: mentation appears normal, affect normal/bright            Signed Electronically by: RAJESH Chacon CNP    "

## 2024-09-24 NOTE — PATIENT INSTRUCTIONS
Topiramate 25mg: take daily for a week.     If migraines are still happening then increase dose to 50 mg per day for the week. Make sure that you do not increase the dose until a full week has passed.    Increase to 75mg per day if migraines are still happening on the 50mg dose.    Reach out to me if you are experiencing adverse reactions that are unpleasant.

## 2024-10-02 ENCOUNTER — OFFICE VISIT (OUTPATIENT)
Dept: INTERNAL MEDICINE | Facility: CLINIC | Age: 24
End: 2024-10-02
Payer: COMMERCIAL

## 2024-10-02 VITALS
TEMPERATURE: 98.9 F | OXYGEN SATURATION: 97 % | BODY MASS INDEX: 35.27 KG/M2 | WEIGHT: 218.5 LBS | SYSTOLIC BLOOD PRESSURE: 124 MMHG | HEART RATE: 124 BPM | DIASTOLIC BLOOD PRESSURE: 72 MMHG

## 2024-10-02 DIAGNOSIS — R50.9 FEVER, UNSPECIFIED FEVER CAUSE: Primary | ICD-10-CM

## 2024-10-02 DIAGNOSIS — J06.9 VIRAL UPPER RESPIRATORY TRACT INFECTION: ICD-10-CM

## 2024-10-02 DIAGNOSIS — Z20.828 EXPOSURE TO INFLUENZA: ICD-10-CM

## 2024-10-02 DIAGNOSIS — B96.89 BACTERIAL CONJUNCTIVITIS OF BOTH EYES: ICD-10-CM

## 2024-10-02 DIAGNOSIS — H10.9 BACTERIAL CONJUNCTIVITIS OF BOTH EYES: ICD-10-CM

## 2024-10-02 DIAGNOSIS — R05.1 ACUTE COUGH: ICD-10-CM

## 2024-10-02 PROBLEM — J45.40 MODERATE PERSISTENT ASTHMA WITHOUT COMPLICATION: Status: ACTIVE | Noted: 2022-12-12

## 2024-10-02 PROBLEM — N80.9 ENDOMETRIOSIS: Status: ACTIVE | Noted: 2021-03-03

## 2024-10-02 PROCEDURE — 99214 OFFICE O/P EST MOD 30 MIN: CPT | Performed by: NURSE PRACTITIONER

## 2024-10-02 PROCEDURE — 87637 SARSCOV2&INF A&B&RSV AMP PRB: CPT | Performed by: NURSE PRACTITIONER

## 2024-10-02 RX ORDER — OSELTAMIVIR PHOSPHATE 75 MG/1
75 CAPSULE ORAL 2 TIMES DAILY
Qty: 10 CAPSULE | Refills: 0 | Status: SHIPPED | OUTPATIENT
Start: 2024-10-02 | End: 2024-10-07

## 2024-10-02 RX ORDER — ERYTHROMYCIN 5 MG/G
0.5 OINTMENT OPHTHALMIC 4 TIMES DAILY
Qty: 3.5 G | Refills: 0 | Status: SHIPPED | OUTPATIENT
Start: 2024-10-02 | End: 2024-10-07

## 2024-10-02 ASSESSMENT — VISUAL ACUITY: OU: 1

## 2024-10-02 NOTE — PROGRESS NOTES
"Assessment & Plan     (R50.9) Fever, unspecified fever cause  (primary encounter diagnosis)  (R05.9) Cough  (J06.9) Viral upper respiratory tract infection  (Z20.828) Exposure to influenza  Comment: Suspect influenza given high fever, URI symptoms, and cough following exposure x2 to influenza at work.  Treat with Tamiflu given underlying asthma, though it is >48 hours since symptom onset.   Continue supportive cares. Work note provided to allow her to work from home, if she is able.  Discussed asthma management, should she experience exacerbation.  Discussed signs/symptoms of secondary bacterial infection, including pneumonia, that would warrant further follow-up care.  Plan: oseltamivir (TAMIFLU) 75 MG capsule, Symptomatic Influenza A/B, RSV, & SARS-CoV2 PCR (COVID-19) Nose    (H10.9,  B96.89) Bacterial conjunctivitis of both eyes  Comment: Suspect secondary bacterial infection given amount and quality of eye discharge, R>L.  Erythromycin ointment 4x/day x5 days.  Plan: erythromycin (ROMYCIN) 5 MG/GM ophthalmic         ointment     BMI  Estimated body mass index is 35.27 kg/m  as calculated from the following:    Height as of 9/24/24: 1.676 m (5' 6\").    Weight as of this encounter: 99.1 kg (218 lb 8 oz).   Weight management plan: Patient was referred to their PCP to discuss a diet and exercise plan.    See Patient Instructions      Judy Raygoza is a 24 year old, presenting for the following health issues:  URI (Cough, sore throat, congestion, prior fever since Friday ), Eye Problem (Red right eye ), and Conjunctivitis    History of Present Illness       Reason for visit:  Migraines   She is taking medications regularly.       Acute Illness  Onset/Duration: Started feeling \"weird\" 9/25/24, but had no specific symptoms until her cough started 9/27/24. Fever of 102 9/28/24 and persisted until 9/30/24. 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 When she returned home, she had nausea with " emesis.   Worked from home Tuesday.  Symptoms:  Fever: YES  Chills/Sweats: YES  Headache (location?): YES  Sinus Pressure: YES  Conjunctivitis:  YES  Ear Pain: YES- and muffled hearing.  Rhinorrhea: YES  Congestion: YES  Sore Throat: YES  Cough: YES  Wheeze: No; underlying asthma, but hasn't had to use inhaler  Decreased Appetite: YES  Nausea: YES  Vomiting: YES  Diarrhea: No  Dysuria/Freq.: No  Dysuria or Hematuria: No  Fatigue/Achiness: YES  Sick/Strep Exposure: YES- Coworker had the flue last week. Another of her colleagues also had a child with illness and pneumonia, and that colleague is now ill  Therapies tried and outcome: Pedialyte, Robitussin DM, Nasocort, Mucinex, allergy medication, Dayquil, Nyquil, sinus rinse -- all not particularly helpful.    Home COVID tests were negative x2.  Flu vaccine was updated at the end of August at [facility].      Review of Systems  Constitutional, HEENT, cardiovascular, pulmonary, GI, , musculoskeletal, neuro, skin, endocrine and psych systems are negative, except as otherwise noted.      Objective    /72   Pulse (!) 124   Temp 98.9  F (37.2  C) (Temporal)   Wt 99.1 kg (218 lb 8 oz)   LMP 09/18/2024 (Exact Date)   SpO2 97%   BMI 35.27 kg/m    Body mass index is 35.27 kg/m .  Physical Exam  Vitals and nursing note reviewed.   Constitutional:       Appearance: She is ill-appearing. She is not toxic-appearing or diaphoretic.      Comments: Appears uncomfortable   HENT:      Head: Normocephalic and atraumatic.      Right Ear: External ear normal. A middle ear effusion is present. Tympanic membrane is bulging.      Left Ear: External ear normal. A middle ear effusion is present. Tympanic membrane is bulging.      Ears:      Comments: Erythematous ear canals bilaterally     Nose: Mucosal edema and congestion present.      Right Turbinates: Swollen.      Left Turbinates: Swollen.      Right Sinus: Frontal sinus tenderness present.      Left Sinus: Frontal sinus  tenderness present.      Comments: Clear nasal drainage bilaterally     Mouth/Throat:      Mouth: Mucous membranes are dry.      Pharynx: Posterior oropharyngeal erythema present.      Tonsils: 1+ on the right. 1+ on the left.   Eyes:      General: Lids are normal. Vision grossly intact.         Right eye: Discharge present.         Left eye: Discharge present.     Extraocular Movements: Extraocular movements intact.      Conjunctiva/sclera:      Right eye: Right conjunctiva is injected. Chemosis present.      Left eye: Left conjunctiva is injected. Chemosis present.      Pupils: Pupils are equal, round, and reactive to light.      Comments: Crusty, yellow discharge R>L   Cardiovascular:      Rate and Rhythm: Regular rhythm. Tachycardia present.      Pulses: Normal pulses.      Heart sounds: Normal heart sounds. No murmur heard.     No friction rub. No gallop.   Pulmonary:      Effort: Pulmonary effort is normal. No respiratory distress.      Breath sounds: Decreased breath sounds present. No wheezing, rhonchi or rales.   Musculoskeletal:         General: No swelling.   Skin:     General: Skin is warm and dry.   Neurological:      General: No focal deficit present.      Mental Status: She is alert and oriented to person, place, and time.   Psychiatric:         Mood and Affect: Mood normal.         Behavior: Behavior normal.         Thought Content: Thought content normal.         Judgment: Judgment normal.             Signed Electronically by: RAJESH Fountain CNP

## 2024-10-02 NOTE — PATIENT INSTRUCTIONS
Pinkeye: Care Instructions  Overview     Pinkeye is redness and swelling of the eye surface and the conjunctiva (the lining of the eyelid and the covering of the white part of the eye). Pinkeye is also called conjunctivitis. Pinkeye is often caused by infection with bacteria or a virus. Dry air, allergies, smoke, and chemicals are other common causes.  Pinkeye often gets better on its own in 7 to 10 days. Antibiotics only help if the pinkeye is caused by bacteria. Pinkeye caused by infection spreads easily. If an allergy or chemical is causing pinkeye, it will not go away unless you can avoid whatever is causing it.  Follow-up care is a key part of your treatment and safety. Be sure to make and go to all appointments, and call your doctor if you are having problems. It's also a good idea to know your test results and keep a list of the medicines you take.  How can you care for yourself at home?  Wash your hands often. Always wash them before and after you treat pinkeye or touch your eyes or face.  Use moist cotton or a clean, wet cloth to remove crust. Wipe from the inside corner of the eye to the outside. Use a clean part of the cloth for each wipe.  Put cold or warm wet cloths on your eye a few times a day if the eye hurts.  Do not wear contact lenses or eye makeup until the pinkeye is gone. Throw away any eye makeup you were using when you got pinkeye. Clean your contacts and storage case. If you wear disposable contacts, use a new pair when your eye has cleared and it is safe to wear contacts again.  If the doctor gave you antibiotic ointment or eyedrops, use them as directed. Use the medicine for as long as instructed, even if your eye starts looking better soon. Keep the bottle tip clean, and do not let it touch the eye area.  To put in eyedrops or ointment:  Tilt your head back, and pull your lower eyelid down with one finger.  Drop or squirt the medicine inside the lower lid.  Close your eye for 30 to 60  "seconds to let the drops or ointment move around.  Do not touch the ointment or dropper tip to your eyelashes or any other surface.  Do not share towels, pillows, or washcloths while you have pinkeye.  When should you call for help?   Call your doctor now or seek immediate medical care if:    You have pain in your eye, not just irritation on the surface.     You have a change in vision or loss of vision.     You have an increase in discharge from the eye.     Your eye has not started to improve or begins to get worse within 48 hours after you start using antibiotics.     Pinkeye lasts longer than 7 days.   Watch closely for changes in your health, and be sure to contact your doctor if you have any problems.  Where can you learn more?  Go to https://www.[employer].net/patiented  Enter Y392 in the search box to learn more about \"Pinkeye: Care Instructions.\"  Current as of: June 5, 2023               Content Version: 14.0    9027-5686 [employer].   Care instructions adapted under license by your healthcare professional. If you have questions about a medical condition or this instruction, always ask your healthcare professional. [employer] disclaims any warranty or liability for your use of this information.      "

## 2024-10-02 NOTE — LETTER
October 2, 2024      Idalmis Swann  [address]  [address]  [place] [place] [facility] [place] [address]        To Whom It May Concern:    Idalmis Swann was seen in our clinic. Please accommodate her need to work from home until Friday, 10/4/24. She may return to work in person on 10/4/2024.      Sincerely,        RAJESH Fountain CNP

## 2024-10-05 ENCOUNTER — HOSPITAL ENCOUNTER (OUTPATIENT)
Dept: GENERAL RADIOLOGY | Facility: HOSPITAL | Age: 24
Discharge: HOME | End: 2024-10-05
Attending: PHYSICIAN ASSISTANT | Admitting: PHYSICIAN ASSISTANT
Payer: COMMERCIAL

## 2024-10-05 ENCOUNTER — NURSE TRIAGE (OUTPATIENT)
Dept: NURSING | Facility: CLINIC | Age: 24
End: 2024-10-05
Payer: COMMERCIAL

## 2024-10-05 ENCOUNTER — OFFICE VISIT (OUTPATIENT)
Dept: FAMILY MEDICINE | Facility: CLINIC | Age: 24
End: 2024-10-05
Payer: COMMERCIAL

## 2024-10-05 VITALS
SYSTOLIC BLOOD PRESSURE: 133 MMHG | DIASTOLIC BLOOD PRESSURE: 92 MMHG | TEMPERATURE: 97.8 F | OXYGEN SATURATION: 99 % | HEART RATE: 96 BPM

## 2024-10-05 DIAGNOSIS — R05.1 ACUTE COUGH: ICD-10-CM

## 2024-10-05 DIAGNOSIS — J01.90 ACUTE BACTERIAL SINUSITIS: ICD-10-CM

## 2024-10-05 DIAGNOSIS — R05.1 ACUTE COUGH: Primary | ICD-10-CM

## 2024-10-05 DIAGNOSIS — B96.89 ACUTE BACTERIAL SINUSITIS: ICD-10-CM

## 2024-10-05 PROCEDURE — 71046 X-RAY EXAM CHEST 2 VIEWS: CPT

## 2024-10-05 PROCEDURE — 99213 OFFICE O/P EST LOW 20 MIN: CPT | Performed by: PHYSICIAN ASSISTANT

## 2024-10-05 RX ORDER — CEFDINIR 300 MG/1
300 CAPSULE ORAL DAILY
Qty: 7 CAPSULE | Refills: 0 | Status: SHIPPED | OUTPATIENT
Start: 2024-10-05 | End: 2024-10-12

## 2024-10-05 NOTE — PROGRESS NOTES
Assessment & Plan     Acute cough  Xray was negative   - XR Chest 2 Views; Future    Acute bacterial sinusitis  Discussed with patient symptoms are consistent with sinus infection. Symptoms have been persisting long enough where an antibiotic would be warranted. Side effects of medication discussed. In addition I recommend warm compress's over the sinus's, nasal sprays, and sinus rinses. OTC pain relievers as needed. If symptoms do not improve after completing antibiotic then please follow up in clinic or sooner if symptoms worsen   - cefdinir (OMNICEF) 300 MG capsule; Take 1 capsule (300 mg) by mouth daily for 7 days.                No follow-ups on file.    Subjective   Idalmis is a 24 year old, presenting for the following health issues:  Urgent Care (- 1 Week/- Was seen at [facility] and was being treated for Flu/- Was advised by provider to come back in if symptoms did not improve/- Nurse triage advised for her to stop her tamiflu and be seen by a provider)    HPI       Acute Illness  Acute illness concerns: URI  Onset/Duration: 1 week  Symptoms:  Fever: YES- last night and earlier last week  Chills/Sweats: YES  Headache (location?): No  Sinus Pressure: YES  Conjunctivitis:  YES - but was treated for pink eye   Ear Pain: YES: both  Rhinorrhea: YES  Congestion: YES  Sore Throat: YES  Cough: YES- on and off wet and dry  Wheeze: YES  Decreased Appetite: YES- not having taste or smell  Nausea: YES  Vomiting: YES- last week but nothing since  Diarrhea: No  Dysuria/Freq.: No  Dysuria or Hematuria: No  Fatigue/Achiness: YES  Sick/Strep Exposure: Yes - coworker   Therapies tried and outcome: albuterol, sinus rinse, musinex, dayquila and nyquil   Additional provider notes :   Was see on URGENT CARE 3 days ago for symptoms. She was diagnosis with flu but then her flu test was negative. She was advised to monitor symptoms. However, patient is struggling still with her URI symptoms. She is coughing, congested, body aches,  fevers.       Review of Systems  Constitutional, HEENT, cardiovascular, pulmonary, gi and gu systems are negative, except as otherwise noted.      Objective    BP (!) 133/92   Pulse 96   Temp 97.8  F (36.6  C) (Tympanic)   LMP 09/18/2024 (Exact Date)   SpO2 99%   There is no height or weight on file to calculate BMI.  Physical Exam  Constitutional:       Appearance: Normal appearance.   HENT:      Right Ear: Ear canal normal. A middle ear effusion is present.      Left Ear: Ear canal normal. A middle ear effusion is present.      Nose: Congestion and rhinorrhea present.      Right Sinus: Maxillary sinus tenderness and frontal sinus tenderness present.      Left Sinus: Maxillary sinus tenderness and frontal sinus tenderness present.      Mouth/Throat:      Mouth: Mucous membranes are moist.      Pharynx: Oropharynx is clear. No posterior oropharyngeal erythema.   Cardiovascular:      Rate and Rhythm: Normal rate and regular rhythm.   Pulmonary:      Effort: Pulmonary effort is normal.      Breath sounds: Examination of the right-lower field reveals decreased breath sounds. Decreased breath sounds present.   Lymphadenopathy:      Cervical: No cervical adenopathy.   Neurological:      Mental Status: She is alert.   Psychiatric:         Mood and Affect: Mood normal.         Behavior: Behavior normal.            Results for orders placed or performed during the hospital encounter of 10/05/24   XR Chest 2 Views     Status: None    Narrative    EXAM: XR CHEST 2 VIEWS  LOCATION: [facility]  DATE: 10/5/2024    INDICATION:  Acute cough  COMPARISON: 01/07/2024.      Impression    IMPRESSION: Negative chest.           Signed Electronically by: ANNIA Rice

## 2024-10-05 NOTE — TELEPHONE ENCOUNTER
Pt states she has been sick since last Saturday 9/28. Was seen in clinic and tests Neg for Flu, covid, RSV. Fever broke and now back again last night. Unable to sleep due to congestion, sinus pain. Coughing.  History of Asthma.  Symptoms not improving.      Triage to see HCP within 24 hours, care advice given.    Melinda Paris RN, BSN  10/5/2024 at 9:48 AM  [facility]        Reason for Disposition   [1] Continuous (nonstop) coughing interferes with work or school AND [2] no improvement using cough treatment per Care Advice    Additional Information   Negative: SEVERE difficulty breathing (e.g., struggling for each breath, speaks in single words)   Negative: Bluish (or gray) lips or face now   Negative: [1] Difficulty breathing AND [2] exposure to flames, smoke, or fumes   Negative: [1] Stridor AND [2] difficulty breathing   Negative: Sounds like a life-threatening emergency to the triager   Negative: [1] MODERATE difficulty breathing (e.g., speaks in phrases, SOB even at rest, pulse 100-120) AND [2] still present when not coughing   Negative: Chest pain  (Exception: MILD central chest pain, present only when coughing.)   Negative: Patient sounds very sick or weak to the triager   Negative: [1] MILD difficulty breathing (e.g., minimal/no SOB at rest, SOB with walking, pulse <100) AND [2] still present when not coughing   Negative: [1] Coughed up blood AND [2] > 1 tablespoon (15 ml)   (Exception: Blood-tinged sputum.)   Negative: Fever > 103 F (39.4 C)   Negative: [1] Fever > 101 F (38.3 C) AND [2] age > 60 years   Negative: [1] Fever > 100.0 F (37.8 C) AND [2] bedridden (e.g., CVA, chronic illness, recovering from surgery)   Negative: [1] Fever > 100.0 F (37.8 C) AND [2] diabetes mellitus or weak immune system (e.g., HIV positive, cancer chemo, splenectomy, organ transplant, chronic steroids)   Negative: Wheezing is present   Negative: SEVERE coughing spells (e.g., whooping sound after coughing, vomiting  after coughing)    Protocols used: Cough - Acute [id number]-[id number]

## 2024-10-05 NOTE — PATIENT INSTRUCTIONS

## 2024-10-22 ENCOUNTER — OFFICE VISIT (OUTPATIENT)
Dept: FAMILY MEDICINE | Facility: CLINIC | Age: 24
End: 2024-10-22
Payer: COMMERCIAL

## 2024-10-22 VITALS
HEIGHT: 66 IN | WEIGHT: 219 LBS | DIASTOLIC BLOOD PRESSURE: 64 MMHG | TEMPERATURE: 97.6 F | RESPIRATION RATE: 20 BRPM | HEART RATE: 92 BPM | OXYGEN SATURATION: 97 % | SYSTOLIC BLOOD PRESSURE: 128 MMHG | BODY MASS INDEX: 35.2 KG/M2

## 2024-10-22 DIAGNOSIS — B97.89 ACUTE VIRAL SINUSITIS: Primary | ICD-10-CM

## 2024-10-22 DIAGNOSIS — J45.20 MILD INTERMITTENT ASTHMA WITHOUT COMPLICATION: ICD-10-CM

## 2024-10-22 DIAGNOSIS — J01.90 ACUTE VIRAL SINUSITIS: Primary | ICD-10-CM

## 2024-10-22 DIAGNOSIS — R09.81 NASAL CONGESTION: ICD-10-CM

## 2024-10-22 DIAGNOSIS — R05.2 SUBACUTE COUGH: ICD-10-CM

## 2024-10-22 PROBLEM — N83.209 HEMORRHAGIC OVARIAN CYST: Status: RESOLVED | Noted: 2021-03-03 | Resolved: 2024-10-22

## 2024-10-22 PROBLEM — D50.8 OTHER IRON DEFICIENCY ANEMIA: Status: RESOLVED | Noted: 2024-02-07 | Resolved: 2024-10-22

## 2024-10-22 PROBLEM — U09.9 COVID-19 LONG HAULER MANIFESTING CHRONIC DECREASED MOBILITY AND ENDURANCE: Status: RESOLVED | Noted: 2022-12-12 | Resolved: 2024-10-22

## 2024-10-22 PROBLEM — Z74.09 COVID-19 LONG HAULER MANIFESTING CHRONIC DECREASED MOBILITY AND ENDURANCE: Status: RESOLVED | Noted: 2022-12-12 | Resolved: 2024-10-22

## 2024-10-22 PROBLEM — J45.909 REACTIVE AIRWAY DISEASE: Status: RESOLVED | Noted: 2022-09-07 | Resolved: 2024-10-22

## 2024-10-22 PROCEDURE — 99214 OFFICE O/P EST MOD 30 MIN: CPT | Performed by: FAMILY MEDICINE

## 2024-10-22 RX ORDER — AZELASTINE 1 MG/ML
1 SPRAY, METERED NASAL 2 TIMES DAILY
Qty: 30 ML | Refills: 0 | Status: SHIPPED | OUTPATIENT
Start: 2024-10-22 | End: 2024-10-29

## 2024-10-22 ASSESSMENT — PAIN SCALES - GENERAL: PAINLEVEL: NO PAIN (0)

## 2024-10-22 NOTE — PROGRESS NOTES
The below note was dictated using voice recognition. Although reviewed after completion, some word and grammatical error may remain .       Assessment & Plan     Acute viral sinusitis  Subacute cough  Mild intermittent asthma without complication  Nasal congestion  Seen today for follow-up from the urgent care.  Seen initially 10/2 for viral symptoms and given Tamiflu for suspected flu flu COVID RSV test later came back negative and stopped.  Also treated for presumptive bacterial conjunctivitis with erythromycin ointment.  Returned on 5 October to a different urgent care for worsening cough chest x-ray was negative lungs were clear diagnosed with sinusitis and given Omnicef for 7 days.  Returns today for persistent cough congestion and nose ears feeling full blowing out green sputum.  Has had no fever vitals are stable lungs are clear sinuses are non tender has boggy turbinates, no purulent discharge noted in nasal passages or posterior pharynx.  There is clear postnasal drainage Centor criteria does not indicate strep, has clear fluid behind the eardrums no sign of ear infection and lungs are clear with no wheezing or rhonchi.  Suspect viral sinusitis and overly adequately treated with an antibiotic and now with symptoms of congestion due to the drainage.  Currently further antibiotics are not needed.  Symptoms not suggestive of whooping cough and testing not done.  Encouraged to concentrate on symptom management.  Try the Nasacort she has at home 1 puff twice a day both sides directing towards the used to prevent nosebleed.  Prescribed Astelin no spray 1 spray twice a day for 7 days.  Continue the generic Zyrtec 10 mg daily.  Avoid Sudafed more than 3 days in a row as can cause rebound congestion  Sleep with head elevated.  Use the AirDuo she has at home twice a day.  Has refills on file encouraged to   Currently no conjunctivitis noted.  The crustiness in the morning is due to backflow of sinus drainage  to the nasolacrimal ducts.  Recommend warm compress artificial tears and treating the sinus drainage will help with the eyes.  Symptoms may last 6 weeks after infection treated.  If having fever chest pain shortness of breath getting worse then to contact us may benefit then from further imaging CT sinuses referral to ENT but reassured that most people will recover with time.  - azelastine (ASTELIN) 0.1 % nasal spray; Spray 1 spray into both nostrils 2 times daily for 7 days.    See Patient Instructions    Judy Raygoza is a 24 year old, presenting for the following health issues:  office visit (Was seen at [facility] and given Tamaflu, did not end up having the flu so she stopped the medication. Having very productive cough, trouble hearing out of R ear, L ear is getting bad as well. Has been taking OTC cold flu meds. Just can't seem to get rid of the symptoms. )        10/22/2024     8:30 AM   Additional Questions   Roomed by Sharlene TORO   Accompanied by self         10/22/2024     8:30 AM   Patient Reported Additional Medications   Patient reports taking the following new medications None     History of Present Illness       Reason for visit:  Follow up from sick visit    She eats 2-3 servings of fruits and vegetables daily.She consumes 0 sweetened beverage(s) daily.She exercises with enough effort to increase her heart rate 30 to 60 minutes per day.  She exercises with enough effort to increase her heart rate 4 days per week.   She is taking medications regularly.     Hx of obesity, dyslipidemia, thyromegaly history of asthma seasonal allergies on albuterol as needed and Airduo, history of migraines on Topamax, cervicogenic pain, lumbar spondylosis, knee chondromalacia, right ganglion cyst, de Quervain's tenosynovitis on the right, history of insomnia,resolved iron deficiency anemia, prior history of endometriosis, history of hemorrhagic ovarian cyst, lab diagnostic study for that, history of post-COVID  syndrome, history of nut allergy, EpiPen as needed,  prior tonsil and adenoidectomy, history of bilateral nipple discharge, history of prior breast biopsy, multiple allergies Vitex, Bactrim, sulfa, penicillin, tree nuts, mold, dog dander, latex excetra under  care of PCP antoni    Seen 10/2/2420 days ago for fever and congestion and cough given Tamiflu given erythromycin for diagnosis of conjunctivitis both eyes.  Flu RSV COVID test came back negative and stop the medication.  Sen next 10/ 5 for persistent symptoms chest x-ray was negative and given an antibiotic Omnicef for 7 days completed likely on 12 October    Today to follow-up.  Reporting persistent cough congestion and ear soreness group and blowing out green sputum.  New to this provider     Reports had flu beginig of month  Then seen 3 days later told sinus infection  Negative cxr  COVID flu Rsc negative  Complted omnicef     Feel not getting better  Ear pain, throat hurts  Not able to get mucus out  Taking sudafed, Mucinex, allergymed, Tyneol sinus    No fever or chills, no headache or dizziness, no double or blurry vision, no facial pain, has earache, , if lay on side may feel fullness ringing, throat hurts, not a runny nose, congested nose, blowing out sometimes, deviated septum, feels harder to get stuff out, can do sinus rinse, not always comes out other side, sometimes has post nasal drip,better,  no trouble hearing, or swallowing, no sense of taste or smell yet, cough more when lay down, occ stuff in cough, no blood, mostly green, has airduo, not used , new pcp in feb 2025, no chest pain, trouble breathing or palpitations, No abdominal pain, heart burn, reflux, nausea or vomiting or diarrhea or constipation, no blood in stools or black stools, no weight loss or night sweats. No dysuria, hematuria, frequency, urgency, hesitancy, incontinence, No pelvic complaints. No leg swelling or joint pain. No rash.      Review of Systems  Constitutional, HEENT,  "cardiovascular, pulmonary, GI, , musculoskeletal, neuro, skin, endocrine and psych systems are negative, except as otherwise noted.      Objective    /64 (BP Location: Right arm, Patient Position: Sitting, Cuff Size: Adult Regular)   Pulse 92   Temp 97.6  F (36.4  C) (Temporal)   Resp 20   Ht 1.676 m (5' 6\")   Wt 99.3 kg (219 lb)   LMP 10/13/2024 (Exact Date)   SpO2 97%   BMI 35.35 kg/m    Body mass index is 35.35 kg/m .  Physical Exam   GENERAL: alert and no distress  EYES: Eyes grossly normal to inspection, PERRL and conjunctivae and sclerae normal  HENT: normal cephalic/atraumatic, both ears: clear effusion, nose and mouth without ulcers or lesions, nasal mucosa edematous , oropharynx clear, oral mucous membranes moist, and sinuses: not tender  NECK: no adenopathy, no asymmetry, masses, or scars  RESP: lungs clear to auscultation - no rales, rhonchi or wheezes  CV: regular rate and rhythm, normal S1 S2, no S3 or S4, no murmur, click or rub, no peripheral edema  ABDOMEN: soft, non tender, no hepatosplenomegaly, no masses and bowel sounds normal  MS: no gross musculoskeletal defects noted, no edema  SKIN: no suspicious lesions or rashes  NEURO: Normal strength and tone, mentation intact and speech normal  PSYCH: mentation appears normal, affect normal/bright    No results found for any visits on 10/22/24.  No results found for this or any previous visit (from the past 24 hour(s)).        Signed Electronically by: Nataly Kumar MD  "

## 2024-12-08 ASSESSMENT — ASTHMA QUESTIONNAIRES
EMERGENCY_ROOM_LAST_YEAR_TOTAL: ONE
QUESTION_1 LAST FOUR WEEKS HOW MUCH OF THE TIME DID YOUR ASTHMA KEEP YOU FROM GETTING AS MUCH DONE AT WORK, SCHOOL OR AT HOME: NONE OF THE TIME
ACT_TOTALSCORE: 24
QUESTION_2 LAST FOUR WEEKS HOW OFTEN HAVE YOU HAD SHORTNESS OF BREATH: NOT AT ALL
QUESTION_4 LAST FOUR WEEKS HOW OFTEN HAVE YOU USED YOUR RESCUE INHALER OR NEBULIZER MEDICATION (SUCH AS ALBUTEROL): NOT AT ALL
ACT_TOTALSCORE: 24
QUESTION_5 LAST FOUR WEEKS HOW WOULD YOU RATE YOUR ASTHMA CONTROL: WELL CONTROLLED
QUESTION_3 LAST FOUR WEEKS HOW OFTEN DID YOUR ASTHMA SYMPTOMS (WHEEZING, COUGHING, SHORTNESS OF BREATH, CHEST TIGHTNESS OR PAIN) WAKE YOU UP AT NIGHT OR EARLIER THAN USUAL IN THE MORNING: NOT AT ALL

## 2024-12-09 ENCOUNTER — OFFICE VISIT (OUTPATIENT)
Dept: INTERNAL MEDICINE | Facility: CLINIC | Age: 24
End: 2024-12-09
Payer: COMMERCIAL

## 2024-12-09 VITALS
SYSTOLIC BLOOD PRESSURE: 126 MMHG | HEART RATE: 83 BPM | TEMPERATURE: 97.6 F | HEIGHT: 66 IN | WEIGHT: 219 LBS | BODY MASS INDEX: 35.2 KG/M2 | DIASTOLIC BLOOD PRESSURE: 81 MMHG | RESPIRATION RATE: 20 BRPM | OXYGEN SATURATION: 97 %

## 2024-12-09 DIAGNOSIS — R19.7 DIARRHEA, UNSPECIFIED TYPE: Primary | ICD-10-CM

## 2024-12-09 DIAGNOSIS — R11.0 NAUSEA: ICD-10-CM

## 2024-12-09 LAB
ALBUMIN SERPL BCG-MCNC: 4.4 G/DL (ref 3.5–5.2)
ALP SERPL-CCNC: 89 U/L (ref 40–150)
ALT SERPL W P-5'-P-CCNC: 61 U/L (ref 0–50)
ANION GAP SERPL CALCULATED.3IONS-SCNC: 8 MMOL/L (ref 7–15)
AST SERPL W P-5'-P-CCNC: 32 U/L (ref 0–45)
BILIRUB SERPL-MCNC: 0.3 MG/DL
BUN SERPL-MCNC: 9.7 MG/DL (ref 6–20)
CALCIUM SERPL-MCNC: 9.2 MG/DL (ref 8.8–10.4)
CHLORIDE SERPL-SCNC: 105 MMOL/L (ref 98–107)
CREAT SERPL-MCNC: 0.75 MG/DL (ref 0.51–0.95)
EGFRCR SERPLBLD CKD-EPI 2021: >90 ML/MIN/1.73M2
GLUCOSE SERPL-MCNC: 89 MG/DL (ref 70–99)
HCO3 SERPL-SCNC: 26 MMOL/L (ref 22–29)
POTASSIUM SERPL-SCNC: 4.4 MMOL/L (ref 3.4–5.3)
PROT SERPL-MCNC: 7.2 G/DL (ref 6.4–8.3)
SODIUM SERPL-SCNC: 139 MMOL/L (ref 135–145)

## 2024-12-09 PROCEDURE — 36415 COLL VENOUS BLD VENIPUNCTURE: CPT | Performed by: PHYSICIAN ASSISTANT

## 2024-12-09 PROCEDURE — 99213 OFFICE O/P EST LOW 20 MIN: CPT | Performed by: PHYSICIAN ASSISTANT

## 2024-12-09 PROCEDURE — 80053 COMPREHEN METABOLIC PANEL: CPT | Performed by: PHYSICIAN ASSISTANT

## 2024-12-09 RX ORDER — ONDANSETRON 4 MG/1
4 TABLET, ORALLY DISINTEGRATING ORAL EVERY 8 HOURS PRN
Qty: 30 TABLET | Refills: 1 | Status: SHIPPED | OUTPATIENT
Start: 2024-12-09

## 2024-12-09 ASSESSMENT — ENCOUNTER SYMPTOMS: DIARRHEA: 1

## 2024-12-09 ASSESSMENT — PAIN SCALES - GENERAL: PAINLEVEL_OUTOF10: NO PAIN (0)

## 2024-12-09 NOTE — PROGRESS NOTES
Assessment & Plan     Diarrhea, unspecified type    - C. difficile Toxin B PCR with reflex to C. difficile Antigen and Toxins A/B EIA; Future  - Comprehensive metabolic panel (BMP + Alb, Alk Phos, ALT, AST, Total. Bili, TP); Future  - Enteric Bacteria and Virus Panel by JEOVANY Stool; Future  - C. difficile Toxin B PCR with reflex to C. difficile Antigen and Toxins A/B EIA  - Comprehensive metabolic panel (BMP + Alb, Alk Phos, ALT, AST, Total. Bili, TP)  - Enteric Bacteria and Virus Panel by JEOVANY Stool    Nausea    - ondansetron (ZOFRAN ODT) 4 MG ODT tab; Take 1 tablet (4 mg) by mouth every 8 hours as needed for nausea.      Will review results via my chart .  Dicussed OTC for diarrhea once stool sample collected,             Judy Raygoza is a 24 year old, presenting for the following health issues:  Diarrhea      12/9/2024     7:07 AM   Additional Questions   Roomed by Tish SANCHEZ MA     Diarrhea    History of Present Illness       Reason for visit:  Diarrhea for over a week now  Symptom onset:  1-2 weeks ago  Symptoms include:  Darkened urine even though im drinking plenty of water, diarrhea more than 5 times a day. Unable to keep water/food in without immediate diarrhea. Stomach pain  Symptom intensity:  Moderate  Symptom progression:  Worsening  Had these symptoms before:  No  What makes it worse:  No  What makes it better:  No   She is taking medications regularly.   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  Physical Exam   GENERAL: alert and no distress  RESP: lungs clear to auscultation - no rales, rhonchi or wheezes  CV: regular rate and rhythm, normal S1 S2, no S3 or S4, no murmur, click or rub, no peripheral edema  ABDOMEN: bowel sounds normal and mild discomfort but no focal tenderness or rebound   MS: no gross musculoskeletal defects noted, no edema    Results for orders placed or performed in visit on 12/09/24 (from the past 24 hours)   Comprehensive metabolic panel (BMP + Alb, Alk Phos, ALT, AST, Total. Bili, TP)   Result Value Ref Range    Sodium 139 135 - 145 mmol/L    Potassium 4.4 3.4 - 5.3 mmol/L    Carbon Dioxide (CO2) 26 22 - 29 mmol/L    Anion Gap 8 7 - 15 mmol/L    Urea Nitrogen 9.7 6.0 - 20.0 mg/dL    Creatinine 0.75 0.51 - 0.95 mg/dL    GFR Estimate >90 >60 mL/min/1.73m2    Calcium 9.2 8.8 - 10.4 mg/dL    Chloride 105 98 - 107 mmol/L    Glucose 89 70 - 99 mg/dL    Alkaline Phosphatase 89 40 - 150 U/L    AST 32 0 - 45 U/L    ALT 61 (H) 0 - 50 U/L    Protein Total 7.2 6.4 - 8.3 g/dL    Albumin 4.4 3.5 - 5.2 g/dL    Bilirubin Total 0.3 <=1.2 mg/dL           Signed Electronically by: Samira Gonzalez PA-C    "

## 2024-12-10 LAB

## 2024-12-10 PROCEDURE — 87493 C DIFF AMPLIFIED PROBE: CPT | Performed by: PHYSICIAN ASSISTANT

## 2024-12-10 PROCEDURE — 87507 IADNA-DNA/RNA PROBE TQ 12-25: CPT | Mod: 59 | Performed by: PHYSICIAN ASSISTANT

## 2025-01-02 NOTE — TELEPHONE ENCOUNTER
DIAGNOSIS: R wrist follow up    APPOINTMENT DATE: 1/9/25   NOTES STATUS DETAILS   OFFICE NOTE from referring provider Internal Self    OFFICE NOTE from other specialist Internal 7/9/24 Angus Sigala, NANDO    MEDICATION LIST Internal

## 2025-01-08 NOTE — PROGRESS NOTES
[facility]  Sports Medicine Clinic  Clinics and Surgery Center           SUBJECTIVE       Idalmis Swann is a 24 year old female presenting to clinic today for a follow-up of the right wrist.  For follow-up of the right wrist.  She is overall doing very well today, but does intermittently notice pain in the dorsum of the wrist secondary to the area where the ganglion cyst is.  She also has some pain in the region of the first dorsal compartment.  She is not having any numbness or tingling.    5/14/24: De Quervain's tenosynovitis, right  -     Hand Therapy Referral; Future     Ganglion of right wrist  -     Hand Therapy Referral; Future        23-year-old female presenting to clinic today with much improved de Quervain's tenosynovitis of the right hand, and improved ganglion cyst of the right wrist.  I have discussed options for management with the patient including observation, aspiration, surgical removal, as well as hand therapy.  At this point the patient is not interested in an aspiration, or surgical removal.  At this point given the fact that it is resolving I do think this is beneficial.  I am fine with her starting hand therapy as she has requested it, although I do believe that her first dorsal compartment tenosynovitis is improving, and I do not think that a hand therapy structure of a ganglion cyst.  Wppointment will change the overall patient still wants to start this, order has been placed.  At this point she can monitor.,  If it is bothering her getting larger, she should return to clinic at which point we can schedule her for an ultrasound-guided aspiration of the cyst.  All questions answered.  Return precautions advised.       PMH, Medications and Allergies were reviewed and updated as needed.    ROS:  As noted above otherwise negative.    Patient Active Problem List   Diagnosis    Endometriosis    Nut allergy    Intractable migraine with aura without status migrainosus    Mixed  dyslipidemia    Thyromegaly    Seasonal allergic rhinitis    Chronic post-COVID-19 syndrome    Chondromalacia patellae, left knee    Discogenic cervical pain    Other insomnia    Spondylolysis of lumbosacral region    Class 2 severe obesity due to excess calories with serious comorbidity in adult (H)    De Quervain's tenosynovitis, right    Ganglion of right wrist    Bilateral nipple discharge    Moderate persistent asthma without complication       Current Outpatient Medications   Medication Sig Dispense Refill    albuterol (PROAIR HFA/PROVENTIL HFA/VENTOLIN HFA) 108 (90 Base) MCG/ACT inhaler Inhale 2 puffs into the lungs every 6 hours as needed for shortness of breath, wheezing or cough 18 g 0    albuterol (PROVENTIL) (2.5 MG/3ML) 0.083% neb solution TAKE 3 ML (2.5 MG) EVERY 6 HOURS AS NEEDED BY NEBULIZATION FOR WHEEZING FOR UP TO 14 DAYS      EPINEPHrine (ANY BX GENERIC EQUIV) 0.3 MG/0.3ML injection 2-pack Inject 0.3 mLs (0.3 mg) into the muscle as needed for anaphylaxis 2 each 1    fluticasone-salmeterol (AIRDUO RESPICLICK) 232-14 MCG/ACT inhaler Inhale 1 puff into the lungs 2 times daily 1 each 3    ondansetron (ZOFRAN ODT) 4 MG ODT tab Take 1 tablet (4 mg) by mouth every 8 hours as needed for nausea. 30 tablet 1    topiramate ER (QUDEXY XR) 25 MG 24 hr capsule Take 1 capsule (25 mg) by mouth daily. 90 capsule 3            OBJECTIVE:       Vitals: There were no vitals filed for this visit.  BMI: There is no height or weight on file to calculate BMI.    Gen:  Well nourished and in no acute distress  HEENT: Extraocular movement intact  Neck: Supple  Pulm:  Breathing Comfortably. No increased respiratory effort.  Psych: Euthymic. Appropriately answers questions    MSK: Right wrist without evidence of erythema or ecchymosis.  Very small, less than half centimeter ovoid nodule in the distal radial ulnar region, without overlying warmth or erythema.  Firm and mobile.  Tenderness to palpation also in the first dorsal  compartment worse with ulnar deviation and resisted radial deviation.  Positive Finkelstein testing.          ASSESSMENT and PLAN:     Idalmis was seen today for follow up.    Diagnoses and all orders for this visit:    De Quervain's tenosynovitis, right  -     MR Wrist Right w/o Contrast; Future  -     Sports [facility] Adult Follow-Up Clinic Order (Blank)    Ganglion of right wrist  -     Orthopedic  Referral; Future  -     Sports [facility] Adult Follow-Up Clinic Order (Blank)    Wrist pain, chronic, right  -     MR Wrist Right w/o Contrast; Future  -     Sports [facility] Adult Follow-Up Clinic Order (Blank)      Idalmis is a 24-year-old female presenting to clinic today for follow-up of the right wrist.  Clinically, the patient has evidence of what appears to be a ganglion cyst.  She also has evidence of de Quervain's tenosynovitis and has done hand therapy.    Plan:  We have had a long conversation in regards to the above diagnoses.  The patient however would really like the system removed, I do think that is reasonable given the fact that when we have seen her she has not had a size  of the lesion/cyst that would be amenable to an aspiration.  We have discussed ultrasound-guided aspiration, but the patient would like to proceed with removal given the undulating pattern.  I have ordered an MRI for further evaluation.  For the first dorsal compartment, I also think the patient would benefit greatly from an ultrasound-guided first dorsal compartment injection.  This has been scheduled, and we will see her on Thursday the 16th for this.  I think the patient may need a brief course of hand therapy again, but we will be discussed at that visit depending on how she is doing after the injection.  All of her questions have been answered.  Return precautions have been advised.  Referral to orthopedic surgery/hand has been provided given the patient's request.  All questions answered.  Return precautions advised.    Options for  treatment and/or follow-up care were reviewed with the patient was actively involved in the decision making process. Patient verbalized understanding and was in agreement with the plan.    Ruy Sewell DO  , Sports Medicine  Department of Family Medicine and [facility]

## 2025-01-09 ENCOUNTER — PRE VISIT (OUTPATIENT)
Dept: ORTHOPEDICS | Facility: CLINIC | Age: 25
End: 2025-01-09

## 2025-01-09 ENCOUNTER — OFFICE VISIT (OUTPATIENT)
Dept: ORTHOPEDICS | Facility: CLINIC | Age: 25
End: 2025-01-09
Payer: COMMERCIAL

## 2025-01-09 DIAGNOSIS — M65.4 DE QUERVAIN'S TENOSYNOVITIS, RIGHT: Primary | ICD-10-CM

## 2025-01-09 DIAGNOSIS — M25.531 WRIST PAIN, CHRONIC, RIGHT: ICD-10-CM

## 2025-01-09 DIAGNOSIS — M67.431 GANGLION OF RIGHT WRIST: ICD-10-CM

## 2025-01-09 DIAGNOSIS — G89.29 WRIST PAIN, CHRONIC, RIGHT: ICD-10-CM

## 2025-01-09 NOTE — LETTER
1/9/2025      RE: Idalmis Swann  [address]  [place] [address]     Dear Colleague,    Thank you for referring your patient, Idalmis Swann, to the [facility] [place]. Please see a copy of my visit note below.    [facility]  Sports Medicine Clinic  Clinics and Surgery Center           SUBJECTIVE       Idalmis Swann is a 24 year old female presenting to clinic today for a follow-up of the right wrist.  For follow-up of the right wrist.  She is overall doing very well today, but does intermittently notice pain in the dorsum of the wrist secondary to the area where the ganglion cyst is.  She also has some pain in the region of the first dorsal compartment.  She is not having any numbness or tingling.    5/14/24: De Quervain's tenosynovitis, right  -     Hand Therapy Referral; Future     Ganglion of right wrist  -     Hand Therapy Referral; Future        23-year-old female presenting to clinic today with much improved de Quervain's tenosynovitis of the right hand, and improved ganglion cyst of the right wrist.  I have discussed options for management with the patient including observation, aspiration, surgical removal, as well as hand therapy.  At this point the patient is not interested in an aspiration, or surgical removal.  At this point given the fact that it is resolving I do think this is beneficial.  I am fine with her starting hand therapy as she has requested it, although I do believe that her first dorsal compartment tenosynovitis is improving, and I do not think that a hand therapy structure of a ganglion cyst.  Wppointment will change the overall patient still wants to start this, order has been placed.  At this point she can monitor.,  If it is bothering her getting larger, she should return to clinic at which point we can schedule her for an ultrasound-guided aspiration of the cyst.  All questions answered.  Return precautions  advised.       PMH, Medications and Allergies were reviewed and updated as needed.    ROS:  As noted above otherwise negative.    Patient Active Problem List   Diagnosis     Endometriosis     Nut allergy     Intractable migraine with aura without status migrainosus     Mixed dyslipidemia     Thyromegaly     Seasonal allergic rhinitis     Chronic post-COVID-19 syndrome     Chondromalacia patellae, left knee     Discogenic cervical pain     Other insomnia     Spondylolysis of lumbosacral region     Class 2 severe obesity due to excess calories with serious comorbidity in adult (H)     De Quervain's tenosynovitis, right     Ganglion of right wrist     Bilateral nipple discharge     Moderate persistent asthma without complication       Current Outpatient Medications   Medication Sig Dispense Refill     albuterol (PROAIR HFA/PROVENTIL HFA/VENTOLIN HFA) 108 (90 Base) MCG/ACT inhaler Inhale 2 puffs into the lungs every 6 hours as needed for shortness of breath, wheezing or cough 18 g 0     albuterol (PROVENTIL) (2.5 MG/3ML) 0.083% neb solution TAKE 3 ML (2.5 MG) EVERY 6 HOURS AS NEEDED BY NEBULIZATION FOR WHEEZING FOR UP TO 14 DAYS       EPINEPHrine (ANY BX GENERIC EQUIV) 0.3 MG/0.3ML injection 2-pack Inject 0.3 mLs (0.3 mg) into the muscle as needed for anaphylaxis 2 each 1     fluticasone-salmeterol (AIRDUO RESPICLICK) 232-14 MCG/ACT inhaler Inhale 1 puff into the lungs 2 times daily 1 each 3     ondansetron (ZOFRAN ODT) 4 MG ODT tab Take 1 tablet (4 mg) by mouth every 8 hours as needed for nausea. 30 tablet 1     topiramate ER (QUDEXY XR) 25 MG 24 hr capsule Take 1 capsule (25 mg) by mouth daily. 90 capsule 3            OBJECTIVE:       Vitals: There were no vitals filed for this visit.  BMI: There is no height or weight on file to calculate BMI.    Gen:  Well nourished and in no acute distress  HEENT: Extraocular movement intact  Neck: Supple  Pulm:  Breathing Comfortably. No increased respiratory effort.  Psych:  Euthymic. Appropriately answers questions    MSK: Right wrist without evidence of erythema or ecchymosis.  Very small, less than half centimeter ovoid nodule in the distal radial ulnar region, without overlying warmth or erythema.  Firm and mobile.  Tenderness to palpation also in the first dorsal compartment worse with ulnar deviation and resisted radial deviation.  Positive Finkelstein testing.          ASSESSMENT and PLAN:     Idalmis was seen today for follow up.    Diagnoses and all orders for this visit:    De Quervain's tenosynovitis, right  -     MR Wrist Right w/o Contrast; Future  -     Sports Med Adult Follow-Up Clinic Order (Blank)    Ganglion of right wrist  -     Orthopedic  Referral; Future  -     Sports Med Adult Follow-Up Clinic Order (Blank)    Wrist pain, chronic, right  -     MR Wrist Right w/o Contrast; Future  -     Sports Med Adult Follow-Up Clinic Order (Blank)      Idalmis is a 24-year-old female presenting to clinic today for follow-up of the right wrist.  Clinically, the patient has evidence of what appears to be a ganglion cyst.  She also has evidence of de Quervain's tenosynovitis and has done hand therapy.    Plan:  We have had a long conversation in regards to the above diagnoses.  The patient however would really like the system removed, I do think that is reasonable given the fact that when we have seen her she has not had a size  of the lesion/cyst that would be amenable to an aspiration.  We have discussed ultrasound-guided aspiration, but the patient would like to proceed with removal given the undulating pattern.  I have ordered an MRI for further evaluation.  For the first dorsal compartment, I also think the patient would benefit greatly from an ultrasound-guided first dorsal compartment injection.  This has been scheduled, and we will see her on Thursday the 16th for this.  I think the patient may need a brief course of hand therapy again, but we will be discussed at  that visit depending on how she is doing after the injection.  All of her questions have been answered.  Return precautions have been advised.  Referral to orthopedic surgery/hand has been provided given the patient's request.  All questions answered.  Return precautions advised.    Options for treatment and/or follow-up care were reviewed with the patient was actively involved in the decision making process. Patient verbalized understanding and was in agreement with the plan.    Ruy Sewell DO  , Sports Medicine  Department of Family [facility] and [facility]      Again, thank you for allowing me to participate in the care of your patient.      Sincerely,    Ruy Sewell DO

## 2025-01-15 NOTE — PROGRESS NOTES
PROCEDURE ENCOUNTER    [facility]  Ruy Sewell, DO  January 15, 2025     Name: Idalmis Swann  MRN: [id number]  Age: 24 year old  : 2000    Referring provider:   Diagnosis: R Wrist DeQuervains  Idalmis is a 24-year-old female presenting to clinic today for follow-up of the right wrist.  Clinically, the patient has evidence of what appears to be a ganglion cyst.  She also has evidence of de Quervain's tenosynovitis and has done hand therapy.     Plan:  We have had a long conversation in regards to the above diagnoses.  The patient however would really like the system removed, I do think that is reasonable given the fact that when we have seen her she has not had a size  of the lesion/cyst that would be amenable to an aspiration.  We have discussed ultrasound-guided aspiration, but the patient would like to proceed with removal given the undulating pattern.  I have ordered an MRI for further evaluation.  For the first dorsal compartment, I also think the patient would benefit greatly from an ultrasound-guided first dorsal compartment injection.  This has been scheduled, and we will see her on Thursday the  for this.  I think the patient may need a brief course of hand therapy again, but we will be discussed at that visit depending on how she is doing after the injection.  All of her questions have been answered.  Return precautions have been advised.  Referral to orthopedic surgery/hand has been provided given the patient's request.  All questions answered.  Return precautions advised.       Procedure: First Dorsal Compartment Injection - Ultrasound Guided  The patient was informed of the risks and the benefits of the procedure and a written consent was signed.  The patient s right wrist was prepped with chlorhexidine in sterile fashion.   20 mg of triamcinolone suspension was drawn up into a 1 mL syringe with 0.5 mL of 1% lidocaine.  Injection was performed using sterile technique.  Under  "ultrasound guidance a 1.5\" 25-gauge needle was used to enter the right wrist at the dorsal first compartment.  Needle placement was visualized and documented with ultrasound.  Ultrasound visualization required to ensure injection material enters the tendon sheath and not the tendon itself.  Injection performed long axis to the probe.  Injection solution visualized within the tendon sheath.  Images were permanently stored for the patient's record.  There were no complications. The patient tolerated the procedure well. There was negligible bleeding.   The patient was instructed to ice the wrist upon leaving clinic and refrain from overuse over the next 3 days.   The patient was instructed to call or go to the emergency room with any unusual pain, swelling, redness, or if otherwise concerned.  Ruy Sewell D.O., [facility]  , Sports Medicine  Department of Family Medicine and [facility]      "

## 2025-01-16 ENCOUNTER — OFFICE VISIT (OUTPATIENT)
Dept: ORTHOPEDICS | Facility: CLINIC | Age: 25
End: 2025-01-16
Payer: COMMERCIAL

## 2025-01-16 DIAGNOSIS — M65.4 DE QUERVAIN'S TENOSYNOVITIS, RIGHT: Primary | ICD-10-CM

## 2025-01-16 RX ORDER — TRIAMCINOLONE ACETONIDE 40 MG/ML
20 INJECTION, SUSPENSION INTRA-ARTICULAR; INTRAMUSCULAR
Status: COMPLETED | OUTPATIENT
Start: 2025-01-16 | End: 2025-01-16

## 2025-01-16 RX ORDER — LIDOCAINE HYDROCHLORIDE 10 MG/ML
0.5 INJECTION, SOLUTION EPIDURAL; INFILTRATION; INTRACAUDAL; PERINEURAL
Status: COMPLETED | OUTPATIENT
Start: 2025-01-16 | End: 2025-01-16

## 2025-01-16 RX ADMIN — TRIAMCINOLONE ACETONIDE 20 MG: 40 INJECTION, SUSPENSION INTRA-ARTICULAR; INTRAMUSCULAR at 09:51

## 2025-01-16 RX ADMIN — LIDOCAINE HYDROCHLORIDE 0.5 ML: 10 INJECTION, SOLUTION EPIDURAL; INFILTRATION; INTRACAUDAL; PERINEURAL at 09:51

## 2025-01-16 NOTE — LETTER
2025      RE: Idalmis Swann   [address]  [facility] [address]     Dear Colleague,    Thank you for referring your patient, Idalmis Swann, to the [facility] [place]. Please see a copy of my visit note below.    PROCEDURE ENCOUNTER    [facility]  Geosapna SANCHEZ Dnoato, DO  January 15, 2025     Name: Idalmis Swann  MRN: [id number]  Age: 24 year old  : 2000    Referring provider:   Diagnosis: R Wrist DeQuervains  Idalmis is a 24-year-old female presenting to clinic today for follow-up of the right wrist.  Clinically, the patient has evidence of what appears to be a ganglion cyst.  She also has evidence of de Quervain's tenosynovitis and has done hand therapy.     Plan:  We have had a long conversation in regards to the above diagnoses.  The patient however would really like the system removed, I do think that is reasonable given the fact that when we have seen her she has not had a size  of the lesion/cyst that would be amenable to an aspiration.  We have discussed ultrasound-guided aspiration, but the patient would like to proceed with removal given the undulating pattern.  I have ordered an MRI for further evaluation.  For the first dorsal compartment, I also think the patient would benefit greatly from an ultrasound-guided first dorsal compartment injection.  This has been scheduled, and we will see her on Thursday the  for this.  I think the patient may need a brief course of hand therapy again, but we will be discussed at that visit depending on how she is doing after the injection.  All of her questions have been answered.  Return precautions have been advised.  Referral to orthopedic surgery/hand has been provided given the patient's request.  All questions answered.  Return precautions advised.       Procedure: First Dorsal Compartment Injection - Ultrasound Guided  The patient was informed of the risks and the benefits of the  "procedure and a written consent was signed.  The patient s right wrist was prepped with chlorhexidine in sterile fashion.   20 mg of triamcinolone suspension was drawn up into a 1 mL syringe with 0.5 mL of 1% lidocaine.  Injection was performed using sterile technique.  Under ultrasound guidance a 1.5\" 25-gauge needle was used to enter the right wrist at the dorsal first compartment.  Needle placement was visualized and documented with ultrasound.  Ultrasound visualization required to ensure injection material enters the tendon sheath and not the tendon itself.  Injection performed long axis to the probe.  Injection solution visualized within the tendon sheath.  Images were permanently stored for the patient's record.  There were no complications. The patient tolerated the procedure well. There was negligible bleeding.   The patient was instructed to ice the wrist upon leaving clinic and refrain from overuse over the next 3 days.   The patient was instructed to call or go to the emergency room with any unusual pain, swelling, redness, or if otherwise concerned.  Ruy Sewell D.O., [facility]  , Sports Medicine  Department of Family Medicine and [facility]        Hand / Upper Extremity Injection: R extensor compartment 1    Date/Time: 1/16/2025 9:51 AM    Performed by: Ruy Sewell DO  Authorized by: Ruy Sewell DO    Indications:  Pain and tendon swelling  Needle Size:  25 G  Guidance: ultrasound    Approach:  Lateral  Condition: de Quervain's      Site:  R extensor compartment 1  Medications:  20 mg triamcinolone 40 MG/ML; 0.5 mL lidocaine (PF) 1 %  Outcome:  Tolerated well, no immediate complications  Procedure discussed: discussed risks, benefits, and alternatives    Consent Given by:  Patient  Timeout: timeout called immediately prior to procedure    Prep: patient was prepped and draped in usual sterile fashion         Simeon Farias M.A., LAT, " ATC  Certified Athletic Trainer            Again, thank you for allowing me to participate in the care of your patient.      Sincerely,    Ruy Sewell, DO

## 2025-01-16 NOTE — PROGRESS NOTES
Hand / Upper Extremity Injection: R extensor compartment 1    Date/Time: 1/16/2025 9:51 AM    Performed by: Ruy Sewell DO  Authorized by: Ruy Sewell DO    Indications:  Pain and tendon swelling  Needle Size:  25 G  Guidance: ultrasound    Approach:  Lateral  Condition: de Quervain's      Site:  R extensor compartment 1  Medications:  20 mg triamcinolone 40 MG/ML; 0.5 mL lidocaine (PF) 1 %  Outcome:  Tolerated well, no immediate complications  Procedure discussed: discussed risks, benefits, and alternatives    Consent Given by:  Patient  Timeout: timeout called immediately prior to procedure    Prep: patient was prepped and draped in usual sterile fashion         Simeon Farias M.A., LAT, ATC  Certified Athletic Trainer

## 2025-01-16 NOTE — NURSING NOTE
[address] [address]  Dept: [phone]  ______________________________________________________________________________    Patient: Idalmis Swann   : 2000   MRN: [id number]   2025    INVASIVE PROCEDURE SAFETY CHECKLIST    Date: 25   Procedure: Right De Quervains USG CSI  Patient Name: Idalmis Swann  MRN: [id number]  YOB: 2000    Action: Complete sections as appropriate. Any discrepancy results in a HARD COPY until resolved.     PRE PROCEDURE:  Patient ID verified with 2 identifiers (name and  or MRN): Yes  Procedure and site verified with patient/designee (when able): Yes  Accurate consent documentation in medical record: Yes  H&P (or appropriate assessment) documented in medical record: Yes  H&P must be up to 20 days prior to procedure and updates within 24 hours of procedure as applicable: NA  Relevant diagnostic and radiology test results appropriately labeled and displayed as applicable: Yes  Procedure site(s) marked with provider initials: NA    TIMEOUT:  Time-Out performed immediately prior to starting procedure, including verbal and active participation of all team members addressing the following:Yes  * Correct patient identify  * Confirmed that the correct side and site are marked  * An accurate procedure consent form  * Agreement on the procedure to be done  * Correct patient position  * Relevant images and results are properly labeled and appropriately displayed  * The need to administer antibiotics or fluids for irrigation purposes during the procedure as applicable   * Safety precautions based on patient history or medication use    DURING PROCEDURE: Verification of correct person, site, and procedures any time the responsibility for care of the patient is transferred to another member of the care team.       Prior to injection, verified patient identity using patient's name and date  of birth.  Due to injection administration, patient instructed to remain in clinic for 15 minutes  afterwards, and to report any adverse reaction to me immediately.    Tendon sheath injection was performed.     Lidociane Amount Wasted:  Yes: 4.5 mg/ml   Vial/Syringe: Multi dose vial  Expiration Date:  7/1/28    Kenalog Amount Wasted:  Yes: 0.5 mg/ml   Vial/Syringe: Single dose vial  Expiration Date:  9/1/26    Simeon Farias, ATC  January 16, 2025

## 2025-01-28 ENCOUNTER — ANCILLARY PROCEDURE (OUTPATIENT)
Dept: MRI IMAGING | Facility: CLINIC | Age: 25
End: 2025-01-28
Attending: STUDENT IN AN ORGANIZED HEALTH CARE EDUCATION/TRAINING PROGRAM
Payer: COMMERCIAL

## 2025-01-28 DIAGNOSIS — M25.531 WRIST PAIN, CHRONIC, RIGHT: ICD-10-CM

## 2025-01-28 DIAGNOSIS — G89.29 WRIST PAIN, CHRONIC, RIGHT: ICD-10-CM

## 2025-01-28 DIAGNOSIS — M65.4 DE QUERVAIN'S TENOSYNOVITIS, RIGHT: ICD-10-CM

## 2025-01-28 PROCEDURE — 73221 MRI JOINT UPR EXTREM W/O DYE: CPT | Mod: RT | Performed by: RADIOLOGY

## 2025-02-04 ENCOUNTER — OFFICE VISIT (OUTPATIENT)
Dept: ORTHOPEDICS | Facility: CLINIC | Age: 25
End: 2025-02-04
Attending: STUDENT IN AN ORGANIZED HEALTH CARE EDUCATION/TRAINING PROGRAM
Payer: COMMERCIAL

## 2025-02-04 DIAGNOSIS — M67.431 GANGLION OF RIGHT WRIST: ICD-10-CM

## 2025-02-04 NOTE — LETTER
2/4/2025      Idalmis Swann  [address]  [place] [address]      Dear Colleague,    Thank you for referring your patient, Idalmis Swann, to the [facility] [place]. Please see a copy of my visit note below.    Ortho Hand    HPI: 24F RHD NS p/w R dorsal wrist mass since 3/2024. It has fluctuated in size and definitely grown in size. It is painful, especially with loaded wrist extension activities. She would like it removed.     ROS: Negative, see HPI  PMH: Nondiabetic  PSH: No surgeries to the hands, wrists and/or elbows  Medications: No blood thinners  Allergies: None  SH: Nonsmoker, denies any tobacco or nicotine use  FH: No bleeding or clotting issues, or problems with anesthesia    Examination:  Nonlabored breathing  Not distressed  R dorsal radiocarpal wrist mass that is 1.5 cm, mobile, soft, mildly tender, transilluminating  No visible or palpable R volar wrist mass or tenderness    XR: No radioopaque mass or foreign body    MR: Dorsal wrist ganglion. Small volar radiocarpal ganglion.     A/P: 24F RHD NS p/w R dorsal wrist mass c/w ganglion cyst    -Discussed the etiology, natural history and treatment options for the mass. The mass can be observed, aspirated and excised. Patient would like it excised in lieu of high recurrence rate following aspiration.   -Discussed the risks of surgery, including but not limited to: infection, bleeding, pain, poor scarring, wound healing issues, recurrence of the mass, need for revision or additional surgery, injury to nerves or tendons, neuroma formation, stiffness, complex regional pain syndrome, anesthesia-related complications. Despite these risks, patient consents to RIGHT dorsal wrist mass excisional biopsy. All questions answered.   -Case request placed  -A total of 30 minutes was devoted to review of chart, direct face-to-face patient counseling and documentation during and on the day of this encounter, exclusive  of any procedure performed.    Ever Carney MD, PhD, FACS      Again, thank you for allowing me to participate in the care of your patient.        Sincerely,        Ever Carney MD    Electronically signed

## 2025-02-04 NOTE — NURSING NOTE
Reason For Visit:   Chief Complaint   Patient presents with    RECHECK     Follow-up after MRI of a right sided ganglion cyst of the wrist       Primary MD: Samira Fonseca  Ref. MD: Sussy    Age: 24 year old    ?  No      LMP 11/15/2024 (Exact Date)       Pain Assessment  Patient Currently in Pain: Yes  0-10 Pain Scale: 4  Primary Pain Location: Wrist (Right)  Pain Descriptors: Constant    Hand Dominance Evaluation  Hand Dominance: Right          QuickDASH Assessment      1/28/2025     5:53 PM   QuickDASH Main   1. Open a tight or new jar Moderate difficulty   2. Do heavy household chores (e.g., wash walls, floors) Moderate difficulty   3. Carry a shopping bag or briefcase No difficulty   4. Wash your back Mild difficulty   5. Use a knife to cut food Mild difficulty   6. Recreational activities in which you take some force or impact through your arm, shoulder or hand (e.g., golf, hammering, tennis, etc.) Moderate difficulty   7. During the past week, to what extent has your arm, shoulder or hand problem interfered with your normal social activities with family, friends, neighbours or groups Moderately   8. During the past week, were you limited in your work or other regular daily activities as a result of your arm, shoulder or hand problem Moderately limited   9. Arm, shoulder or hand pain Mild   10.Tingling (pins and needles) in your arm,shoulder or hand Moderate   11. During the past week, how much difficulty have you had sleeping because of the pain in your arm, shoulder or hand Moderate difficulty   Quickdash Ability Score 38.64          Current Outpatient Medications   Medication Sig Dispense Refill    albuterol (PROAIR HFA/PROVENTIL HFA/VENTOLIN HFA) 108 (90 Base) MCG/ACT inhaler Inhale 2 puffs into the lungs every 6 hours as needed for shortness of breath, wheezing or cough 18 g 0    albuterol (PROVENTIL) (2.5 MG/3ML) 0.083% neb solution TAKE 3 ML (2.5 MG) EVERY 6 HOURS AS NEEDED BY  NEBULIZATION FOR WHEEZING FOR UP TO 14 DAYS      EPINEPHrine (ANY BX GENERIC EQUIV) 0.3 MG/0.3ML injection 2-pack Inject 0.3 mLs (0.3 mg) into the muscle as needed for anaphylaxis 2 each 1    fluticasone-salmeterol (AIRDUO RESPICLICK) 232-14 MCG/ACT inhaler Inhale 1 puff into the lungs 2 times daily 1 each 3    ondansetron (ZOFRAN ODT) 4 MG ODT tab Take 1 tablet (4 mg) by mouth every 8 hours as needed for nausea. 30 tablet 1    topiramate ER (QUDEXY XR) 25 MG 24 hr capsule Take 1 capsule (25 mg) by mouth daily. 90 capsule 3       Allergies   Allergen Reactions    Cat Dander Shortness Of Breath    Vitex Swelling     Hazelnut tree allergy skin test    Bactrim [Sulfamethoxazole-Trimethoprim]     Elemental Sulfur     Penicillins     Sulfa Antibiotics Nausea and Vomiting    Tree Extract      Hazelnut tree allergy skin test    Tree Nuts [Nuts]     Alternaria Alternata Allergy Skin Test Rash    Dog Epithelium (Canis Lupus Familiaris) Itching    Latex Rash    Mold Cough       Meghna Ashby, ATC

## 2025-02-05 ENCOUNTER — TELEPHONE (OUTPATIENT)
Dept: ORTHOPEDICS | Facility: CLINIC | Age: 25
End: 2025-02-05
Payer: COMMERCIAL

## 2025-02-05 PROBLEM — M67.431 GANGLION OF RIGHT WRIST: Status: ACTIVE | Noted: 2024-05-15

## 2025-02-05 SDOH — HEALTH STABILITY: PHYSICAL HEALTH: ON AVERAGE, HOW MANY DAYS PER WEEK DO YOU ENGAGE IN MODERATE TO STRENUOUS EXERCISE (LIKE A BRISK WALK)?: 5 DAYS

## 2025-02-05 SDOH — HEALTH STABILITY: PHYSICAL HEALTH: ON AVERAGE, HOW MANY MINUTES DO YOU ENGAGE IN EXERCISE AT THIS LEVEL?: 40 MIN

## 2025-02-05 ASSESSMENT — ASTHMA QUESTIONNAIRES
ACT_TOTALSCORE: 24
QUESTION_2 LAST FOUR WEEKS HOW OFTEN HAVE YOU HAD SHORTNESS OF BREATH: ONCE OR TWICE A WEEK
QUESTION_5 LAST FOUR WEEKS HOW WOULD YOU RATE YOUR ASTHMA CONTROL: COMPLETELY CONTROLLED
QUESTION_1 LAST FOUR WEEKS HOW MUCH OF THE TIME DID YOUR ASTHMA KEEP YOU FROM GETTING AS MUCH DONE AT WORK, SCHOOL OR AT HOME: NONE OF THE TIME
ACT_TOTALSCORE: 24
QUESTION_4 LAST FOUR WEEKS HOW OFTEN HAVE YOU USED YOUR RESCUE INHALER OR NEBULIZER MEDICATION (SUCH AS ALBUTEROL): NOT AT ALL
QUESTION_3 LAST FOUR WEEKS HOW OFTEN DID YOUR ASTHMA SYMPTOMS (WHEEZING, COUGHING, SHORTNESS OF BREATH, CHEST TIGHTNESS OR PAIN) WAKE YOU UP AT NIGHT OR EARLIER THAN USUAL IN THE MORNING: NOT AT ALL

## 2025-02-05 ASSESSMENT — SOCIAL DETERMINANTS OF HEALTH (SDOH): HOW OFTEN DO YOU GET TOGETHER WITH FRIENDS OR RELATIVES?: NEVER

## 2025-02-05 NOTE — TELEPHONE ENCOUNTER
Called to schedule surgery with: Dr. Carney    Spoke with: Idalmis     Date of Surgery: 5/29    Estimated Arrival time Discussed with Patient:  No    Location of surgery: [facility]    Pre-operative H&P: patient confirmed they will schedule with primary care clinic.    Post-operative Appointment w/TREMAYNE or Surgeon: Cynthia Wong PA-C 6/13 at 3:20 PM    Additional Appointments: N/A, no additional visits requested    Discussed with patient pre-op RN will call 2-3 days prior to surgery with arrival time and instructions:  Yes     Standard Ortho Surgery Packet: Yes - was provided to patient during appointment    All patients questions were answered and was instructed to contact the clinic with any questions or concerns.     Additional Comments:  Patient requested a call back regarding questions about recovery and restrictions after surgery      Mary Escobar on 2/5/2025 at 10:03 AM

## 2025-02-05 NOTE — PROGRESS NOTES
Ortho Hand    HPI: 24F RHD NS p/w R dorsal wrist mass since 3/2024. It has fluctuated in size and definitely grown in size. It is painful, especially with loaded wrist extension activities. She would like it removed.     ROS: Negative, see HPI  PMH: Nondiabetic  PSH: No surgeries to the hands, wrists and/or elbows  Medications: No blood thinners  Allergies: None  SH: Nonsmoker, denies any tobacco or nicotine use  FH: No bleeding or clotting issues, or problems with anesthesia    Examination:  Nonlabored breathing  Not distressed  R dorsal radiocarpal wrist mass that is 1.5 cm, mobile, soft, mildly tender, transilluminating  No visible or palpable R volar wrist mass or tenderness    XR: No radioopaque mass or foreign body    MR: Dorsal wrist ganglion. Small volar radiocarpal ganglion.     A/P: 24F RHD NS p/w R dorsal wrist mass c/w ganglion cyst    -Discussed the etiology, natural history and treatment options for the mass. The mass can be observed, aspirated and excised. Patient would like it excised in lieu of high recurrence rate following aspiration.   -Discussed the risks of surgery, including but not limited to: infection, bleeding, pain, poor scarring, wound healing issues, recurrence of the mass, need for revision or additional surgery, injury to nerves or tendons, neuroma formation, stiffness, complex regional pain syndrome, anesthesia-related complications. Despite these risks, patient consents to RIGHT dorsal wrist mass excisional biopsy. All questions answered.   -Case request placed  -A total of 30 minutes was devoted to review of chart, direct face-to-face patient counseling and documentation during and on the day of this encounter, exclusive of any procedure performed.    Ever Carney MD, PhD, FACS

## 2025-02-12 ENCOUNTER — OFFICE VISIT (OUTPATIENT)
Dept: INTERNAL MEDICINE | Facility: CLINIC | Age: 25
End: 2025-02-12
Attending: STUDENT IN AN ORGANIZED HEALTH CARE EDUCATION/TRAINING PROGRAM
Payer: COMMERCIAL

## 2025-02-12 ENCOUNTER — PATIENT OUTREACH (OUTPATIENT)
Dept: ONCOLOGY | Facility: CLINIC | Age: 25
End: 2025-02-12

## 2025-02-12 VITALS
WEIGHT: 215.7 LBS | HEIGHT: 66 IN | SYSTOLIC BLOOD PRESSURE: 128 MMHG | DIASTOLIC BLOOD PRESSURE: 82 MMHG | BODY MASS INDEX: 34.67 KG/M2 | HEART RATE: 80 BPM | OXYGEN SATURATION: 97 % | RESPIRATION RATE: 16 BRPM | TEMPERATURE: 98.5 F

## 2025-02-12 DIAGNOSIS — Z00.00 ROUTINE GENERAL MEDICAL EXAMINATION AT A HEALTH CARE FACILITY: Primary | ICD-10-CM

## 2025-02-12 DIAGNOSIS — Z82.49 FAMILY HISTORY OF CARDIOMYOPATHY: ICD-10-CM

## 2025-02-12 DIAGNOSIS — E66.811 CLASS 1 OBESITY WITH SERIOUS COMORBIDITY AND BODY MASS INDEX (BMI) OF 34.0 TO 34.9 IN ADULT, UNSPECIFIED OBESITY TYPE: ICD-10-CM

## 2025-02-12 DIAGNOSIS — J45.40 MODERATE PERSISTENT ASTHMA WITHOUT COMPLICATION: ICD-10-CM

## 2025-02-12 DIAGNOSIS — U09.9 CHRONIC POST-COVID-19 SYNDROME: ICD-10-CM

## 2025-02-12 DIAGNOSIS — Z91.89 AT HIGH RISK FOR BREAST CANCER: ICD-10-CM

## 2025-02-12 DIAGNOSIS — M67.431 GANGLION OF RIGHT WRIST: ICD-10-CM

## 2025-02-12 DIAGNOSIS — N80.9 ENDOMETRIOSIS: ICD-10-CM

## 2025-02-12 DIAGNOSIS — G43.119 INTRACTABLE MIGRAINE WITH AURA WITHOUT STATUS MIGRAINOSUS: ICD-10-CM

## 2025-02-12 DIAGNOSIS — Z23 ENCOUNTER FOR VACCINATION: ICD-10-CM

## 2025-02-12 DIAGNOSIS — E78.2 MIXED DYSLIPIDEMIA: ICD-10-CM

## 2025-02-12 PROBLEM — N64.52 BILATERAL NIPPLE DISCHARGE: Status: RESOLVED | Noted: 2024-07-17 | Resolved: 2025-02-12

## 2025-02-12 LAB
ALBUMIN SERPL BCG-MCNC: 4.3 G/DL (ref 3.5–5.2)
ALP SERPL-CCNC: 93 U/L (ref 40–150)
ALT SERPL W P-5'-P-CCNC: 44 U/L (ref 0–50)
ANION GAP SERPL CALCULATED.3IONS-SCNC: 10 MMOL/L (ref 7–15)
AST SERPL W P-5'-P-CCNC: 30 U/L (ref 0–45)
BASOPHILS # BLD AUTO: 0 10E3/UL (ref 0–0.2)
BASOPHILS NFR BLD AUTO: 0 %
BILIRUB SERPL-MCNC: 0.2 MG/DL
BUN SERPL-MCNC: 13.1 MG/DL (ref 6–20)
CALCIUM SERPL-MCNC: 9.4 MG/DL (ref 8.8–10.4)
CHLORIDE SERPL-SCNC: 105 MMOL/L (ref 98–107)
CHOLEST SERPL-MCNC: 227 MG/DL
CREAT SERPL-MCNC: 0.8 MG/DL (ref 0.51–0.95)
EGFRCR SERPLBLD CKD-EPI 2021: >90 ML/MIN/1.73M2
EOSINOPHIL # BLD AUTO: 0.2 10E3/UL (ref 0–0.7)
EOSINOPHIL NFR BLD AUTO: 2 %
ERYTHROCYTE [DISTWIDTH] IN BLOOD BY AUTOMATED COUNT: 13.3 % (ref 10–15)
EST. AVERAGE GLUCOSE BLD GHB EST-MCNC: 97 MG/DL
FASTING STATUS PATIENT QL REPORTED: YES
FASTING STATUS PATIENT QL REPORTED: YES
GLUCOSE SERPL-MCNC: 90 MG/DL (ref 70–99)
HBA1C MFR BLD: 5 % (ref 0–5.6)
HCO3 SERPL-SCNC: 24 MMOL/L (ref 22–29)
HCT VFR BLD AUTO: 40.2 % (ref 35–47)
HDLC SERPL-MCNC: 57 MG/DL
HGB BLD-MCNC: 13.7 G/DL (ref 11.7–15.7)
IMM GRANULOCYTES # BLD: 0 10E3/UL
IMM GRANULOCYTES NFR BLD: 0 %
LDLC SERPL CALC-MCNC: 141 MG/DL
LYMPHOCYTES # BLD AUTO: 3.2 10E3/UL (ref 0.8–5.3)
LYMPHOCYTES NFR BLD AUTO: 24 %
MCH RBC QN AUTO: 28.7 PG (ref 26.5–33)
MCHC RBC AUTO-ENTMCNC: 34.1 G/DL (ref 31.5–36.5)
MCV RBC AUTO: 84 FL (ref 78–100)
MONOCYTES # BLD AUTO: 0.8 10E3/UL (ref 0–1.3)
MONOCYTES NFR BLD AUTO: 6 %
NEUTROPHILS # BLD AUTO: 9 10E3/UL (ref 1.6–8.3)
NEUTROPHILS NFR BLD AUTO: 68 %
NONHDLC SERPL-MCNC: 170 MG/DL
PLATELET # BLD AUTO: 326 10E3/UL (ref 150–450)
POTASSIUM SERPL-SCNC: 4.6 MMOL/L (ref 3.4–5.3)
PROT SERPL-MCNC: 7.3 G/DL (ref 6.4–8.3)
RBC # BLD AUTO: 4.77 10E6/UL (ref 3.8–5.2)
SODIUM SERPL-SCNC: 139 MMOL/L (ref 135–145)
TRIGL SERPL-MCNC: 144 MG/DL
WBC # BLD AUTO: 13.3 10E3/UL (ref 4–11)

## 2025-02-12 RX ORDER — TOPIRAMATE 50 MG/1
50 CAPSULE, EXTENDED RELEASE ORAL DAILY
Qty: 90 CAPSULE | Refills: 1 | Status: SHIPPED | OUTPATIENT
Start: 2025-02-12

## 2025-02-12 RX ORDER — FLUTICASONE PROPIONATE AND SALMETEROL 500; 50 UG/1; UG/1
1 POWDER RESPIRATORY (INHALATION) 2 TIMES DAILY
COMMUNITY

## 2025-02-12 ASSESSMENT — PAIN SCALES - GENERAL: PAINLEVEL_OUTOF10: NO PAIN (0)

## 2025-02-12 NOTE — PROGRESS NOTES
Preventive Care Visit  [facility] Glenys Kyle MD, Internal Medicine  Feb 12, 2025      Assessment & Plan   Problem List Items Addressed This Visit          Nervous and Auditory    Intractable migraine with aura without status migrainosus     Has had migraines since 2013, worsened after concussion in 2016. Started topamax back in September. Has made a big difference. Prior to starting was getting 2-3 migraines a week. Now maybe 1-2 times a month.   - We are going to increase topamax 25 -> 50 mg daily for both migraines and weight  - F/up 3 months          Relevant Medications    topiramate ER (QUDEXY XR) 50 MG 24 hr capsule       Respiratory    Moderate persistent asthma without complication     Diagnosed in childhood, worsened after COVID.  Currently very well-controlled and only uses her Advair inhaler as needed maybe 1-2 times a month.  - Could consider decrease dose of advair for PRN use with next fill         Relevant Medications    fluticasone-salmeterol (ADVAIR) 500-50 MCG/ACT inhaler       Digestive    Class 1 obesity with serious comorbidity and body mass index (BMI) of 34.0 to 34.9 in adult     Tells me she is struggled with weight most of her life.  She works out daily and eats healthy but has not been able to move the scale.  She was put on Topamax 9/2024 for migraines and has tolerated 25 mg dose well.  We discussed that increasing the dose would be reasonable to see if we can have some help with weight loss versus adding other medications.  She would like to start with increasing the Topamax.  -Increase Topamax from 25 to 50 mg daily  -Follow-up in 3 months         Relevant Medications    topiramate ER (QUDEXY XR) 50 MG 24 hr capsule       Endocrine    Mixed dyslipidemia     Elevated LDL in the past, last 148 (2024)  - Repeat lipids today  - Continue healthy lifestyle         Relevant Orders    Lipid panel reflex to direct LDL Fasting       Musculoskeletal and  Integumentary    Ganglion of right wrist     Removal scheduled 5/2025.            Other    Endometriosis     Dx on laparoscopy 2021, pelvic pain managed with OCP previously but off birth control since she had scare with blood clot 5/2024.   - CTM  - Continue to follow with GYN         Chronic post-COVID-19 syndrome     Does suffer chronic fatigue and some brain fog after COVID infection in 8/2022.         Routine general medical examination at a health care facility - Primary     We discussed healthy lifestyle, nutrition, cardiovascular risk reduction, self care, safety, sunscreen, and timing of cancer screening.  Health maintenance screening and immunizations reviewed with the patient.  - Checking basic labs today   - Pap next due 2027  - PCV20, declines COVID vaccine, otherwise is up to date           Relevant Orders    CBC with platelets and differential    Comprehensive metabolic panel    Hemoglobin A1c    Family history of cardiomyopathy     She tells me her dad had a history of some sort of genetic cardiomyopathy, does not have more information as she does not speak with her father anymore.  -Will get screening echo         Relevant Orders    Echocardiogram Complete    At high risk for breast cancer     Patient tells me that Dr. 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  She has 2 aunts with breast cancer in her 40s, no first-degree relatives.  -Genetics referral placed         Relevant Orders    Adult Genetics & Metabolism  Referral     Other Visit Diagnoses       Encounter for vaccination        Relevant Orders    Pneumococcal 20 Valent Conjugate (Prevnar 20) (Completed)             Patient has been advised of split billing requirements and indicates understanding: Yes       Counseling  Appropriate preventive services were addressed with this patient via screening, questionnaire, or discussion as appropriate  "for fall prevention, nutrition, physical activity, Tobacco-use cessation, social engagement, weight loss and cognition.  Checklist reviewing preventive services available has been given to the patient.  Reviewed patient's diet, addressing concerns and/or questions.   Patient is at risk for social isolation and has been provided with information about the benefit of social connection.     The longitudinal plan of care for the diagnosis(es)/condition(s) as documented were addressed during this visit. Due to the added complexity in care, I will continue to support Idalmis in the subsequent management and with ongoing continuity of care.    FUTURE APPOINTMENTS:       - Follow-up visit in 3 months       Subjective   Idalmis is a 24 year old, presenting for the following:  Physical and Recheck Medication (Topiramate. Has been on it for awhile, but has not followed up regarding this. Unsure if she should continue this. )        2/12/2025     7:40 AM   Additional Questions   Roomed by MUNA Mart    Idalmis is here for physical and to establish care. From [place], moved here for work. Works for [employer] department. Tells me she has been a \"medical mystery\" all her life. We reviewed her history as noted below.    R wrist ganglion cyst: scheduled removal with ortho 5/29/25    Migraine: topamax 25 mg daily. Has had migraines since 2013, worsened after concussion in 2016. Started topamax back in September. Has made a big difference. Prior to starting was getting 2-3 migraines a week. Now maybe 1-2 times a month.     Asthma: airduo PRN and albuterol PRN. Diagnosed in childhood, worsened after COVID. Uses maybe once a month.     Endometriosis: Dx on laparoscopy 2021, pelvic pain managed with OCP previously but off birth control since she had scare with blood clot 5/2024.     Works out daily and eats healthy. Was put on Metformin 2/2024 but stopped after possible blood clot 5/2024.     Wondering about BRCA testing. " Had breast surgery over the summer for leaking breasts and they told her that she was high risk for breast cancer.   - For mammary duct ectasia  - Two aunts breast cancer in age 40s, not Mom or Dad.     Saw hematology in [place] for leukocytosis. Nothing came of this.     Family history of HLD. Mom with CAC of 71 recently. Dad with some sort of cardiomyopathy, unclear if HoCM but was told it was genetic.     Grandfather had blood clots. No parents or siblings.     Takes B complex vitamin, 2000IU vitamin D3, cranberry supplement, MVI and fish oil.     HCM: Pap done 6/2024 NILM. No abnormals.     Health Care Directive  Patient does not have a Health Care Directive: Discussed advance care planning with patient; information given to patient to review.      2/5/2025   General Health   How would you rate your overall physical health? Good   Feel stress (tense, anxious, or unable to sleep) Only a little         2/5/2025   Nutrition   Three or more servings of calcium each day? Yes   Diet: Regular (no restrictions)   How many servings of fruit and vegetables per day? (!) 2-3   How many sweetened beverages each day? 0-1         2/5/2025   Exercise   Days per week of moderate/strenous exercise 5 days   Average minutes spent exercising at this level 40 min         2/5/2025   Social Factors   Frequency of gathering with friends or relatives Never   Worry food won't last until get money to buy more No   Food not last or not have enough money for food? No   Do you have housing? (Housing is defined as stable permanent housing and does not include staying ouside in a car, in a tent, in an abandoned building, in an overnight shelter, or couch-surfing.) Yes   Are you worried about losing your housing? No   Lack of transportation? No   Unable to get utilities (heat,electricity)? No   (!) SOCIAL CONNECTIONS CONCERN      2/5/2025   Dental   Dentist two times every year? Yes        Today's PHQ-2 Score:       2/12/2025     7:33 AM    PHQ-2 ( 1999 Pf[employer])   Q1: Little interest or pleasure in doing things 0   Q2: Feeling down, depressed or hopeless 0   PHQ-2 Score 0    Q1: Little interest or pleasure in doing things Not at all   Q2: Feeling down, depressed or hopeless Not at all   PHQ-2 Score 0       Patient-reported           2/5/2025   Substance Use   Alcohol more than 3/day or more than 7/wk No   Do you use any other substances recreationally? No     Social History     Tobacco Use    Smoking status: Never    Smokeless tobacco: Never   Vaping Use    Vaping status: Never Used   Substance Use Topics    Alcohol use: Not Currently    Drug use: Yes     Types: Marijuana           2/5/2025   STI Screening   New sexual partner(s) since last STI/HIV test? No     History of abnormal Pap smear: No - age 21-29 PAP every 3 years recommended             2/5/2025   Contraception/Family Planning   Questions about contraception or family planning No     Reviewed and updated as needed this visit by Provider   Tobacco  Allergies  Meds  Problems  Med Hx  Surg Hx  Fam Hx            Past Medical History:   Diagnosis Date    Anemia, iron deficiency     COVID-19 long hauler manifesting chronic decreased mobility and endurance 12/12/2022    Last Assessment & Plan:    Formatting of this note might be different from the original.   Reports that she no longer struggles with exertional tolerance.  She completed a 12-week power weightlifting program and did well.  Formatting of this note might be different from the original.   Last Assessment & Plan:    Formatting of this note might be different from the original.   OT referral. Screen.      Endometriosis 08/2021    documented by photo/visualization.  no biopsy performed.    Hemorrhagic ovarian cyst 03/03/2021    Last Assessment & Plan:    Formatting of this note is different from the original.   +acute pelvic pain with +abnormal CT scan       CT abdomen/pelvis = +?ileitis and + hemorrhagic left ovarian cyst, -  "since ovarian cyst has ruptured and no longer seen on US   Rest/ NSAIDS PRN    Hydrate       US of 5/18/2021 is unremarkable. No ovarian cyst seen. Patient is following up with surgeon to do explora    Nut allergy     Obese     Other iron deficiency anemia 02/07/2024    Reactive airway disease 09/07/2022    Last Assessment & Plan:    Formatting of this note might be different from the original.   Hx of asthma since 3 yrs old      Restart advair 250/50 ordered 9/7/2022   Restart decadron 6 mg daily x 5 days   Refill the duo-nebs prn   Refill the proair       CXR negative 9/6/202 and engative 8/31/2022    F/u in office w/in 4-6 weeks      Thyroid disease     Uncomplicated asthma      Past Surgical History:   Procedure Laterality Date    ABDOMEN SURGERY  2022    Laproscopic    BIOPSY BREAST Bilateral 08/01/2024    Procedure: SUBAREOLAR DUCT EXCISION;  Surgeon: Indira Amos DO;  Location: [facility]    BREAST SURGERY  2024    LAPAROSCOPY DIAGNOSTIC (GYN)  08/2021    Surgery to remove endometriosis unable to remove all. Some on colon    TONSILLECTOMY & ADENOIDECTOMY  05/2023       Review of Systems  Constitutional, neuro, ENT, endocrine, pulmonary, cardiac, gastrointestinal, genitourinary, musculoskeletal, integument and psychiatric systems are negative, except as otherwise noted.     Objective    Exam  /82 (BP Location: Right arm, Patient Position: Sitting, Cuff Size: Adult Regular)   Pulse 80   Temp 98.5  F (36.9  C) (Oral)   Resp 16   Ht 1.676 m (5' 6\")   Wt 97.8 kg (215 lb 11.2 oz)   LMP  (LMP Unknown)   SpO2 97%   Breastfeeding No   BMI 34.81 kg/m     Estimated body mass index is 34.81 kg/m  as calculated from the following:    Height as of this encounter: 1.676 m (5' 6\").    Weight as of this encounter: 97.8 kg (215 lb 11.2 oz).    Physical Exam  GENERAL: alert and no distress  EYES: Eyes grossly normal to inspection and conjunctivae and sclerae normal  HENT: ear canals and TM's normal, " nose and mouth without ulcers or lesions  RESP: lungs clear to auscultation - no rales, rhonchi or wheezes  CV: regular rate and rhythm, normal S1 S2, no S3 or S4, no murmur, click or rub, no peripheral edema  ABDOMEN: soft, nontender  MS: no gross musculoskeletal defects noted, no edema  SKIN: no suspicious lesions or rashes on exposed skin   NEURO: Normal strength and tone, mentation intact and speech normal  PSYCH: mentation appears normal, affect normal/bright        Signed Electronically by: Megan Kyle MD

## 2025-02-12 NOTE — ASSESSMENT & PLAN NOTE
Dx on laparoscopy 2021, pelvic pain managed with OCP previously but off birth control since she had scare with blood clot 5/2024.   - CTM  - Continue to follow with GYN

## 2025-02-12 NOTE — ASSESSMENT & PLAN NOTE
Diagnosed in childhood, worsened after COVID.  Currently very well-controlled and only uses her Advair inhaler as needed maybe 1-2 times a month.  - Could consider decrease dose of advair for PRN use with next fill

## 2025-02-12 NOTE — ASSESSMENT & PLAN NOTE
Patient tells me that Dr. 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  She has 2 aunts with breast cancer in her 40s, no first-degree relatives.  -Genetics referral placed

## 2025-02-12 NOTE — PROGRESS NOTES
New Patient Oncology Nurse Navigator Note     Referring provider: Megan Kyle MD      Referring Clinic/Organization: [facility]      Referred to (specialty:) Genetic Counseling and Cancer Risk Management     Requested provider (if applicable): NA     Date Referral Received: February 12, 2025     Evaluation for:  Z91.89 (ICD-10-CM) - At high risk for breast cancer      At high risk for breast cancer        Patient tells me that Dr. 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  She has 2 aunts with breast cancer in her 40s, no first-degree relatives.        Payor: MEDICA / Plan: MEDICA VANTAGE PLUS SELF INSURED / Product Type: Indemnity /     February 12, 2025  Referral received and reviewed.   Sent to NPS to schedule.     Juju MARROQUINN, RN, OCN  Oncology Nurse Navigator   [facility]  Cancer Care Service Line   New Patient Hem/Onc Scheduling / Referrals: [phone] (fax: [phone] )

## 2025-02-12 NOTE — ASSESSMENT & PLAN NOTE
Tells me she is struggled with weight most of her life.  She works out daily and eats healthy but has not been able to move the scale.  She was put on Topamax 9/2024 for migraines and has tolerated 25 mg dose well.  We discussed that increasing the dose would be reasonable to see if we can have some help with weight loss versus adding other medications.  She would like to start with increasing the Topamax.  -Increase Topamax from 25 to 50 mg daily  -Follow-up in 3 months

## 2025-02-12 NOTE — ASSESSMENT & PLAN NOTE
We discussed healthy lifestyle, nutrition, cardiovascular risk reduction, self care, safety, sunscreen, and timing of cancer screening.  Health maintenance screening and immunizations reviewed with the patient.  - Checking basic labs today   - Pap next due 2027  - PCV20, declines COVID vaccine, otherwise is up to date

## 2025-02-12 NOTE — ASSESSMENT & PLAN NOTE
She tells me her dad had a history of some sort of genetic cardiomyopathy, does not have more information as she does not speak with her father anymore.  -Will get screening echo

## 2025-02-12 NOTE — PATIENT INSTRUCTIONS
Patient Education   Preventive Care Advice   This is general advice given by our system to help you stay healthy. However, your care team may have specific advice just for you. Please talk to your care team about your preventive care needs.  Nutrition  Eat 5 or more servings of fruits and vegetables each day.  Try wheat bread, brown rice and whole grain pasta (instead of white bread, rice, and pasta).  Get enough calcium and vitamin D. Check the label on foods and aim for 100% of the RDA (recommended daily allowance).  Lifestyle  Exercise at least 150 minutes each week  (30 minutes a day, 5 days a week).  Do muscle strengthening activities 2 days a week. These help control your weight and prevent disease.  No smoking.  Wear sunscreen to prevent skin cancer.  Have a dental exam and cleaning every 6 months.  Yearly exams  See your health care team every year to talk about:  Any changes in your health.  Any medicines your care team has prescribed.  Preventive care, family planning, and ways to prevent chronic diseases.  Shots (vaccines)   HPV shots (up to age 26), if you've never had them before.  Hepatitis B shots (up to age 59), if you've never had them before.  COVID-19 shot: Get this shot when it's due.  Flu shot: Get a flu shot every year.  Tetanus shot: Get a tetanus shot every 10 years.  Pneumococcal, hepatitis A, and RSV shots: Ask your care team if you need these based on your risk.  Shingles shot (for age 50 and up)  General health tests  Diabetes screening:  Starting at age 35, Get screened for diabetes at least every 3 years.  If you are younger than age 35, ask your care team if you should be screened for diabetes.  Cholesterol test: At age 39, start having a cholesterol test every 5 years, or more often if advised.  Bone density scan (DEXA): At age 50, ask your care team if you should have this scan for osteoporosis (brittle bones).  Hepatitis C: Get tested at least once in your life.  STIs (sexually  transmitted infections)  Before age 24: Ask your care team if you should be screened for STIs.  After age 24: Get screened for STIs if you're at risk. You are at risk for STIs (including HIV) if:  You are sexually active with more than one person.  You don't use condoms every time.  You or a partner was diagnosed with a sexually transmitted infection.  If you are at risk for HIV, ask about PrEP medicine to prevent HIV.  Get tested for HIV at least once in your life, whether you are at risk for HIV or not.  Cancer screening tests  Cervical cancer screening: If you have a cervix, begin getting regular cervical cancer screening tests starting at age 21.  Breast cancer scan (mammogram): If you've ever had breasts, begin having regular mammograms starting at age 40. This is a scan to check for breast cancer.  Colon cancer screening: It is important to start screening for colon cancer at age 45.  Have a colonoscopy test every 10 years (or more often if you're at risk) Or, ask your provider about stool tests like a FIT test every year or Cologuard test every 3 years.  To learn more about your testing options, visit:   .  For help making a decision, visit:   https://bit.ly/cq98749.  Prostate cancer screening test: If you have a prostate, ask your care team if a prostate cancer screening test (PSA) at age 55 is right for you.  Lung cancer screening: If you are a current or former smoker ages 50 to 80, ask your care team if ongoing lung cancer screenings are right for you.  For informational purposes only. Not to replace the advice of your health care provider. Copyright   2023 [employer]. All rights reserved. Clinically reviewed by the [facility] Transitions Program. Deenty [id number] - REV 01/24.

## 2025-03-06 ENCOUNTER — HOSPITAL ENCOUNTER (OUTPATIENT)
Dept: CARDIOLOGY | Facility: HOSPITAL | Age: 25
End: 2025-03-06
Attending: INTERNAL MEDICINE
Payer: COMMERCIAL

## 2025-03-06 DIAGNOSIS — Z82.49 FAMILY HISTORY OF CARDIOMYOPATHY: ICD-10-CM

## 2025-03-06 LAB — LVEF ECHO: NORMAL

## 2025-03-06 PROCEDURE — 255N000002 HC RX 255 OP 636: Performed by: INTERNAL MEDICINE

## 2025-03-06 RX ADMIN — PERFLUTREN 2 ML: 6.52 INJECTION, SUSPENSION INTRAVENOUS at 08:44

## 2025-05-06 ENCOUNTER — OFFICE VISIT (OUTPATIENT)
Dept: INTERNAL MEDICINE | Facility: CLINIC | Age: 25
End: 2025-05-06
Payer: COMMERCIAL

## 2025-05-06 VITALS
HEIGHT: 66 IN | OXYGEN SATURATION: 99 % | SYSTOLIC BLOOD PRESSURE: 131 MMHG | HEART RATE: 87 BPM | WEIGHT: 216.2 LBS | RESPIRATION RATE: 20 BRPM | DIASTOLIC BLOOD PRESSURE: 86 MMHG | TEMPERATURE: 98.4 F | BODY MASS INDEX: 34.75 KG/M2

## 2025-05-06 DIAGNOSIS — E66.811 CLASS 1 OBESITY WITH SERIOUS COMORBIDITY AND BODY MASS INDEX (BMI) OF 34.0 TO 34.9 IN ADULT, UNSPECIFIED OBESITY TYPE: ICD-10-CM

## 2025-05-06 DIAGNOSIS — Z01.818 PREOP GENERAL PHYSICAL EXAM: Primary | ICD-10-CM

## 2025-05-06 DIAGNOSIS — J45.40 MODERATE PERSISTENT ASTHMA WITHOUT COMPLICATION: ICD-10-CM

## 2025-05-06 DIAGNOSIS — E78.2 MIXED DYSLIPIDEMIA: ICD-10-CM

## 2025-05-06 DIAGNOSIS — M67.431 GANGLION OF RIGHT WRIST: ICD-10-CM

## 2025-05-06 DIAGNOSIS — G43.119 INTRACTABLE MIGRAINE WITH AURA WITHOUT STATUS MIGRAINOSUS: ICD-10-CM

## 2025-05-06 LAB
ANION GAP SERPL CALCULATED.3IONS-SCNC: 9 MMOL/L (ref 7–15)
BASOPHILS # BLD AUTO: 0.1 10E3/UL (ref 0–0.2)
BASOPHILS NFR BLD AUTO: 0 %
BUN SERPL-MCNC: 12.6 MG/DL (ref 6–20)
CALCIUM SERPL-MCNC: 9.8 MG/DL (ref 8.8–10.4)
CHLORIDE SERPL-SCNC: 104 MMOL/L (ref 98–107)
CREAT SERPL-MCNC: 0.79 MG/DL (ref 0.51–0.95)
EGFRCR SERPLBLD CKD-EPI 2021: >90 ML/MIN/1.73M2
EOSINOPHIL # BLD AUTO: 0.3 10E3/UL (ref 0–0.7)
EOSINOPHIL NFR BLD AUTO: 2 %
ERYTHROCYTE [DISTWIDTH] IN BLOOD BY AUTOMATED COUNT: 13.1 % (ref 10–15)
GLUCOSE SERPL-MCNC: 93 MG/DL (ref 70–99)
HCO3 SERPL-SCNC: 24 MMOL/L (ref 22–29)
HCT VFR BLD AUTO: 41.1 % (ref 35–47)
HGB BLD-MCNC: 14 G/DL (ref 11.7–15.7)
IMM GRANULOCYTES # BLD: 0 10E3/UL
IMM GRANULOCYTES NFR BLD: 0 %
LYMPHOCYTES # BLD AUTO: 3.3 10E3/UL (ref 0.8–5.3)
LYMPHOCYTES NFR BLD AUTO: 25 %
MCH RBC QN AUTO: 29.2 PG (ref 26.5–33)
MCHC RBC AUTO-ENTMCNC: 34.1 G/DL (ref 31.5–36.5)
MCV RBC AUTO: 86 FL (ref 78–100)
MONOCYTES # BLD AUTO: 0.8 10E3/UL (ref 0–1.3)
MONOCYTES NFR BLD AUTO: 7 %
NEUTROPHILS # BLD AUTO: 8.6 10E3/UL (ref 1.6–8.3)
NEUTROPHILS NFR BLD AUTO: 66 %
PLATELET # BLD AUTO: 372 10E3/UL (ref 150–450)
POTASSIUM SERPL-SCNC: 4.6 MMOL/L (ref 3.4–5.3)
RBC # BLD AUTO: 4.8 10E6/UL (ref 3.8–5.2)
SODIUM SERPL-SCNC: 137 MMOL/L (ref 135–145)
WBC # BLD AUTO: 13 10E3/UL (ref 4–11)

## 2025-05-06 PROCEDURE — 36415 COLL VENOUS BLD VENIPUNCTURE: CPT | Performed by: INTERNAL MEDICINE

## 2025-05-06 PROCEDURE — 80048 BASIC METABOLIC PNL TOTAL CA: CPT | Performed by: INTERNAL MEDICINE

## 2025-05-06 PROCEDURE — 85025 COMPLETE CBC W/AUTO DIFF WBC: CPT | Performed by: INTERNAL MEDICINE

## 2025-05-06 PROCEDURE — 3075F SYST BP GE 130 - 139MM HG: CPT | Performed by: INTERNAL MEDICINE

## 2025-05-06 PROCEDURE — 3079F DIAST BP 80-89 MM HG: CPT | Performed by: INTERNAL MEDICINE

## 2025-05-06 PROCEDURE — 99214 OFFICE O/P EST MOD 30 MIN: CPT | Performed by: INTERNAL MEDICINE

## 2025-05-06 RX ORDER — TOPIRAMATE 25 MG/1
25 CAPSULE, EXTENDED RELEASE ORAL DAILY
Qty: 90 CAPSULE | Refills: 1 | Status: SHIPPED | OUTPATIENT
Start: 2025-05-06

## 2025-05-06 NOTE — ASSESSMENT & PLAN NOTE
Here for pre-op prior to ganglion cyst removal. Chronic conditions stable. No known cardiac history (and normal TTE 3/2025).   - BMP and CBC stable  - Medication hold times below  - Approval given

## 2025-05-06 NOTE — PROGRESS NOTES
Preoperative Evaluation  [facility]  [address]  [place] [address]  Phone: [phone]  Fax: [phone]  Primary Provider: Megan Kyle MD  Pre-op Performing Provider: Megan Kyle MD  May 6, 2025             5/3/2025   Surgical Information   What procedure is being done? Excision of ganglion cyst, right wrist   Facility or Hospital where procedure/surgery will be performed: [facility]. [address] [place] [id number]   Who is doing the procedure / surgery? Ever Coronadoyunior   Date of surgery / procedure: May 29, 2025   Time of surgery / procedure: TBD   Where do you plan to recover after surgery? at home with family     Fax number for surgical facility: Note does not need to be faxed, will be available electronically in Epic.    Assessment & Plan     The proposed surgical procedure is considered INTERMEDIATE risk.    Problem List Items Addressed This Visit          Nervous and Auditory    Intractable migraine with aura without status migrainosus     Has had migraines since 2013, worsened after concussion in 2016. Started topamax 9/2024. Has made a big difference. Prior to starting was getting 2-3 migraines a week. Now maybe 1-2 times a month.   - Increase in topamax last time led to paresthesias and more brain fog  - Will decrease back to 25 mg daily         Relevant Medications    topiramate ER (QUDEXY XR) 25 MG 24 hr capsule       Respiratory    Moderate persistent asthma without complication     Diagnosed in childhood, worsened after COVID.  Currently very well-controlled and only uses her Advair inhaler as needed maybe 1-2 times a month.  - Could consider decrease dose of advair for PRN use with next fill            Digestive    Class 1 obesity with serious comorbidity and body mass index (BMI) of 34.0 to 34.9 in adult     Is doing fantastic lifestyle modifications with minimal  success. Is working out daily. Is in calorie deficit, focusing on protein, lots of fruits and veggies, no take out. Unfortunately, despite these changes, weight is not moving down, which is very frustrating. We tried to increase topamax to help last visit, but this led to increased brain fog and paresthesias.   - Decrease topamax to 25 mg daily  - Will try to start zepbound 2.5 mg weekly and titrate up, she will wait to start until after her surgery  - Continue great lifestyle modifications  - Nutrition referral  - F/up 4 months          Relevant Medications    topiramate ER (QUDEXY XR) 25 MG 24 hr capsule    tirzepatide-Weight Management (ZEPBOUND) 2.5 MG/0.5ML prefilled pen    Other Relevant Orders    Adult Nutrition  Referral       Endocrine    Mixed dyslipidemia      (2025). Weight loss recommended along with healthy diet choices and exercise, which she is doing.   - CTM            Musculoskeletal and Integumentary    Ganglion of right wrist     Removal scheduled 5/29/2025.            Other    Preop general physical exam - Primary     Here for pre-op prior to ganglion cyst removal. Chronic conditions stable. No known cardiac history (and normal TTE 3/2025).   - BMP and CBC stable  - Medication hold times below  - Approval given         Relevant Orders    Basic metabolic panel (Completed)    CBC with platelets and differential (Completed)        - No identified additional risk factors other than previously addressed    Antiplatelet or Anticoagulation Medication Instructions   - We reviewed the medication list and the patient is not on an antiplatelet or anticoagulation medications.    Additional Medication Instructions   - Antiepileptics: Continue without modification.   - LABA, inhaled corticosteroid, long-acting anticholinergics: Continue without modification.   - GLP-1 Injectable (exenitide, liraglutide, semaglutide, dulaglutide, etc.): DO NOT TAKE 7 days before surgery      Recommendation  Approval given to proceed with proposed procedure, without further diagnostic evaluation.    Follow-up  Return in about 4 months (around 9/6/2025).    Judy Raygoza is a 24 year old, presenting for the following:  Pre-Op Exam (05/29/25 [facility]- Dr. Ever Carney for cyst removal)          5/6/2025     9:07 AM   Additional Questions   Roomed by RULA Lemon     HPI: Here for pre-op prior to ganglion cyst removal later this month          5/3/2025   Pre-Op Questionnaire   Have you ever had a heart attack or stroke? No   Have you ever had surgery on your heart or blood vessels, such as a stent placement, a coronary artery bypass, or surgery on an artery in your head, neck, heart, or legs? No   Do you have chest pain with activity? No   Do you have a history of heart failure? No   Do you currently have a cold, bronchitis or symptoms of other infection? No   Do you have a cough, shortness of breath, or wheezing? No   Do you or anyone in your family have previous history of blood clots? (!) YES - Dad's Dad, no personal history    Do you or does anyone in your family have a serious bleeding problem such as prolonged bleeding following surgeries or cuts? No   Have you ever had problems with anemia or been told to take iron pills? (!) YES - in 2020, likely period related    Have you had any abnormal blood loss such as black, tarry or bloody stools, or abnormal vaginal bleeding? No   Have you ever had a blood transfusion? No   Are you willing to have a blood transfusion if it is medically needed before, during, or after your surgery? Yes   Have you or any of your relatives ever had problems with anesthesia? No   Do you have sleep apnea, excessive snoring or daytime drowsiness? No   Do you have any artifical heart valves or other implanted medical devices like a pacemaker, defibrillator, or continuous glucose monitor? No   Do you have artificial  joints? No   Are you allergic to latex? (!) YES     Migraine / obesity: last visit for migraine and weight we tried to increase topamax 25 -> 50 mg daily. Sent message 4/25 with pins and needles sensation, had her decrease back to 25 mg daily. Has been working to increase exercise/cardio (daily) and diet changes (veggies every meal, calorie deficit, no carbs). Frustrated with this.   - Now paresthesias happening a lot less   - A little increase in brain fog as well     Wt Readings from Last 4 Encounters:   05/06/25 98.1 kg (216 lb 3.2 oz)   02/12/25 97.8 kg (215 lb 11.2 oz)   12/09/24 99.3 kg (219 lb)   10/22/24 99.3 kg (219 lb)     Asthma: Advair PRN    Advance Care Planning  Discussed advance care planning with patient; informed AVS has link to [facility].    Preoperative Review of    reviewed - tramadol from last year only       Patient Active Problem List    Diagnosis Date Noted    Preop general physical exam 05/06/2025     Priority: Medium    Routine general medical examination at a health care facility 02/12/2025     Priority: Medium    Family history of cardiomyopathy 02/12/2025     Priority: Medium     TTE normal 3/2025      At high risk for breast cancer 02/12/2025     Priority: Medium    De Quervain's tenosynovitis, right 05/15/2024     Priority: Medium    Ganglion of right wrist 05/15/2024     Priority: Medium    Nut allergy 02/07/2024     Priority: Medium    Class 1 obesity with serious comorbidity and body mass index (BMI) of 34.0 to 34.9 in adult 02/07/2024     Priority: Medium    Moderate persistent asthma without complication 12/12/2022     Priority: Medium     Diagnosed in childhood, worsened after COVID.       Chronic post-COVID-19 syndrome 09/07/2022     Priority: Medium     Last Assessment & Plan:    Formatting of this note might be different from the original.   Feels that she is much improved, and continues to improve.  Last Assessment & Plan:    Formatting of this note might be  different from the original.   Acute covid 8/2022, was sick for 1 month   Referred to covid - long hauler program       UTD w/ pulmonary and referred to the formal covid long hauler program at [facility]      Has f/u with neurology , pulmonary, occupational therapy and nutritionist      Has MRI brain scheduled 1/2023      Seasonal allergic rhinitis 05/04/2022     Priority: Medium     Last Assessment & Plan:    Formatting of this note might be different from the original.   Add patanase and monitor   allegran prn      Thyromegaly 11/01/2021     Priority: Medium     Last Assessment & Plan:    Formatting of this note might be different from the original.   Monitor TSH      Other insomnia 11/01/2021     Priority: Medium     Last Assessment & Plan:    Formatting of this note might be different from the original.   Recommend good sleep habits   Sleepy time tea/  Melatonin etc      Endometriosis 03/03/2021     Priority: Medium     Formatting of this note might be different from the original.   Last Assessment & Plan:    Formatting of this note might be different from the original.   21 yo with pelvic pain here to follow up:    - States she thought about our discussion and my recommendation for trial of less invasive hormonal therapy before opting for surgical exploration    - She has decided she prefers surgery before any treatment initiation, accepting the chance that the surgery may not yield an answer   - She desires definitive diagnosis to rule in or out endometriosis with laparoscopy   - Refuses hormonal therapy at this time- admits after surgery will plan on OCP versus LARC   - Pelvic exam with mild tenderness left adnexa otherwise normal   - R/B/A of diagnostic laparoscopy reviewed - she agrees to proceed   Formatting of this note might be different from the original.   Laparoscopic fulguration and YUNIER 8/2/21    Stage 2/3 disease   Starting continuous OCP 8/2021      F/u with gyn - 12/22/2021 - considering changing to  IUD discuss with gyn       Last Assessment & Plan:    Formatting of this note might be different from the original.   Laparoscopic fulguration and YUNIER 8/2/21    Stage 2/3 disease   Starting continuous OCP 8/2021      F/u with gyn - 12/22/2021 - considering changing to IUD discuss with gyn  Last Assessment & Plan:    Formatting of this note might be different from the original.   21 yo with pelvic pain here to follow up:    - States she thought about our discussion and my recommendation for trial of less invasive hormonal therapy before opting for surgical exploration    - She has decided she prefers surgery before any treatment initiation, accepting the chance that the surgery may not yield an answer   - She desires definitive diagnosis to rule in or out endometriosis with laparoscopy   - Refuses hormonal therapy at this time- admits after surgery will plan on OCP versus LARC   - Pelvic exam with mild tenderness left adnexa otherwise normal   - R/B/A of diagnostic laparoscopy reviewed - she agrees to proceed  Formatting of this note might be different from the original.   Laparoscopic fulguration and YUNIER 8/2/21    Stage 2/3 disease   Starting continuous OCP 8/2021      F/u with gyn - 12/22/2021 - considering changing to IUD discuss with gyn       Last Assessment & Plan:    Formatting of this note might be different from the original.   Laparoscopic fulguration and YUNIER 8/2/21    Stage 2/3 disease   Starting continuous OCP 8/2021      F/u with gyn - 12/22/2021 - considering changing to IUD discuss with gyn      Mixed dyslipidemia 11/25/2020     Priority: Medium     Formatting of this note might be different from the original.   Recommend low fat diet and exercise as tolerated.  Total cholesterol goal is <200 and LDL goal is <100 and TG goal is <150.       Labs 11/2/2020 =  and       Increase fiber and add fish oil OTC      Labs 5/5/2022 ,  and hDL 60      Repeat fasting lipids in 6 months w/  diet and exercise       Last Assessment & Plan:    Formatting of this note might be different from the original.   Recommend low fat diet and exercise as tolerated.  Total cholesterol goal is <200 and LDL goal is <100 and TG goal is <150.       Labs 11/2/2020 =  and       Increase fiber and add fish oil OTC      Labs 5/5/2022 ,  and hDL 60      Repeat fasting lipids in 6 months w/ diet and exercise      Intractable migraine with aura without status migrainosus 10/28/2020     Priority: Medium     Has had migraines since 2013, worsened after concussion in 2016.      Chondromalacia patellae, left knee 10/28/2020     Priority: Medium    Discogenic cervical pain 10/28/2020     Priority: Medium    Spondylolysis of lumbosacral region 02/23/2019     Priority: Medium      Past Medical History:   Diagnosis Date    Anemia, iron deficiency     COVID-19 long hauler manifesting chronic decreased mobility and endurance 12/12/2022    Last Assessment & Plan:    Formatting of this note might be different from the original.   Reports that she no longer struggles with exertional tolerance.  She completed a 12-week power weightlifting program and did well.  Formatting of this note might be different from the original.   Last Assessment & Plan:    Formatting of this note might be different from the original.   OT referral. Screen.      Endometriosis 08/2021    documented by photo/visualization.  no biopsy performed.    Hemorrhagic ovarian cyst 03/03/2021    Last Assessment & Plan:    Formatting of this note is different from the original.   +acute pelvic pain with +abnormal CT scan       CT abdomen/pelvis = +?ileitis and + hemorrhagic left ovarian cyst, - since ovarian cyst has ruptured and no longer seen on US   Rest/ NSAIDS PRN    Hydrate       US of 5/18/2021 is unremarkable. No ovarian cyst seen. Patient is following up with surgeon to do explora    Nut allergy     Obese     Other iron deficiency anemia  02/07/2024    Reactive airway disease 09/07/2022    Last Assessment & Plan:    Formatting of this note might be different from the original.   Hx of asthma since 3 yrs old      Restart advair 250/50 ordered 9/7/2022   Restart decadron 6 mg daily x 5 days   Refill the duo-nebs prn   Refill the proair       CXR negative 9/6/202 and engative 8/31/2022    F/u in office w/in 4-6 weeks      Thyroid disease     Uncomplicated asthma      Past Surgical History:   Procedure Laterality Date    ABDOMEN SURGERY  2022    Laproscopic    BIOPSY BREAST Bilateral 08/01/2024    Procedure: SUBAREOLAR DUCT EXCISION;  Surgeon: Indira Amos DO;  Location: [facility]    BREAST SURGERY  2024    LAPAROSCOPY DIAGNOSTIC (GYN)  08/2021    Surgery to remove endometriosis unable to remove all. Some on colon    TONSILLECTOMY & ADENOIDECTOMY  05/2023     Current Outpatient Medications   Medication Sig Dispense Refill    albuterol (PROAIR HFA/PROVENTIL HFA/VENTOLIN HFA) 108 (90 Base) MCG/ACT inhaler Inhale 2 puffs into the lungs every 6 hours as needed for shortness of breath, wheezing or cough 18 g 0    albuterol (PROVENTIL) (2.5 MG/3ML) 0.083% neb solution TAKE 3 ML (2.5 MG) EVERY 6 HOURS AS NEEDED BY NEBULIZATION FOR WHEEZING FOR UP TO 14 DAYS      EPINEPHrine (ANY BX GENERIC EQUIV) 0.3 MG/0.3ML injection 2-pack Inject 0.3 mLs (0.3 mg) into the muscle as needed for anaphylaxis 2 each 1    fluticasone-salmeterol (ADVAIR) 500-50 MCG/ACT inhaler Inhale 1 puff into the lungs 2 times daily. Uses PRN      ondansetron (ZOFRAN ODT) 4 MG ODT tab Take 1 tablet (4 mg) by mouth every 8 hours as needed for nausea. 30 tablet 1    tirzepatide-Weight Management (ZEPBOUND) 2.5 MG/0.5ML prefilled pen Inject 0.5 mLs (2.5 mg) subcutaneously every 7 days. 2 mL 0    topiramate ER (QUDEXY XR) 25 MG 24 hr capsule Take 1 capsule (25 mg) by mouth daily. 90 capsule 1       Allergies   Allergen Reactions    Cat Dander Shortness Of Breath    Vitex Swelling      "Hazelnut tree allergy skin test    Bactrim [Sulfamethoxazole-Trimethoprim]     Elemental Sulfur     Penicillins     Sulfa Antibiotics Nausea and Vomiting    Tree Extract      Hazelnut tree allergy skin test    Tree Nuts [Nuts]     Alternaria Alternata Allergy Skin Test Rash    Dog Epithelium (Canis Lupus Familiaris) Itching    Latex Rash    Mold Cough        Social History     Tobacco Use    Smoking status: Never    Smokeless tobacco: Never   Substance Use Topics    Alcohol use: Not Currently     Family History   Problem Relation Age of Onset    Cardiomyopathy Father     Hypertension Maternal Grandmother     Hyperlipidemia Maternal Grandfather     Substance Abuse Paternal Grandmother     Cancer Paternal Grandmother     Hyperlipidemia Paternal Grandfather     Breast Cancer Other         Paternal aunts     History   Drug Use    Types: Marijuana             Review of Systems  Constitutional, neuro, ENT, endocrine, pulmonary, cardiac, gastrointestinal, genitourinary, musculoskeletal, integument and psychiatric systems are negative, except as otherwise noted.    Objective    /86   Pulse 87   Temp 98.4  F (36.9  C) (Oral)   Resp 20   Ht 1.676 m (5' 6\")   Wt 98.1 kg (216 lb 3.2 oz)   SpO2 99%   BMI 34.90 kg/m     Estimated body mass index is 34.9 kg/m  as calculated from the following:    Height as of this encounter: 1.676 m (5' 6\").    Weight as of this encounter: 98.1 kg (216 lb 3.2 oz).  Physical Exam  GENERAL: alert and no distress  EYES: Eyes grossly normal to inspection and conjunctivae and sclerae normal  HENT: nose and mouth without ulcers or lesions  RESP: lungs clear to auscultation - no rales, rhonchi or wheezes  CV: regular rate and rhythm, normal S1 S2, no S3 or S4, no murmur, click or rub, no peripheral edema  ABDOMEN: soft, nontender  MS: +cyst on R wrist, no edema  SKIN: no suspicious lesions or rashes on exposed skin  NEURO: Normal strength and tone, mentation intact and speech normal  PSYCH: " mentation appears normal, affect normal/bright    Recent Labs   Lab Test 02/12/25  0838 12/09/24  0744 05/22/24  1305   HGB 13.7  --  13.5     --  409    139 137   POTASSIUM 4.6 4.4 4.2   CR 0.80 0.75 0.65   A1C 5.0  --  5.2        Diagnostics  Recent Results (from the past 24 hours)   Basic metabolic panel    Collection Time: 05/06/25  9:56 AM   Result Value Ref Range    Sodium 137 135 - 145 mmol/L    Potassium 4.6 3.4 - 5.3 mmol/L    Chloride 104 98 - 107 mmol/L    Carbon Dioxide (CO2) 24 22 - 29 mmol/L    Anion Gap 9 7 - 15 mmol/L    Urea Nitrogen 12.6 6.0 - 20.0 mg/dL    Creatinine 0.79 0.51 - 0.95 mg/dL    GFR Estimate >90 >60 mL/min/1.73m2    Calcium 9.8 8.8 - 10.4 mg/dL    Glucose 93 70 - 99 mg/dL   CBC with platelets and differential    Collection Time: 05/06/25  9:56 AM   Result Value Ref Range    WBC Count 13.0 (H) 4.0 - 11.0 10e3/uL    RBC Count 4.80 3.80 - 5.20 10e6/uL    Hemoglobin 14.0 11.7 - 15.7 g/dL    Hematocrit 41.1 35.0 - 47.0 %    MCV 86 78 - 100 fL    MCH 29.2 26.5 - 33.0 pg    MCHC 34.1 31.5 - 36.5 g/dL    RDW 13.1 10.0 - 15.0 %    Platelet Count 372 150 - 450 10e3/uL    % Neutrophils 66 %    % Lymphocytes 25 %    % Monocytes 7 %    % Eosinophils 2 %    % Basophils 0 %    % Immature Granulocytes 0 %    Absolute Neutrophils 8.6 (H) 1.6 - 8.3 10e3/uL    Absolute Lymphocytes 3.3 0.8 - 5.3 10e3/uL    Absolute Monocytes 0.8 0.0 - 1.3 10e3/uL    Absolute Eosinophils 0.3 0.0 - 0.7 10e3/uL    Absolute Basophils 0.1 0.0 - 0.2 10e3/uL    Absolute Immature Granulocytes 0.0 <=0.4 10e3/uL      No EKG required, no history of coronary heart disease, significant arrhythmia, peripheral arterial disease or other structural heart disease.    Revised Cardiac Risk Index (RCRI)  The patient has the following serious cardiovascular risks for perioperative complications:   - No serious cardiac risks = 0 points     RCRI Interpretation: 0 points: Class I (very low risk - 0.4% complication rate)          Signed Electronically by: Megan Kyle MD  A copy of this evaluation report is provided to the requesting physician.

## 2025-05-06 NOTE — ASSESSMENT & PLAN NOTE
Is doing fantastic lifestyle modifications with minimal success. Is working out daily. Is in calorie deficit, focusing on protein, lots of fruits and veggies, no take out. Unfortunately, despite these changes, weight is not moving down, which is very frustrating. We tried to increase topamax to help last visit, but this led to increased brain fog and paresthesias.   - Decrease topamax to 25 mg daily  - Will try to start zepbound 2.5 mg weekly and titrate up, she will wait to start until after her surgery  - Continue great lifestyle modifications  - Nutrition referral  - F/up 4 months

## 2025-05-06 NOTE — ASSESSMENT & PLAN NOTE
(2025). Weight loss recommended along with healthy diet choices and exercise, which she is doing.   - CTM

## 2025-05-06 NOTE — PATIENT INSTRUCTIONS
How to Take Your Medication Before Surgery  Preoperative Medication Instructions   Antiplatelet or Anticoagulation Medication Instructions   - We reviewed the medication list and the patient is not on an antiplatelet or anticoagulation medications.    Additional Medication Instructions   - Topamax: Skip morning of surgery .   - GLP-1 Injectable (exenitide, liraglutide, semaglutide, dulaglutide, etc.): DO NOT TAKE 7 days before surgery   - Stop Vitamins 2 weeks before       Zepbound instructions:   - Start 2.5 mg weekly  - After 3rd week, send me a message with how things are going and if we should keep the dose the same for the next month or go up  - Can increase every 4 weeks by 2.5 mg to max of 15 mg     Patient Education   Preparing for Your Surgery  For Adults  Getting started  In most cases, a nurse will call to review your health history and instructions. They will give you an arrival time based on your scheduled surgery time. Please be ready to share:  Your doctor's clinic name and phone number  Your medical, surgical, and anesthesia history  A list of allergies and sensitivities  A list of medicines, including herbal treatments and over-the-counter drugs  Whether the patient has a legal guardian (ask how to send us the papers in advance)  Note: You may not receive a call if you were seen at our PAC (Preoperative Assessment Center).  Please tell us if you're pregnant--or if there's any chance you might be pregnant. Some surgeries may injure a fetus (unborn baby), so they require a pregnancy test. Surgeries that are safe for a fetus don't always need a test, and you can choose whether to have one.   Preparing for surgery  Within 10 to 30 days of surgery: Have a pre-op exam (sometimes called an H&P, or History and Physical). This can be done at a clinic or pre-operative center.  If you're having a , you may not need this exam. Talk to your care team.  At your pre-op exam, talk to your care team about  all medicines you take. (This includes CBD oil and any drugs, such as THC, marijuana, and other forms of cannabis.) If you need to stop any medicine before surgery, ask when to start taking it again.  This is for your safety. Many medicines and drugs can make you bleed too much during surgery. Some change how well surgery (anesthesia) drugs work.  Call your insurance company to let them know you're having surgery. (If you don't have insurance, call [phone].)  Call your clinic if there's any change in your health. This includes a scrape or scratch near the surgery site, or any signs of a cold (sore throat, runny nose, cough, rash, fever).  Eating and drinking guidelines  For your safety: Unless your surgeon tells you otherwise, follow the guidelines below.  Eat and drink as normal until 8 hours before you arrive for surgery. After that, no food or milk. You can spit out gum when you arrive.  Drink clear liquids until 2 hours before you arrive. These are liquids you can see through, like water, Gatorade, and Propel Water. They also include plain black coffee and tea (no cream or milk).  No alcohol for 24 hours before you arrive. The night before surgery, stop any drinks that contain THC.  If your care team tells you to take medicine on the morning of surgery, it's okay to take it with a sip of water. No other medicines or drugs are allowed (including CBD oil)--follow your care team's instructions.  If you have questions the day of surgery, call your hospital or surgery center.   Preventing infection  Shower or bathe the night before and the morning of surgery. Follow the instructions your clinic gave you. (If no instructions, use regular soap.)  Don't shave or clip hair near your surgery site. We'll remove the hair if needed.  Don't smoke or vape the morning of surgery. No chewing tobacco for 6 hours before you arrive. A nicotine patch is okay. You may spit out nicotine gum when you arrive.  For some surgeries,  the surgeon will tell you to fully quit smoking and nicotine.  We will make every effort to keep you safe from infection. We will:  Clean our hands often with soap and water (or an alcohol-based hand rub).  Clean the skin at your surgery site with a special soap that kills germs.  Give you a special gown to keep you warm. (Cold raises the risk of infection.)  Wear hair covers, masks, gowns, and gloves during surgery.  Give antibiotic medicine, if prescribed. Not all surgeries need this medicine.  What to bring on the day of surgery  Photo ID and insurance card  Copy of your health care directive, if you have one  Glasses and hearing aids (bring cases)  You can't wear contacts during surgery  Inhaler and eye drops, if you use them (tell us about these when you arrive)  CPAP machine or breathing device, if you use them  A few personal items, if spending the night  If you have . . .  A pacemaker, ICD (cardiac defibrillator), or other implant: Bring the ID card.  An implanted stimulator: Bring the remote control.  A legal guardian: Bring a copy of the certified (court-stamped) guardianship papers.  Please remove any jewelry, including body piercings. Leave jewelry and other valuables at home.  If you're going home the day of surgery  You must have a responsible adult drive you home. They should stay with you overnight as well.  If you don't have someone to stay with you, and you aren't safe to go home alone, we may keep you overnight. Insurance often won't pay for this.  After surgery  If it's hard to control your pain or you need more pain medicine, please call your surgeon's office.  Questions?   If you have any questions for your care team, list them here:   ____________________________________________________________________________________________________________________________________________________________________________________________________________________________________________________________  For  informational purposes only. Not to replace the advice of your health care provider. Copyright   2003, 2019 [employer] [facility]. All rights reserved. Clinically reviewed by Giovanny Camara MD. [facility] [facility] [id number] - REV 08/24.

## 2025-05-06 NOTE — ASSESSMENT & PLAN NOTE
Has had migraines since 2013, worsened after concussion in 2016. Started topamax 9/2024. Has made a big difference. Prior to starting was getting 2-3 migraines a week. Now maybe 1-2 times a month.   - Increase in topamax last time led to paresthesias and more brain fog  - Will decrease back to 25 mg daily

## 2025-05-07 ENCOUNTER — PATIENT OUTREACH (OUTPATIENT)
Dept: CARE COORDINATION | Facility: CLINIC | Age: 25
End: 2025-05-07
Payer: COMMERCIAL

## 2025-05-07 ENCOUNTER — TELEPHONE (OUTPATIENT)
Dept: ORTHOPEDICS | Facility: CLINIC | Age: 25
End: 2025-05-07
Payer: COMMERCIAL

## 2025-05-07 NOTE — TELEPHONE ENCOUNTER
Other: Patient calling because she had her pre op and blood work completed yesterday and wanted to check if there was anything more she needs to do before surgery. Yesterday they had mentioned she should reach out to her insurance about the upcoming surgery and she is wonder if this is necessary. Please contact her back.      Could we send this information to you in [employer] or would you prefer to receive a phone call?:   No preference   Okay to leave a detailed message?: Yes at Cell number on file:    Telephone Information:   Mobile [phone]

## 2025-05-07 NOTE — TELEPHONE ENCOUNTER
Insurance coverage verified by our financial team. Relayed to Pt. Also discussed post op restrictions and healing timelines. All questions answered at this time.    Nicola Avilez RNCC

## 2025-05-12 ENCOUNTER — HOSPITAL ENCOUNTER (OUTPATIENT)
Dept: NUTRITION | Facility: HOSPITAL | Age: 25
Discharge: HOME | End: 2025-05-12
Attending: INTERNAL MEDICINE | Admitting: INTERNAL MEDICINE
Payer: COMMERCIAL

## 2025-05-12 DIAGNOSIS — E66.811 CLASS 1 OBESITY WITH SERIOUS COMORBIDITY AND BODY MASS INDEX (BMI) OF 34.0 TO 34.9 IN ADULT, UNSPECIFIED OBESITY TYPE: ICD-10-CM

## 2025-05-12 PROCEDURE — 97802 MEDICAL NUTRITION INDIV IN: CPT | Performed by: DIETITIAN, REGISTERED

## 2025-05-12 NOTE — PROGRESS NOTES
"[facility]  Medical Nutrition Therapy    Visit Type: Initial Assessment    Idalmis Swann, 24 year old referred by Megan Kyle MD for MNT related to E66.811, Z68.34 (ICD-10-CM) - Class 1 obesity with serious comorbidity and body mass index (BMI) of 34.0 to 34.9 in adult, unspecified obesity type         Nutrition Assessment:  Anthropometrics  Height:   Ht Readings from Last 1 Encounters:   05/06/25 1.676 m (5' 6\")         BMI:  34.9   Weight Status:  Obesity Grade I BMI 30-34.9   Weight:   Wt Readings from Last 1 Encounters:   05/06/25 98.1 kg (216 lb 3.2 oz)       UBW: 216 lb      IBW:  59 kg (130 lb) IBW %: 166%        Weight History:   Wt Readings from Last 20 Encounters:   05/06/25 98.1 kg (216 lb 3.2 oz)   02/12/25 97.8 kg (215 lb 11.2 oz)   12/09/24 99.3 kg (219 lb)   10/22/24 99.3 kg (219 lb)   10/02/24 99.1 kg (218 lb 8 oz)   09/24/24 99.7 kg (219 lb 11.2 oz)   08/01/24 98 kg (216 lb)   07/17/24 97.5 kg (215 lb)   05/22/24 99.8 kg (220 lb)   05/22/24 100.1 kg (220 lb 9.6 oz)   03/06/24 98.3 kg (216 lb 12.8 oz)   02/07/24 99.5 kg (219 lb 6.4 oz)   -Patient reports her weight at home weekly   -Moved here in Aug. 2023 and was 196 lb then.   -Jan. 2021 was 178 lb   -Dec. 2022 was 189 lb     Goal Weight:   -165 lb     Nutrition History    PMH:   Patient Active Problem List   Diagnosis    Endometriosis    Nut allergy    Intractable migraine with aura without status migrainosus    Mixed dyslipidemia    Thyromegaly    Seasonal allergic rhinitis    Chronic post-COVID-19 syndrome    Chondromalacia patellae, left knee    Discogenic cervical pain    Other insomnia    Spondylolysis of lumbosacral region    Class 1 obesity with serious comorbidity and body mass index (BMI) of 34.0 to 34.9 in adult    De Quervain's tenosynovitis, right    Ganglion of right wrist    Moderate persistent asthma without complication    Routine general medical examination at a health care facility    Family " history of cardiomyopathy    At high risk for breast cancer    Preop general physical exam      -Has always been active - sports, biking, cardio activities   -Has high cortisol levels   -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        Meds:   Current Outpatient Medications   Medication Instructions    albuterol (PROAIR HFA/PROVENTIL HFA/VENTOLIN HFA) 108 (90 Base) MCG/ACT inhaler 2 puffs, Inhalation, EVERY 6 HOURS PRN    albuterol (PROVENTIL) (2.5 MG/3ML) 0.083% neb solution TAKE 3 ML (2.5 MG) EVERY 6 HOURS AS NEEDED BY NEBULIZATION FOR WHEEZING FOR UP TO 14 DAYS    EPINEPHrine (ANY BX GENERIC EQUIV) 0.3 mg, Intramuscular, PRN    fluticasone-salmeterol (ADVAIR) 500-50 MCG/ACT inhaler 1 puff, 2 TIMES DAILY    ondansetron (ZOFRAN ODT) 4 mg, Oral, EVERY 8 HOURS PRN    tirzepatide-Weight Management (ZEPBOUND) 2.5 mg, Subcutaneous, EVERY 7 DAYS    topiramate ER (QUDEXY XR) 25 mg, Oral, DAILY    -Hasn't started Zepbound yet.     Supplements: reviewed      Social/Environmental:   -Average sleep per night: 10 hours per night   -Stress levels: doesn't get stressed easily but has high cortisol marks on abdomen (endocrinologist told her the marks are due to elevated cortisol)  -Flies on planes for trips home and for work - 1-2 times per month     Food Record  Breakfast: 7:30 am: Smoothie (2% milk or lactose free milk, blueberries, 1/2 banana, 1 Tb gege seed, 1 Tb non fat plain Greek yogurt)  Snack: none  Lunch: 11:45-12:15 pm: Grilled chicken, broccoli, sweet potatoes homemade OR tuna fish OR sandwich with sun butter and jelly on whole wheat bread OR 1/4 cup brown  rice, chicken, mush, zucchini, broccoli   Snack: Blueberries or Oikos Triple Zero Yogurt OR skinny pop and a banana   Dinner: 5:45-7:30 pm: Egg two scrambled, toast, avocado OR mini bagel OR steak, broccoli, sweet potatoes, mushrooms OR shrimp tacos OR corn tortillas, avocado, shrimp   Snack: none or cucumber with hummus (homemade with lemon juice and sunbutter, salt, pepper), mini Rice Krispies treats and That's It bars OR Made Good granola minis or [place] Oatmeal square, eats Combos on the plane   Beverages: water, coffee in the morning and sometimes a mini Coke or seltzer   -Take-out never   -Cooks with avocado oil     Nutritional Details:   -Food allergies: nuts, tree nuts, all nuts, flaxseeds  -Food intolerances: none   -Food sensitivities: none but tries to stay away from nightshades   -GI concerns: Diarrhea a few times per week -unsure what causes it   -Appetite: good   -Pace of eating: slow eater   -Role of cooking: self  -Role of food shopping: self   -Gets nausea out of the blue sometimes due to low BG levels - always has snacks       Physical Activity:  -Exercises every day   -Friday bikes to work   -Tracks steps and get in over 10,000 steps per day   -30 min of weights 2-3 times per week and cardio every day for 1 hour - walking (4 miles per day), running, and biking   -Has been consistently exercising for a few years.      ASSESSED MALNUTRITION STATUS  % Weight Loss:  None noted  % Intake:  No decreased intake noted  Subcutaneous Fat Loss:  None observed  Loss of Muscle Mass:  None observed  Fluid Retention:  None noted    Malnutrition Diagnosis:  Patient does not meet two of the above criteria necessary for diagnosing malnutrition        Nutrition Diagnosis:  Food and nutrition-related knowledge deficit related to limited prior exposure to nutrition related information as evidenced by report of difficulty losing weight, BMI 34.9, high activity level, and no prior dietitian visits.          Nutrition Intervention:  Nutrition Prescription Summary: MNT for Weight Management and Heart Health     Nutrition Education (Content):  -Educated pt on using MyPlate for meal planning and discussed portion sizes   -Recommended the Mediterranean Style Diet   -Discussed recommended and not recommended foods (choosing WGs, lean meats, low-fat dairy, fresh fruits & veggies, unsat fats, and limiting high-sodium and refined sugar foods)  -Educated pt on metabolism and used the fire analogy; talked about the importance of consuming consistent meals/snacks throughout the day and listening to hunger/fullness cues   -Discussed healthy snack options- protein+complex CHO  -Educated pt on reading food labels  -Encouraged pt to drink more water throughout the day and explained the importance of hydration.   -Encouraged pt to increase daily PA- include cardiovascular activities w/ultimate goal of 60 min per day     Emailed/mailed information discussed including nutrition handouts to patient.       Nutrition Prescription: Macronutrient and Micronutrient details   Dosing weight: Current wt (98 kg) for energy and fluids, IBW (59 kg) for protein   Energy: 2,000 kcals/day ([place] St Jeor)    Justification:  (obesity, to promote a 1.5 lb wt loss per week)   Protein: 60-72 g Pro/day (1-1.2 g pro/Kg)    Justification:  (obesity guidelines )   Fluid: 2,000 mL/day   (1 mL/Kcal)     Justification:  (obese)   Fiber:     Women (19-50 years): 25 grams per day          Carbohydrate: 45-65% kcal   <25 g added sugar/day       Fat: 20-35% kcal  <7% kcal from saturated fat   Micronutrient: DRI  <2,300 mg sodium/day            Vitamin and Mineral Supplements: n/a       Patient Engagement:   Assessed learning needs and learning preference: Yes.  Teaching Method(s) used: Explanation    Nutrition Education (Application):  a)  Discussed current eating plans / recommended alternative food choices    b)  Patient verbalizes understanding of diet by  asking questions, goal setting      Anticipate >75% compliance   Stage of Change:  action      Nutrition Goals:  Eat 6 mini meals per day   Track calories and aim for 2,000 calories per day and 72 g of protein per day (Cronometer). Carbs should be 45-60% calories, fats 20-30% calories from fat   Check weight weekly goal is 1-2 lb weight loss per week   Continue to stay active aiming for 60 min of exercise 5-6 days per week     Nutrition Follow Up / Monitoring:   Weight, PO intake, PA, labs (lipid panel)      Nutrition Recommendations:  Patient to follow-up with RD in 12 weeks.  Patient has RD contact information to call/email if needed.      Start time: 12:50  End time: 14:00    Total Time Duration: 70 min      Sylvia Stauffer MS, RD, LD, [facility]  Clinical Dietitian  [phone]

## 2025-05-28 ENCOUNTER — ANESTHESIA EVENT (OUTPATIENT)
Dept: SURGERY | Facility: AMBULATORY SURGERY CENTER | Age: 25
End: 2025-05-28
Payer: COMMERCIAL

## 2025-05-28 ENCOUNTER — TELEPHONE (OUTPATIENT)
Dept: ORTHOPEDICS | Facility: CLINIC | Age: 25
End: 2025-05-28
Payer: COMMERCIAL

## 2025-05-28 RX ORDER — OXYCODONE HYDROCHLORIDE 5 MG/1
10 TABLET ORAL
Refills: 0 | OUTPATIENT
Start: 2025-05-28

## 2025-05-28 RX ORDER — NALOXONE HYDROCHLORIDE 0.4 MG/ML
0.1 INJECTION, SOLUTION INTRAMUSCULAR; INTRAVENOUS; SUBCUTANEOUS
OUTPATIENT
Start: 2025-05-28

## 2025-05-28 RX ORDER — OXYCODONE HYDROCHLORIDE 5 MG/1
5 TABLET ORAL
Refills: 0 | OUTPATIENT
Start: 2025-05-28

## 2025-05-28 RX ORDER — FENTANYL CITRATE 50 UG/ML
25 INJECTION, SOLUTION INTRAMUSCULAR; INTRAVENOUS
Refills: 0 | OUTPATIENT
Start: 2025-05-28

## 2025-05-28 RX ORDER — ONDANSETRON 4 MG/1
4 TABLET, ORALLY DISINTEGRATING ORAL EVERY 30 MIN PRN
OUTPATIENT
Start: 2025-05-28

## 2025-05-28 RX ORDER — ONDANSETRON 2 MG/ML
4 INJECTION INTRAMUSCULAR; INTRAVENOUS EVERY 30 MIN PRN
OUTPATIENT
Start: 2025-05-28

## 2025-05-28 RX ORDER — DEXAMETHASONE SODIUM PHOSPHATE 10 MG/ML
4 INJECTION, SOLUTION INTRAMUSCULAR; INTRAVENOUS
OUTPATIENT
Start: 2025-05-28

## 2025-05-28 NOTE — TELEPHONE ENCOUNTER
ATC spoke with an RN. No shaving 3 days prior to surgery. ATC called pt and relayed this to her. Pt verified understanding.         -BRENDA Menon- [facility]

## 2025-05-28 NOTE — TELEPHONE ENCOUNTER
Other: Requesting c/b- would like to know if ok to shave before using the surgery soap     Could we send this information to you in [place] or would you prefer to receive a phone call?:   Patient would prefer a phone call   Okay to leave a detailed message?: No at Cell number on file:    Telephone Information:   Mobile [phone]

## 2025-05-28 NOTE — ANESTHESIA PREPROCEDURE EVALUATION
Anesthesia Pre-Procedure Evaluation    Patient: Idalmis Swann   MRN: [id number] : 2000          Procedure : Procedure(s):  RIGHT dorsal wrist mass excisional biopsy         Past Medical History:   Diagnosis Date    Anemia, iron deficiency     COVID-19 long hauler manifesting chronic decreased mobility and endurance 2022    Last Assessment & Plan:    Formatting of this note might be different from the original.   Reports that she no longer struggles with exertional tolerance.  She completed a 12-week power weightlifting program and did well.  Formatting of this note might be different from the original.   Last Assessment & Plan:    Formatting of this note might be different from the original.   OT referral. Screen.      Endometriosis 2021    documented by photo/visualization.  no biopsy performed.    Hemorrhagic ovarian cyst 2021    Last Assessment & Plan:    Formatting of this note is different from the original.   +acute pelvic pain with +abnormal CT scan       CT abdomen/pelvis = +?ileitis and + hemorrhagic left ovarian cyst, - since ovarian cyst has ruptured and no longer seen on US   Rest/ NSAIDS PRN    Hydrate       US of 2021 is unremarkable. No ovarian cyst seen. Patient is following up with surgeon to do explora    Nut allergy     Obese     Other iron deficiency anemia 2024    Reactive airway disease 2022    Last Assessment & Plan:    Formatting of this note might be different from the original.   Hx of asthma since 3 yrs old      Restart advair 250/50 ordered 2022   Restart decadron 6 mg daily x 5 days   Refill the duo-nebs prn   Refill the proair       CXR negative  and engative 2022    F/u in office w/in 4-6 weeks      Thyroid disease     Uncomplicated asthma       Past Surgical History:   Procedure Laterality Date    ABDOMEN SURGERY      Laproscopic    BIOPSY BREAST Bilateral 2024    Procedure: SUBAREOLAR DUCT EXCISION;  Surgeon:  Indira Amos, ;  Location: [facility]    BREAST SURGERY  2024    LAPAROSCOPY DIAGNOSTIC (GYN)  08/2021    Surgery to remove endometriosis unable to remove all. Some on colon    TONSILLECTOMY & ADENOIDECTOMY  05/2023      Allergies   Allergen Reactions    Cat Dander Shortness Of Breath    Vitex Swelling     Hazelnut tree allergy skin test    Bactrim [Sulfamethoxazole-Trimethoprim]     Elemental Sulfur     Penicillins     Sulfa Antibiotics Nausea and Vomiting    Tree Extract      Hazelnut tree allergy skin test    Tree Nuts [Nuts]     Alternaria Alternata Allergy Skin Test Rash    Dog Epithelium (Canis Lupus Familiaris) Itching    Latex Rash    Mold Cough      Social History     Tobacco Use    Smoking status: Never    Smokeless tobacco: Never   Substance Use Topics    Alcohol use: Not Currently      Wt Readings from Last 1 Encounters:   05/06/25 98.1 kg (216 lb 3.2 oz)        Anesthesia Evaluation            ROS/MED HX  ENT/Pulmonary:     (+)                      asthma                  Neurologic:     (+)      migraines,                          Cardiovascular:     (+) Dyslipidemia - -   -  - -                                      METS/Exercise Tolerance:     Hematologic:  - neg hematologic  ROS     Musculoskeletal:  - neg musculoskeletal ROS     GI/Hepatic:  - neg GI/hepatic ROS     Renal/Genitourinary:  - neg Renal ROS     Endo:     (+)               Obesity,       Psychiatric/Substance Use:  - neg psychiatric ROS     Infectious Disease:       Malignancy:  - neg malignancy ROS     Other:              Physical Exam  Airway  Cardiovascular - normal exam   Dental   (+) Minor Abnormalities - some fillings, tiny chips      Pulmonary - normal exam      Neurological - normal exam  She appears awake, alert and oriented x3.    Other Findings       OUTSIDE LABS:  CBC:   Lab Results   Component Value Date    WBC 13.0 (H) 05/06/2025    WBC 13.3 (H) 02/12/2025    HGB 14.0 05/06/2025    HGB 13.7 02/12/2025    HCT 41.1  "05/06/2025    HCT 40.2 02/12/2025     05/06/2025     02/12/2025     BMP:   Lab Results   Component Value Date     05/06/2025     02/12/2025    POTASSIUM 4.6 05/06/2025    POTASSIUM 4.6 02/12/2025    CHLORIDE 104 05/06/2025    CHLORIDE 105 02/12/2025    CO2 24 05/06/2025    CO2 24 02/12/2025    BUN 12.6 05/06/2025    BUN 13.1 02/12/2025    CR 0.79 05/06/2025    CR 0.80 02/12/2025    GLC 93 05/06/2025    GLC 90 02/12/2025     COAGS: No results found for: \"PTT\", \"INR\", \"FIBR\"  POC: No results found for: \"BGM\", \"HCG\", \"HCGS\"  HEPATIC:   Lab Results   Component Value Date    ALBUMIN 4.3 02/12/2025    PROTTOTAL 7.3 02/12/2025    ALT 44 02/12/2025    AST 30 02/12/2025    ALKPHOS 93 02/12/2025    BILITOTAL 0.2 02/12/2025     OTHER:   Lab Results   Component Value Date    A1C 5.0 02/12/2025    SUNDAR 9.8 05/06/2025    MAG 2.1 05/22/2024    TSH 2.23 05/22/2024    T3 193 05/22/2024       Anesthesia Plan    ASA Status:  2      NPO Status: NPO Appropriate   Anesthesia Type: MAC.  Airway: natural airway.  Maintenance: TIVA.   Techniques and Equipment:       - Monitoring Plan: standard ASA monitoring     Consents    Anesthesia Plan(s) and associated risks, benefits, and realistic alternatives discussed. Questions answered and patient/representative(s) expressed understanding.     - Discussed:     - Discussed with:  Patient               Postoperative Care         Comments:                   Conor Guzman MD    I have reviewed the pertinent notes and labs in the chart from the past 30 days and (re)examined the patient.  Any updates or changes from those notes are reflected in this note.    Clinically Significant Risk Factors Present on Admission                             # Obesity: Estimated body mass index is 34.9 kg/m  as calculated from the following:    Height as of 5/6/25: 1.676 m (5' 6\").    Weight as of 5/6/25: 98.1 kg (216 lb 3.2 oz).       # Asthma: noted on problem list              "

## 2025-05-29 ENCOUNTER — HOSPITAL ENCOUNTER (OUTPATIENT)
Facility: AMBULATORY SURGERY CENTER | Age: 25
End: 2025-05-29
Attending: STUDENT IN AN ORGANIZED HEALTH CARE EDUCATION/TRAINING PROGRAM
Payer: COMMERCIAL

## 2025-05-29 ENCOUNTER — ANESTHESIA (OUTPATIENT)
Dept: SURGERY | Facility: AMBULATORY SURGERY CENTER | Age: 25
End: 2025-05-29
Payer: COMMERCIAL

## 2025-05-29 VITALS
OXYGEN SATURATION: 98 % | BODY MASS INDEX: 34.07 KG/M2 | TEMPERATURE: 97.4 F | HEART RATE: 70 BPM | SYSTOLIC BLOOD PRESSURE: 131 MMHG | HEIGHT: 66 IN | DIASTOLIC BLOOD PRESSURE: 67 MMHG | RESPIRATION RATE: 16 BRPM | WEIGHT: 212 LBS

## 2025-05-29 DIAGNOSIS — M67.431 GANGLION OF RIGHT WRIST: Primary | ICD-10-CM

## 2025-05-29 LAB
HCG UR QL: NEGATIVE
INTERNAL QC OK POCT: NORMAL
POCT KIT EXPIRATION DATE: NORMAL
POCT KIT LOT NUMBER: NORMAL

## 2025-05-29 PROCEDURE — 88304 TISSUE EXAM BY PATHOLOGIST: CPT | Mod: TC | Performed by: STUDENT IN AN ORGANIZED HEALTH CARE EDUCATION/TRAINING PROGRAM

## 2025-05-29 PROCEDURE — 88342 IMHCHEM/IMCYTCHM 1ST ANTB: CPT | Mod: 26 | Performed by: PATHOLOGY

## 2025-05-29 PROCEDURE — 88304 TISSUE EXAM BY PATHOLOGIST: CPT | Mod: 26 | Performed by: PATHOLOGY

## 2025-05-29 RX ORDER — LIDOCAINE 40 MG/G
CREAM TOPICAL
Status: ACTIVE | OUTPATIENT
Start: 2025-05-29

## 2025-05-29 RX ORDER — LIDOCAINE HYDROCHLORIDE 20 MG/ML
INJECTION, SOLUTION INFILTRATION; PERINEURAL PRN
Status: DISCONTINUED | OUTPATIENT
Start: 2025-05-29 | End: 2025-05-29

## 2025-05-29 RX ORDER — ONDANSETRON 2 MG/ML
INJECTION INTRAMUSCULAR; INTRAVENOUS PRN
Status: DISCONTINUED | OUTPATIENT
Start: 2025-05-29 | End: 2025-05-29

## 2025-05-29 RX ORDER — PROPOFOL 10 MG/ML
INJECTION, EMULSION INTRAVENOUS PRN
Status: DISCONTINUED | OUTPATIENT
Start: 2025-05-29 | End: 2025-05-29

## 2025-05-29 RX ORDER — ONDANSETRON 4 MG/1
4 TABLET, FILM COATED ORAL EVERY 6 HOURS PRN
Qty: 12 TABLET | Refills: 0 | Status: SHIPPED | OUTPATIENT
Start: 2025-05-29

## 2025-05-29 RX ORDER — PROPOFOL 10 MG/ML
INJECTION, EMULSION INTRAVENOUS CONTINUOUS PRN
Status: DISCONTINUED | OUTPATIENT
Start: 2025-05-29 | End: 2025-05-29

## 2025-05-29 RX ORDER — SODIUM CHLORIDE, SODIUM LACTATE, POTASSIUM CHLORIDE, CALCIUM CHLORIDE 600; 310; 30; 20 MG/100ML; MG/100ML; MG/100ML; MG/100ML
INJECTION, SOLUTION INTRAVENOUS CONTINUOUS
Status: ACTIVE | OUTPATIENT
Start: 2025-05-29

## 2025-05-29 RX ORDER — ACETAMINOPHEN 325 MG/1
975 TABLET ORAL ONCE
Status: COMPLETED | OUTPATIENT
Start: 2025-05-29 | End: 2025-05-29

## 2025-05-29 RX ORDER — FENTANYL CITRATE 50 UG/ML
INJECTION, SOLUTION INTRAMUSCULAR; INTRAVENOUS PRN
Status: DISCONTINUED | OUTPATIENT
Start: 2025-05-29 | End: 2025-05-29

## 2025-05-29 RX ADMIN — PROPOFOL 70 MG: 10 INJECTION, EMULSION INTRAVENOUS at 12:19

## 2025-05-29 RX ADMIN — ACETAMINOPHEN 975 MG: 325 TABLET ORAL at 11:03

## 2025-05-29 RX ADMIN — LIDOCAINE HYDROCHLORIDE 40 MG: 20 INJECTION, SOLUTION INFILTRATION; PERINEURAL at 12:20

## 2025-05-29 RX ADMIN — FENTANYL CITRATE 50 MCG: 50 INJECTION, SOLUTION INTRAMUSCULAR; INTRAVENOUS at 12:19

## 2025-05-29 RX ADMIN — SODIUM CHLORIDE, SODIUM LACTATE, POTASSIUM CHLORIDE, CALCIUM CHLORIDE: 600; 310; 30; 20 INJECTION, SOLUTION INTRAVENOUS at 12:13

## 2025-05-29 RX ADMIN — PROPOFOL 150 MCG/KG/MIN: 10 INJECTION, EMULSION INTRAVENOUS at 12:21

## 2025-05-29 RX ADMIN — ONDANSETRON 4 MG: 2 INJECTION INTRAMUSCULAR; INTRAVENOUS at 12:30

## 2025-05-29 NOTE — ANESTHESIA CARE TRANSFER NOTE
Patient: Idalmis Swann    Procedure: Procedure(s):  RIGHT dorsal wrist mass excisional biopsy       Diagnosis: Ganglion of right wrist [M67.431]  Diagnosis Additional Information: No value filed.    Anesthesia Type:   MAC     Note:    Oropharynx: oropharynx clear of all foreign objects  Level of Consciousness: awake  Oxygen Supplementation: face mask  Level of Supplemental Oxygen (L/min / FiO2): 6  Independent Airway: airway patency satisfactory and stable  Dentition: dentition unchanged  Vital Signs Stable: post-procedure vital signs reviewed and stable  Report to RN Given: handoff report given  Patient transferred to: Phase II    Handoff Report: Identifed the Patient, Identified the Reponsible Provider, Reviewed the pertinent medical history, Discussed the surgical course, Reviewed Intra-OP anesthesia mangement and issues during anesthesia, Set expectations for post-procedure period and Allowed opportunity for questions and acknowledgement of understanding      Vitals:  Vitals Value Taken Time   /70 05/29/25 13:05   Temp     Pulse 83 05/29/25 13:05   Resp     SpO2 100 % 05/29/25 13:08   Vitals shown include unfiled device data.    Electronically Signed By: RAJESH Shay CRNA  May 29, 2025  1:09 PM

## 2025-05-29 NOTE — DISCHARGE INSTRUCTIONS
Hand Surgery Discharge Instructions    Patient has been treated for right dorsal wrist mass with Dr. Carney on 5/29/2025.     Care: Please keep the splint/cast/dressing clean and dry at all times. Should it get wet, please call the clinic to schedule an appointment - as it may need to be replaced. Do not hesitate to use the sling to help protect the affected extremity and in particular in the setting of a nerve block.     Medications: You can take Ibuprofen 400-800 mg and Tylenol 650 mg for pain relief. Please take each every 6 hours, and for optimal pain relief - please stagger the medications so that you are taking one or the other every 3 hours. If you had a nerve block, the effects may last 8-12 hours. If you have been prescribed additional pain medications, please take as instructed and as needed. If you are taking additional pain medications, please do not exceed 4000 mg of Tylenol daily from all sources. Also, if you are taking narcotic medications, please do not operate heavy machinery or drive. If you have been prescribed antibiotics, please also take as instructed.     Diet: Start with clear liquids and slow down if nauseated. Advance the diet as tolerated.    Weight-bearing/Activities: Move your fingers to prevent stiffness. Elevate the affected extremity to improve throbbing sensation or pain. No strenuous activities and do not raise your heart rate above 100 bpm for the first few weeks.  Limit your lifting with the right hand to approximately 5 to 7 pounds.    ER Instructions: Please call the office and/or consider return to the ER if you experience worsening pain not relieved by medications, increased swelling, redness or high fevers >101F or if there are unexpected problems like shortness of breath.    Post-op follow-up: Clinic in 10-14 days with Dr. Carney or his PA at the [facility]. Please call our Ortho triage line if you have any questions or concerns before your clinic  "visit: [phone].    Ever Carney MD, PhD, [facility]  Home Care Following Anesthesia  For 24 hours after surgery:  Get plenty of rest.  A responsible adult must stay with you for at least 24 hours after you leave the surgery center.  Do not drive or use heavy equipment.  If you have weakness or tingling, don't drive or use heavy equipment until this feeling goes away.   Do not drink alcohol.   Avoid strenuous or risky activities.  Ask for help when climbing stairs.  You may feel lightheaded.  IF so, sit for a few minutes before standing.  Have someone help you get up.   If you have nausea (feel sick to your stomach): Drink only clear liquids such as apple juice, ginger ale, broth or 7-Up.  Rest may also help.  Be sure to drink enough fluids.  Move to a regular diet as you feel able.   You may have a slight fever.  Call the doctor if your fever is over 100 F (37.7 C) (taken under the tongue) or lasts longer than 24 hours.  You may have a dry mouth, a sore throat, muscle aches or trouble sleeping. These should go away after 24 hours.  Do not make important or legal decisions.   It is recommended to avoid smoking.   If you use hormonal birth control (such as the pill, patch, ring or implants):  You will need a second form of birth control for 7 days (condoms, a diaphragm or contraceptive foam).  While in the surgery center, you received a medicine called Sugammadex.  Hormonal birth control (such as the pill, patch, ring or implants) will not work as well for a week after taking this medicine.  Today you received a Marcaine or bupivacaine block to numb the nerves near your surgery site.  This is a block using local anesthetic or \"numbing\" medication injected around the nerves to anesthetize or \"numb\" the area supplied by those nerves.  This block is injected into the muscle layer near your surgical site.  The medication may numb the location where you had " surgery for 6-18 hours, but may last up to 24 hours.  If your surgical site is an arm or leg you should be careful with your affected limb, since it is possible to injure your limb without being aware of it due to the numbing.  Until full feeling returns, you should guard against bumping or hitting your limb, and avoid extreme hot or cold temperatures on the skin.  As the block wears off, the feeling will return as a tingling or prickly sensation near your surgical site.  You will experience more discomfort from your incision as the feeling returns.  You may want to take a pain pill (a narcotic or Tylenol if this was prescribed by your surgeon) when you start to experience mild pain before the pain beccomes more severe.  If your pain medications do not control your pain you should notifiy your surgeon.    Tips for taking pain medications  To get the best pain relief possible, remember these points:  Take pain medications as directed, before pain becomes severe.  Pain medication can upset your stomach: taking it with food may help.  Constipation is a common side effect of pain medication. Drink plenty of  fluids.  Eat foods high in fiber. Take a stool softener if recommended by your doctor or pharmacist.  Do not drink alcohol, drive or operate machinery while taking pain medications.  Ask about other ways to control pain, such as with heat, ice or relaxation.    Tylenol/Acetaminophen Consumption    If you feel your pain relief is insufficient, you may take Tylenol/Acetaminophen in addition to your narcotic pain medication.   Be careful not to exceed 4,000 mg of Tylenol/Acetaminophen in a 24 hour period from all sources.  If you are taking extra strength Tylenol/acetaminophen (500 mg), the maximum dose is 8 tablets in 24 hours.  If you are taking regular strength acetaminophen (325 mg), the maximum dose is 12 tablets in 24 hours.    Call a doctor for any of the following:  Signs of infection (fever, growing tenderness  at the surgery site, a large amount of drainage or bleeding, severe pain, foul-smelling drainage, redness, swelling).  It has been over 8 to 10 hours since surgery and you are still not able to urinate (pass water).  Headache for over 24 hours.  Numbness, tingling or weakness the day after surgery (if you had spinal anesthesia).  Signs of Covid-19 infection (temperature over 100 degrees, shortness of breath, cough, loss of taste/smell, generalized body aches, persistent headache, chills, sore throat, nausea/vomiting/diarrhea)    Your doctor is:       Dr. Ever Carney, Plastic Surgery: [phone]               After hours and weekends call the hospital @ [phone] and ask for the resident on call for:  Orthopaedics  For emergency care, call the:  [facility] Emergency Department: [phone] (TTY for hearing impaired: [phone])    Tylenol 975 mg given at 11 am.   Ok to take more after 5 pm.

## 2025-05-29 NOTE — OP NOTE
HAND SURGERY OPERATIVE REPORT     Date of Surgery: 5/29/2025  Surgical Service: Ortho Hand     Preoperative Diagnosis: Right dorsal wrist ganglion     Postoperative Diagnosis: Same as preoperative diagnosis     Procedures Performed: Right dorsal wrist ganglion excision     Attending:  CAMACHO Carney MD, PhD, FACS  Assistant: BRIELLE Carlton MD     Complications: None apparent  Specimens: Right dorsal wrist mass for permanent  Implants: None  Estimated blood loss: < 5 mL  Tourniquet time: 11 minutes  Wound classification: Clean  Anesthesia: MAC, sedation with local (0.25% Bupivicaine mixed 1:1 with 1% lidocaine containing 1:100k epinephrine)     Indications for Procedure: Patient presents with long-standing relapsing-remitting right dorsal wrist mass consistent with ganglion cyst.  She states that she would like to have it removed as it is symptomatic. On exam, it is transilluminating, soft, mildly tender and does not move with extensor tendons. After discussing the risks, benefits and alternative, patient consented and elected to undergo right dorsal wrist ganglion excision.      Intraoperative findings: Right dorsal wrist ganglion with thickened stalk extending down into the radiocarpal joint and arising near the scapholunate interval.      Description of Procedure: Patient was seen in the preoperative holding area.  Consent was verified.  The right dorsal wrist mass was marked.  All additional questions were answered.  The risks of the surgery were reiterated, including (but not limited to): infection, bleeding, scarring, pain, injury to surrounding structures including nerves and tendons, need for additional revision surgery, neuroma formation, complex regional pain syndrome, stiffness.  Following discussion of all these risks, the patient again agreed to proceed with surgery.  Patient was then transferred to the operating room and placed supine on the operating table.  All pressure points were padded.  Sequential  compression devices were placed on bilateral lower extremities and verified to be operational.  Preinduction timeout was performed.  Monitored anesthesia care was commenced.  Local anesthesia was injected subdermally along the planned longitudinally-oriented incision. A tourniquet was place over the forearm. The right upper extremity was prepped and draped in usual sterile fashion. The right hand was elevated and the forearm tourniquet was inflated to 250 mm Hg. A #15 blade was used to incise the skin. Next, sharp dissection was done over the cyst and circumferentially around. The extensor tendons were identified and retracted out of the way. Also, any sensory nerves or veins were also dissected off the cyst capsule and gently retracted. Once isolated, the stalk was identified and found to arise from the radiocarpal joint. This stalk was divided and the cyst was removed. The base of the stalk was cauterized with bipolar electrocautery. Next, the tourniquet was taken down and hemostasis was additionally obtained. The surgical wound was copiously irrigated with sterile normal saline. The skin was closed with 3-0 interrupted deep dermal and 4-0 running intracuticular Monocryl suture. The incision was dressed with skin adhesive and steri-strips, gauze, cast padding and a short volar wrist plaster splint was placed. The patient was woken up and transferred to the PACU without events.     Postoperative plan: Clinic in 10-14 days.     Ever Carney MD, PhD, FACS

## 2025-05-29 NOTE — ANESTHESIA POSTPROCEDURE EVALUATION
Patient: Idalmis Swann    Procedure: Procedure(s):  RIGHT dorsal wrist mass excisional biopsy       Anesthesia Type:  MAC    Note:  Disposition: Outpatient   Postop Pain Control: Uneventful            Sign Out: Well controlled pain   PONV: No   Neuro/Psych: Uneventful            Sign Out: Acceptable/Baseline neuro status   Airway/Respiratory: Uneventful            Sign Out: Acceptable/Baseline resp. status   CV/Hemodynamics: Uneventful            Sign Out: Acceptable CV status; No obvious hypovolemia; No obvious fluid overload   Other NRE: NONE   DID A NON-ROUTINE EVENT OCCUR? No           Last vitals:  Vitals Value Taken Time   /67 05/29/25 13:15   Temp 36.3  C (97.4  F) 05/29/25 13:15   Pulse 70 05/29/25 13:15   Resp 16 05/29/25 13:15   SpO2 97 % 05/29/25 13:21   Vitals shown include unfiled device data.    Electronically Signed By: Conor Guzman MD  May 29, 2025  1:21 PM

## 2025-05-29 NOTE — BRIEF OP NOTE
[facility]    Brief Operative Note    Pre-operative diagnosis: Ganglion of right wrist [M67.431]  Post-operative diagnosis Same as pre-operative diagnosis    Procedure: RIGHT dorsal wrist mass excisional biopsy, Right - Wrist    Surgeon: Surgeons and Role:     * Ever Carney MD - Primary     * Jose Carlton MD - Resident - Assisting  Anesthesia: MAC with Local   Estimated Blood Loss: Less than 5 ml    Drains: None  Specimens:   ID Type Source Tests Collected by Time Destination   1 : right wrist mass Tissue Wrist, Right SURGICAL PATHOLOGY EXAM Ever Carney MD 5/29/2025 12:50 PM      Findings:   Ganglion cyst appearing mass removed and sent for pathology.  Complications: None.  Implants: * No implants in log *

## 2025-05-29 NOTE — PROGRESS NOTES
Ortho Hand    No changes in history or exam, except the right dorsal wrist mass is slightly larger.  Medically optimized.    OR today for right dorsal wrist mass excisional biopsy.    Discussed the risks of surgery, including but not limited to: infection, bleeding, pain, poor scarring, wound healing issues, recurrence of the mass, need for revision or additional surgery, injury to nerves or tendons, neuroma formation, stiffness, complex regional pain syndrome, anesthesia-related complications. Despite these risks, patient consents to RIGHT dorsal wrist mass excisional biopsy. All questions answered.     Ever Carney MD, PhD, FACS

## 2025-06-04 LAB
PATH REPORT.COMMENTS IMP SPEC: NORMAL
PATH REPORT.COMMENTS IMP SPEC: NORMAL
PATH REPORT.FINAL DX SPEC: NORMAL
PATH REPORT.GROSS SPEC: NORMAL
PATH REPORT.MICROSCOPIC SPEC OTHER STN: NORMAL
PATH REPORT.RELEVANT HX SPEC: NORMAL
PHOTO IMAGE: NORMAL

## 2025-06-13 ENCOUNTER — OFFICE VISIT (OUTPATIENT)
Dept: ORTHOPEDICS | Facility: CLINIC | Age: 25
End: 2025-06-13
Payer: COMMERCIAL

## 2025-06-13 DIAGNOSIS — Z98.890 POST-OPERATIVE STATE: ICD-10-CM

## 2025-06-13 DIAGNOSIS — M67.431 GANGLION OF RIGHT WRIST: Primary | ICD-10-CM

## 2025-06-13 PROCEDURE — 99024 POSTOP FOLLOW-UP VISIT: CPT | Performed by: PHYSICIAN ASSISTANT

## 2025-06-13 NOTE — PROGRESS NOTES
"DME FITTING    Relevant Diagnosis: Post op right wist mass excision  Wrist,Quickfit, Rt, <10\" brace was fit on patient's Right Wrist.     Person(s) involved in teaching:   Patient    Brace was applied in standard Manner:  Yes  Brace fit well:  Yes  Patient reports brace to fit comfortably:  Yes    Education:   Patient shown self application and removal of brace: Yes  Patient shown how to adjust brace fit, if necessary: Yes  Patient educated on billing and return policy: Yes  Patient confirmed understanding when and how to contact clinic with concerns: Yes    "

## 2025-06-13 NOTE — LETTER
6/13/2025      Idalmis Swann  [address]  [facility] [address]      Dear Colleague,    Thank you for referring your patient, Idalmis Swann, to the [facility] [place]. Please see a copy of my visit note below.    Date of Service: Jun 13, 2025    Chief Complaint: Post operative follow up.     Date of Surgery: 5/29/2025    Procedure Performed: right dorsal ganglion cyst excision.    Surgeon: Dr. Oc Carney    Interval events: Idalmis Swann is a 24 year old female who presents today for a postoperative follow up.  She is doing okay.  Pain has been reasonably well-controlled.  Denies any numbness or tingling.    The past medical history was reviewed updated in the EMR. This includes medications, surgeries, social history, and review of systems.    Physical examination:  Well-developed, well-nourished and in no acute distress.  Alert and oriented to surroundings.  On examination of the  right wrist, incision is well-healed. There is no erythema, drainage, or dehiscence. Swelling is Mild. Sensation is intact in median, radial and ulnar nerve distributions. Patient can actively flex and extend all digits and thumb. The patient is able to make a full composite fist. Fingers are warm and well-perfused. Radial pulse is palpable.     Assessment: 24 year old female s/p right dorsal ganglion cyst excision., progressing appropriately.     Plan:   Pathology results reviewed demonstrating ganglion cyst. Patient should continue to wash wound with soap and water daily and pat dry. No immersing the wound in water for prolonged periods such as tub baths or dishwashing without gloves until wound has healed. Can start gentle active ROM as tolerated after the wound is fully healed.  May wear wrist brace as needed for comfort, but encouraged her to wean out of this as tolerated.  Patient provided with wrist brace today per her request.  Follow up as needed.     DIMITRI LÓPEZ  "NANDO  Orthopaedic Surgery     DME FITTING    Relevant Diagnosis: Post op right wist mass excision  Wrist,Quickfit, Rt, <10\" brace was fit on patient's Right Wrist.     Person(s) involved in teaching:   Patient    Brace was applied in standard Manner:  Yes  Brace fit well:  Yes  Patient reports brace to fit comfortably:  Yes    Education:   Patient shown self application and removal of brace: Yes  Patient shown how to adjust brace fit, if necessary: Yes  Patient educated on billing and return policy: Yes  Patient confirmed understanding when and how to contact clinic with concerns: Yes      Again, thank you for allowing me to participate in the care of your patient.        Sincerely,        Cynthia Wong PA-C    Electronically signed"

## 2025-06-13 NOTE — NURSING NOTE
Reason For Visit:   Chief Complaint   Patient presents with    Surgical Followup     Post op RIGHT dorsal wrist mass excisional biopsy  DOS: 5/29/25       Primary MD: Megan Kyle  Ref. MD: Sussy    Age: 24 year old    ?  No      There were no vitals taken for this visit.      Pain Assessment  Patient Currently in Pain: Yes  Patient's Stated Pain Goal: 6  Primary Pain Location: Wrist (Right)  Pain Descriptors: Itching, Constant, Intermittent    Hand Dominance Evaluation  Hand Dominance: Right          QuickDASH Assessment      1/28/2025     5:53 PM   QuickDASH Main   1. Open a tight or new jar Moderate difficulty   2. Do heavy household chores (e.g., wash walls, floors) Moderate difficulty   3. Carry a shopping bag or briefcase No difficulty   4. Wash your back Mild difficulty   5. Use a knife to cut food Mild difficulty   6. Recreational activities in which you take some force or impact through your arm, shoulder or hand (e.g., golf, hammering, tennis, etc.) Moderate difficulty   7. During the past week, to what extent has your arm, shoulder or hand problem interfered with your normal social activities with family, friends, neighbours or groups Moderately   8. During the past week, were you limited in your work or other regular daily activities as a result of your arm, shoulder or hand problem Moderately limited   9. Arm, shoulder or hand pain Mild   10.Tingling (pins and needles) in your arm,shoulder or hand Moderate   11. During the past week, how much difficulty have you had sleeping because of the pain in your arm, shoulder or hand Moderate difficulty   Quickdash Ability Score 38.64          Current Outpatient Medications   Medication Sig Dispense Refill    albuterol (PROAIR HFA/PROVENTIL HFA/VENTOLIN HFA) 108 (90 Base) MCG/ACT inhaler Inhale 2 puffs into the lungs every 6 hours as needed for shortness of breath, wheezing or cough 18 g 0    albuterol (PROVENTIL) (2.5 MG/3ML) 0.083% neb  solution TAKE 3 ML (2.5 MG) EVERY 6 HOURS AS NEEDED BY NEBULIZATION FOR WHEEZING FOR UP TO 14 DAYS      EPINEPHrine (ANY BX GENERIC EQUIV) 0.3 MG/0.3ML injection 2-pack Inject 0.3 mLs (0.3 mg) into the muscle as needed for anaphylaxis 2 each 1    fluticasone-salmeterol (ADVAIR) 500-50 MCG/ACT inhaler Inhale 1 puff into the lungs 2 times daily. Uses PRN      ondansetron (ZOFRAN ODT) 4 MG ODT tab Take 1 tablet (4 mg) by mouth every 8 hours as needed for nausea. 30 tablet 1    ondansetron (ZOFRAN) 4 MG tablet Take 1 tablet (4 mg) by mouth every 6 hours as needed for nausea. 12 tablet 0    tirzepatide-Weight Management (ZEPBOUND) 2.5 MG/0.5ML prefilled pen Inject 0.5 mLs (2.5 mg) subcutaneously every 7 days. 2 mL 0    topiramate ER (QUDEXY XR) 25 MG 24 hr capsule Take 1 capsule (25 mg) by mouth daily. 90 capsule 1       Allergies   Allergen Reactions    Cat Dander Shortness Of Breath    Vitex Swelling     Hazelnut tree allergy skin test    Bactrim [Sulfamethoxazole-Trimethoprim]     Elemental Sulfur     Penicillins     Sulfa Antibiotics Nausea and Vomiting    Tree Extract      Hazelnut tree allergy skin test    Tree Nuts [Nuts]     Alternaria Alternata Allergy Skin Test Rash    Dog Epithelium (Canis Lupus Familiaris) Itching    Latex Rash    Mold Cough       LEENA,SADIA, ATC

## 2025-06-13 NOTE — PROGRESS NOTES
Date of Service: Jun 13, 2025    Chief Complaint: Post operative follow up.     Date of Surgery: 5/29/2025    Procedure Performed: right dorsal ganglion cyst excision.    Surgeon: Dr. Oc Carney    Interval events: Idalmis Swann is a 24 year old female who presents today for a postoperative follow up.  She is doing okay.  Pain has been reasonably well-controlled.  Denies any numbness or tingling.    The past medical history was reviewed updated in the EMR. This includes medications, surgeries, social history, and review of systems.    Physical examination:  Well-developed, well-nourished and in no acute distress.  Alert and oriented to surroundings.  On examination of the  right wrist, incision is well-healed. There is no erythema, drainage, or dehiscence. Swelling is Mild. Sensation is intact in median, radial and ulnar nerve distributions. Patient can actively flex and extend all digits and thumb. The patient is able to make a full composite fist. Fingers are warm and well-perfused. Radial pulse is palpable.     Assessment: 24 year old female s/p right dorsal ganglion cyst excision., progressing appropriately.     Plan:   Pathology results reviewed demonstrating ganglion cyst. Patient should continue to wash wound with soap and water daily and pat dry. No immersing the wound in water for prolonged periods such as tub baths or dishwashing without gloves until wound has healed. Can start gentle active ROM as tolerated after the wound is fully healed.  May wear wrist brace as needed for comfort, but encouraged her to wean out of this as tolerated.  Patient provided with wrist brace today per her request.  Follow up as needed.     DIMITRI LÓPEZ PA-C  Orthopaedic Surgery

## 2025-06-28 ENCOUNTER — MYC MEDICAL ADVICE (OUTPATIENT)
Dept: INTERNAL MEDICINE | Facility: CLINIC | Age: 25
End: 2025-06-28
Payer: COMMERCIAL

## 2025-06-28 DIAGNOSIS — E78.2 MIXED DYSLIPIDEMIA: ICD-10-CM

## 2025-06-28 DIAGNOSIS — E66.811 CLASS 1 OBESITY WITH SERIOUS COMORBIDITY AND BODY MASS INDEX (BMI) OF 34.0 TO 34.9 IN ADULT, UNSPECIFIED OBESITY TYPE: Primary | ICD-10-CM

## 2025-07-02 ENCOUNTER — TELEPHONE (OUTPATIENT)
Dept: PLASTIC SURGERY | Facility: CLINIC | Age: 25
End: 2025-07-02
Payer: COMMERCIAL

## 2025-07-02 DIAGNOSIS — T81.41XA INFECTION INVOLVING SUTURE WITH ABSCESS: Primary | ICD-10-CM

## 2025-07-02 RX ORDER — CEPHALEXIN 500 MG/1
500 CAPSULE ORAL 4 TIMES DAILY
Qty: 28 CAPSULE | Refills: 0 | Status: SHIPPED | OUTPATIENT
Start: 2025-07-02 | End: 2025-07-09

## 2025-07-02 NOTE — TELEPHONE ENCOUNTER
Spoke with the patient.  She reports increased redness, drainage from her wound.  She has been covering it with a Tegaderm all of the time.  Denies any fevers or chills.  [place] be back until next week.    Will treat for a superficial surgical site infection.  Patient has a history of MRSA infections.  Will treat with Keflex 500 mg 4 times daily for 7 days.  She will contact us if his symptoms change.  I advised her if symptoms worsen over the weekend she be seen in urgent care where she is at.  Otherwise she will give us an update next week on how she is doing.    DIMITRI LÓPEZ PA-C  Orthopaedic Surgery

## 2025-07-02 NOTE — TELEPHONE ENCOUNTER
Other: Patient is 5 weeks post op and has noticed that her incision has redness. Another one of her providers thinks that it may be infection has developed. Patient was hoping to come in and have the incision looked at by Cynthia MILNER. Availability is not until 7/14. Please call her back.     Could we send this information to you in [employer] or would you prefer to receive a phone call?:   Patient would prefer a phone call   Okay to leave a detailed message?: Yes at Cell number on file:    Telephone Information:   Mobile [phone]

## 2025-07-08 ENCOUNTER — OFFICE VISIT (OUTPATIENT)
Dept: ORTHOPEDICS | Facility: CLINIC | Age: 25
End: 2025-07-08
Payer: COMMERCIAL

## 2025-07-08 DIAGNOSIS — Z98.890 POST-OPERATIVE STATE: ICD-10-CM

## 2025-07-08 DIAGNOSIS — M67.431 GANGLION OF RIGHT WRIST: Primary | ICD-10-CM

## 2025-07-08 PROCEDURE — 99024 POSTOP FOLLOW-UP VISIT: CPT | Performed by: STUDENT IN AN ORGANIZED HEALTH CARE EDUCATION/TRAINING PROGRAM

## 2025-07-08 NOTE — PROGRESS NOTES
Ortho Hand    Patient returns 6 weeks after right dorsal wrist ganglion excision complicated by minor superficial dehiscence with questionable cellulitis.  Patient has been taking Keflex.  The redness has improved.  She is concerned about the stiffness.    On exam, right dorsal wrist incision is largely intact, but with a small area of superficial wound with no surrounding signs of infection.  Right wrist extension actively is limited to 40-45 degrees, flexion is limited to 10-15 degrees.  Patient can be passively extended to 60-65 degrees with some discomfort, and passively flexed an additional 10-15 degrees with some discomfort.  No axial right wrist loaded tenderness.  Patient can make a full active right composite fist.    Path: Synovial cyst    A/P: 25-year-old female 6 weeks status post right dorsal wrist ganglion excision complicated by superficial dehiscence    - Discussed options for ongoing management.  In my opinion, patient should continue to wash the area with warm water and soap, air or gently clean towel dry, apply petroleum based ointment like Aquaphor, and a Band-Aid.  She should change the Band-Aid once daily.  Expectation is that this incision should fully heal over the next 1-2 weeks.  -As for the stiffness, it is related to the swelling and ongoing healing in the setting of this minor dehiscence.  Instructed patient on passive place and hold exercises.  OT for assistance with activities and motion exercises.  Return to clinic in approximately 4 weeks for reevaluation of motion.  Reassured the patient that this should improve over the subsequent month, but that her stiffness may persist for several months.  -No soaking or swimming until the superficial wound has fully healed    Ever Carney MD, PhD, FACS

## 2025-07-08 NOTE — LETTER
7/8/2025      Idalmis Swann  [address]  [facility] [address]      Dear Colleague,    Thank you for referring your patient, Idalmis Swann, to the [facility]. Please see a copy of my visit note below.    Ortho Hand    Patient returns 6 weeks after right dorsal wrist ganglion excision complicated by minor superficial dehiscence with questionable cellulitis.  Patient has been taking Keflex.  The redness has improved.  She is concerned about the stiffness.    On exam, right dorsal wrist incision is largely intact, but with a small area of superficial wound with no surrounding signs of infection.  Right wrist extension actively is limited to 40-45 degrees, flexion is limited to 10-15 degrees.  Patient can be passively extended to 60-65 degrees with some discomfort, and passively flexed an additional 10-15 degrees with some discomfort.  No axial right wrist loaded tenderness.  Patient can make a full active right composite fist.    Path: Synovial cyst    A/P: 25-year-old female 6 weeks status post right dorsal wrist ganglion excision complicated by superficial dehiscence    - Discussed options for ongoing management.  In my opinion, patient should continue to wash the area with warm water and soap, air or gently clean towel dry, apply petroleum based ointment like Aquaphor, and a Band-Aid.  She should change the Band-Aid once daily.  Expectation is that this incision should fully heal over the next 1-2 weeks.  -As for the stiffness, it is related to the swelling and ongoing healing in the setting of this minor dehiscence.  Instructed patient on passive place and hold exercises.  OT for assistance with activities and motion exercises.  Return to clinic in approximately 4 weeks for reevaluation of motion.  Reassured the patient that this should improve over the subsequent month, but that her stiffness may persist for several months.  -No soaking or swimming until  the superficial wound has fully healed    Ever Carney MD, PhD, FACS    Again, thank you for allowing me to participate in the care of your patient.        Sincerely,        Ever Carney MD    Electronically signed

## 2025-07-08 NOTE — NURSING NOTE
Reason For Visit:   Chief Complaint   Patient presents with    Surgical Followup     Post op RIGHT dorsal wrist mass excisional biopsy - Right. DOS: 5/29/25. Patient presents today with a suspected post operative infection.        Primary MD: Megan Kyle  Ref. MD: Sussy    Age: 25 year old    ?  No      There were no vitals taken for this visit.      Pain Assessment  Patient Currently in Pain: Yes  Primary Pain Location: Wrist (Right)  Pain Descriptors: Sharp, Intermittent, Itching (Reduced ROM)    Hand Dominance Evaluation  Hand Dominance: Right          QuickDASH Assessment      1/28/2025     5:53 PM   QuickDASH Main   1. Open a tight or new jar Moderate difficulty   2. Do heavy household chores (e.g., wash walls, floors) Moderate difficulty   3. Carry a shopping bag or briefcase No difficulty   4. Wash your back Mild difficulty   5. Use a knife to cut food Mild difficulty   6. Recreational activities in which you take some force or impact through your arm, shoulder or hand (e.g., golf, hammering, tennis, etc.) Moderate difficulty   7. During the past week, to what extent has your arm, shoulder or hand problem interfered with your normal social activities with family, friends, neighbours or groups Moderately   8. During the past week, were you limited in your work or other regular daily activities as a result of your arm, shoulder or hand problem Moderately limited   9. Arm, shoulder or hand pain Mild   10.Tingling (pins and needles) in your arm,shoulder or hand Moderate   11. During the past week, how much difficulty have you had sleeping because of the pain in your arm, shoulder or hand Moderate difficulty   Quickdash Ability Score 38.64          Current Outpatient Medications   Medication Sig Dispense Refill    albuterol (PROAIR HFA/PROVENTIL HFA/VENTOLIN HFA) 108 (90 Base) MCG/ACT inhaler Inhale 2 puffs into the lungs every 6 hours as needed for shortness of breath, wheezing or cough 18  g 0    albuterol (PROVENTIL) (2.5 MG/3ML) 0.083% neb solution TAKE 3 ML (2.5 MG) EVERY 6 HOURS AS NEEDED BY NEBULIZATION FOR WHEEZING FOR UP TO 14 DAYS      cephALEXin (KEFLEX) 500 MG capsule Take 1 capsule (500 mg) by mouth 4 times daily for 7 days. 28 capsule 0    EPINEPHrine (ANY BX GENERIC EQUIV) 0.3 MG/0.3ML injection 2-pack Inject 0.3 mLs (0.3 mg) into the muscle as needed for anaphylaxis 2 each 1    fluticasone-salmeterol (ADVAIR) 500-50 MCG/ACT inhaler Inhale 1 puff into the lungs 2 times daily. Uses PRN      ondansetron (ZOFRAN ODT) 4 MG ODT tab Take 1 tablet (4 mg) by mouth every 8 hours as needed for nausea. 30 tablet 1    ondansetron (ZOFRAN) 4 MG tablet Take 1 tablet (4 mg) by mouth every 6 hours as needed for nausea. 12 tablet 0    tirzepatide-Weight Management (ZEPBOUND) 2.5 MG/0.5ML prefilled pen Inject 0.5 mLs (2.5 mg) subcutaneously every 7 days. 2 mL 0    tirzepatide-Weight Management (ZEPBOUND) 5 MG/0.5ML prefilled pen Inject 0.5 mLs (5 mg) subcutaneously every 7 days. 2 mL 2    topiramate ER (QUDEXY XR) 25 MG 24 hr capsule Take 1 capsule (25 mg) by mouth daily. 90 capsule 1       Allergies   Allergen Reactions    Cat Dander Shortness Of Breath    Vitex Swelling     Hazelnut tree allergy skin test    Bactrim [Sulfamethoxazole-Trimethoprim]     Elemental Sulfur     Penicillins     Sulfa Antibiotics Nausea and Vomiting    Tree Extract      Hazelnut tree allergy skin test    Tree Nuts [Nuts]     Alternaria Alternata Allergy Skin Test Rash    Dog Epithelium (Canis Lupus Familiaris) Itching    Latex Rash    Mold Cough       Karen Pichardo, ATC

## 2025-07-18 PROBLEM — M25.531 RIGHT WRIST PAIN: Status: ACTIVE | Noted: 2025-07-18

## 2025-07-23 ENCOUNTER — VIRTUAL VISIT (OUTPATIENT)
Dept: ONCOLOGY | Facility: CLINIC | Age: 25
End: 2025-07-23
Attending: INTERNAL MEDICINE
Payer: COMMERCIAL

## 2025-07-23 DIAGNOSIS — Z91.89 AT HIGH RISK FOR BREAST CANCER: ICD-10-CM

## 2025-07-23 DIAGNOSIS — Z80.41 FAMILY HISTORY OF MALIGNANT NEOPLASM OF OVARY: ICD-10-CM

## 2025-07-23 DIAGNOSIS — Z80.3 FAMILY HISTORY OF MALIGNANT NEOPLASM OF BREAST: Primary | ICD-10-CM

## 2025-07-23 DIAGNOSIS — Z80.0 FAMILY HISTORY OF STOMACH CANCER: ICD-10-CM

## 2025-07-23 PROCEDURE — 96041 GENETIC COUNSELING SVC EA 30: CPT | Mod: GT,95 | Performed by: GENETIC COUNSELOR, MS

## 2025-07-23 NOTE — PATIENT INSTRUCTIONS
Assessing Cancer Risk  Cancer is a common diagnosis which impacts many families.  Individuals may develop cancer due to environmental factors (such as exposures and lifestyle), aging, genetic predisposition, or a combination of these factors.      Only about 5-10% of cancers are thought to be due to an inherited cancer susceptibility gene.    These families often have:  Several people with the same or related types of cancer  Cancers diagnosed at a young age (before age 50)  Individuals with more than one primary cancer  Multiple generations of the family affected with cancer    Comprehensive Breast and Gynecologic Cancer Panel  We each inherit two copies of every gene in our bodies: one from our mother, and one from our father. Each gene has a specific job to do.  When a gene has a mistake or  mutation  in it, it does not work like it should.     Some people may be candidates for genetic testing of more than one gene.  For these families, genetic testing using a cancer panel may be offered. These panels will test different genes at once known to increase the risk for breast, ovarian, uterine, and/or other cancers.    This handout will review common hereditary breast and gynecologic cancer syndromes. The genes that will be discussed in this handout are: NICHELLE, BRCA1, BRCA2, BRIP1, CDH1, CHEK2, MLH1, MSH2, MSH6, PMS2, EPCAM, PTEN, PALB2, RAD51C, RAD51D, and TP53.    The purpose of this handout is to serve as a brief summary of the breast and gynecologic cancer risk genes that have published clinical management guidelines for individuals who are found to carry a mutation. Inheriting a mutation does not mean a person will develop cancer, but it does significantly increase their risk above the general population risk.     ______________________________________________________________________________    Hereditary Breast and Ovarian Cancer Syndrome (BRCA1 and BRCA2)  A single mutation in one of the copies of BRCA1 or  BRCA2 increases the risk for breast and ovarian cancer, among others.  The risk for pancreatic cancer and melanoma may also be slightly increased in some families.  The chart below shows the chance that someone with a BRCA mutation would develop cancer in his or her lifetime1,2,3,4.       Lifetime Cancer Risks    General Population BRCA1  BRCA2   Breast  12% >60% >60%   Ovarian  1-2% 39-58% 13-29%   Prostate 12% 7-26% 19-61%   Male Breast 0.1% 0.2-1.2% 1.8-7.1%   Pancreas 1-2% Up to 5% 5-10%     A person s ethnic background is also important to consider, as individuals of Ashkenazi Religion ancestry have a higher chance of having a BRCA gene mutation.  There are three BRCA mutations that occur more frequently in this population.      Garcias Syndrome (MLH1, MSH2, MSH6, PMS2, and EPCAM)  Currently five genes are known to cause Garcias Syndrome: MLH1, MSH2, MSH6, PMS2, and EPCAM.  A single mutation in one of the Gracias Syndrome genes increases the risk for colon, endometrial, ovarian, and stomach cancers.  Other cancers that occur less commonly in Garcias Syndrome include urinary tract, skin, and brain cancers.  The chart below shows the chance that a person with Garcias syndrome would develop cancer in his or her lifetime5.      Lifetime Cancer Risks    General Population Garcias Syndrome   Colon 5% 10-61%   Endometrial 3% 13-57%   Ovarian 1-2% 1-38%   Stomach <1% 1-9%   *Cancer risk varies depending on Garcias syndrome gene found      Cowden Syndrome (PTEN)  Cowden syndrome is a hereditary condition that increases the risk for breast, thyroid, endometrial, colon, and kidney cancer.  Cowden syndrome is caused by a mutation in the PTEN gene.  A single mutation in one of the copies of PTEN causes Cowden syndrome and increases cancer risk.  The chart below shows the chance that someone with a PTEN mutation would develop cancer in their lifetime6,7.  Other benign features seen in some individuals with Cowden syndrome include benign  skin lesions (facial papules, keratoses, lipomas), learning disability, autism, thyroid nodules, colon polyps, and larger head size.     Lifetime Cancer Risks    General Population Cowden   Breast 12% 40-60%*   Thyroid 1% Up to 38%   Renal 1-2% Up to 35%   Endometrial 3% Up to 28%   Colon 5% Up to 9%   Melanoma 2-3% Up to 6%   *Emerging data suggests the risk for breast cancer could be greater than 60%               Li-Fraumeni Syndrome (TP53)  Li-Fraumeni Syndrome (LFS) is a cancer predisposition syndrome caused by a mutation in the TP53 gene. A single mutation in one of the copies of TP53 increases the risk for multiple cancers. Individuals with LFS are at an increased risk for developing cancer at a young age. The lifetime risk for development of a LFS-associated cancer is 50% by age 30 and 90% by age 60.   Core Cancers: Sarcomas, Breast, Brain, Lung, Leukemias/Lymphomas, Adrenocortical carcinomas  Other Cancers: Gastrointestinal, Thyroid, Skin, Genitourinary       Hereditary Diffuse Gastric Cancer (CDH1)  Currently, one gene is known to cause hereditary diffuse gastric cancer (HDGC): CDH1.  Individuals with HDGC are at increased risk for diffuse gastric cancer and lobular breast cancer. Of people diagnosed with HDGC, 30-50% have a mutation in the CDH1 gene.  This suggests there are likely other genes that may cause HDGC that have not been identified yet.      Lifetime Cancer Risks    General Population HDGC   Diffuse Gastric  <1% ~80%   Breast 12% 41-60%       Additional Genes    NICHELLE  NICHELLE is a moderate-risk breast cancer gene. Women who have a mutation in NICHELLE can have between a 2-4 fold increased risk for breast cancer compared to the general population8. NICHELLE mutations have also been associated with increased risk for pancreatic cancer between 5-10%9. Individuals who inherit two NICHELLE mutations have a condition called ataxia-telangiectasia (AT).  This rare autosomal recessive condition affects the nervous system  and immune system, and is associated with progressive cerebellar ataxia beginning in childhood. Individuals with ataxia-telangiectasia often have a weakened immune system and have an increased risk for childhood cancers.    PALB2  Mutations in PALB2 have been shown to increase the risk of breast cancer up to 41-60% in some families; where individuals fall within this risk range is dependent upon family [id number]. PALB2 mutations have also been associated with increased risk for pancreatic cancer between 5-10%.  Individuals who inherit two PALB2 mutations--one from their mother and one from their father--have a condition called Fanconi Anemia.  This rare autosomal recessive condition is associated with short stature, developmental delay, bone marrow failure, and increased risk for childhood cancers.    CHEK2   CHEK2 is a moderate-risk breast cancer gene.  Women who have a mutation in CHEK2 have around a 2-4 fold increased risk for breast cancer compared to the general population, and this risk may be higher depending upon family history.11,12,13 Mutations in CHEK2 have also been shown to increase the risk of other cancers, including prostate, however these cancer risks are currently not as well understood.    BRIP1, RAD51C and RAD51D  Mutations in RAD51C and RAD51D have been shown to increase the risk of ovarian cancer and breast cancer 14,. Mutations in BRIP1 have been shown to increase the risk of ovarian cancer and possibly female breast cancer 15 .       Lifetime Cancer Risk    General Population        BRIP1   RAD51C  RAD51D   Breast 12% Not well defined 20-40% 20-40%   Ovarian 1-2% 5-15% 10-15% 10-20%     ______________________________________________________________  Inheritance  All of the cancer syndromes reviewed above are inherited in an autosomal dominant pattern.  This means that if a parent has a mutation, each of their children will have a 50% chance of inheriting that same mutation. Therefore, each  child --male or female-- would have a 50% chance of being at increased risk for developing cancer.    Image obtained from Genetics Home Reference, 2013     Mutations in some genes can occur de richie, which means that a person s mutation occurred for the first time in them and was not inherited from a parent.  Now that they have the mutation, however, it can be passed on to future generations.    Genetic Testing  Genetic testing involves a blood test and will look for any harmful mutations that are associated with increased cancer risk.  If possible, it is recommended that the person(s) who has had cancer be tested before other family members.  That person will give us the most useful information about whether or not a specific gene is associated with the cancer in the family.    Results  There are three possible results of genetic testing:  Positive--a harmful mutation was identified in one or more of the genes  Negative--no mutations were identified in any of the genes tested  Variant of unknown significance--a variation in one of the genes was identified, but it is unclear how this impacts cancer risk in the family    Advantages and Disadvantages   There are advantages and disadvantages to genetic testing.    Advantages  May clarify your cancer risk  Can help you make medical decisions  May explain the cancers in your family  May give useful information to your family members (if you share your results)    Disadvantages  Possible negative emotional impact of learning about inherited cancer risk  Uncertainty in interpreting a negative test result in some situations  Possible genetic discrimination concerns (see below)    Genetic Information Nondiscrimination Act (MACI)  The Genetic Information Nondiscrimination Act of 2008 (MACI) is a federal law that protects individuals from health insurance or employment discrimination based on a genetic test result alone (with some exceptions, including employers with fewer than  15 employees, and ).  Although rare, MACI  does not cover discrimination protections in terms of life insurance, long term care, or disability insurances.  Visit the National Human Genome Research [place] website to learn more.    Reducing Cancer Risk  All of the genes described in this handout have nationally recognized cancer screening guidelines that would be recommended for individuals who test positive.  In addition to increased cancer screening, surgeries may be offered or recommended to reduce cancer risk.  Recommendations are based upon an individual s genetic test result as well as their personal and family history of cancer.    Questions to Think About Regarding Genetic Testing:  What effect will the test result have on me and my relationship with my family members if I have an inherited gene mutation?  If I don t have a gene mutation?  Should I share my test results, and how will my family react to this news, which may also affect them?  Are my children ready to learn new information that may one day affect their own health?    Hereditary Cancer Resources    FORCE: Facing Our Risk of Cancer Empowered facingourrisk.org   Bright Pink bebrightpink.org   Li-Fraumeni Syndrome Association lfsassociation.org   PTEN World PTENworld.[email or web address]   No stomach for cancer, Inc. nostomachforcancer.org   Stomach cancer relief network Scrnet.org   Collaborative Group of the Americas on Inherited Colorectal Cancer (CGA) cgaicc.com    Cancer Care cancercare.org   American Cancer Society (ACS) cancer.org   National Cancer [place] (NCI) cancer.gov     Please call us if you have any questions or concerns.   Cancer Risk Management Program [phone] ([phone])    References  Bette Castro PDP, Katalina S, Jayleen BOB, Ana JE, Didi RUIZ, Radha N, Uche H, Dany O, Hector A, Nancy B, Qi P, Yumiko S, Phuong DM, Aubrey N, Mary E, Silvia SALDANA, Chester LORA, Thaddeus ROBINS, Valencia ROBINS, Aminata DAVIS, Karly  H, Bhavik S, Camilo H, Raymon H, Natalia K, Lynette OP. Average risks of breast and ovarian cancer associated with BRCA1 or BRCA2 mutations detected in case series unselected for family history: a combined analysis of 222 studies. Am J Hum Dorie. 2003;72:1117-30.  Austin N, Graciela M, Johnny G.  BRCA1 and BRCA2 Hereditary Breast and Ovarian Cancer. Gene Reviews online. 2013.  Rik YC, Alice S, Javid G, Alcantara S. Breast cancer risk among male BRCA1 and BRCA2 mutation carriers. J Natl Cancer Inst. 2007;99:1811-4.  Crispin JEWELL, Guillaume I, Jason J, Christina E, Awa ER, Manav F. Risk of breast cancer in male BRCA2 carriers. J Med Dorie. 2010;47:710-1.  National Comprehensive Cancer Network. Clinical practice guidelines in oncology, colorectal cancer screening. Available online (registration required). 2015.  Gao MH, Seamus J, Anup J, Maritza WAGGONER, Johnny MS, Eng C. Lifetime cancer risks in individuals with germline PTEN mutations. Clin Cancer Res. 2012;18:400-7.  Pilarski R. Cowden Syndrome: A Critical Review of the Clinical Literature. J Dorie . 2009:18:13-27.  Arturo A, Gautam D, Donovan S, Edith P, Jossei T, Shaista M, Cale B, Mary H, Wu R, Khris K, Gayathri L, Crispin JEWELL, Phuong D, Mayo DF, Fabian MR, The Breast Cancer Susceptibility Collaboration ([place]) & Deandre HANNAH. NICHELLE mutations that cause ataxia-telangiectasia are breast cancer susceptibility alleles. Nature Genetics. 2006;38:873-875  Guy N , Keely Y, Kristy J, Blaine L, Carrie GM , Alice ML, Gallinger S, Johns AG, Syngal S, Sarah ML, Robbin J , Vicki R, Bernie SZ, Juanito JR, Leonor VE, Pedro M, Volupillostein B, Karen N, Ariadna RH, Khari KW, and Meza AP. NICHELLE mutations in patients with hereditary pancreatic cancer. Cancer Discover. 2012;2:41-46  Corazon HERNANDEZ et al. Breast-Cancer Risk in Families with Mutations in PALB2. NEJM. 2014; 371(6):497-506.  CHEK2 Breast Cancer Case-Control Consortium.  CHEK2*1100delC and susceptibility to breast cancer: A collaborative analysis involving 10,860 breast cancer cases and 9,065 controls from 10 studies. Am J Hum Dorie, 74 (2004), pp. 8704-4586  Samson T, Patsy S, Jean Pierre K, et al. Spectrum of Mutations in BRCA1, BRCA2, CHEK2, and TP53 in Families at High Risk of Breast Cancer. ALEXANDR. 2006;295(12):9471-2758.   Rupert C, Greyson D, Harish GRAHAM, et al. Risk of breast cancer in women with a CHEK2 mutation with and without a family history of breast cancer. J Clin Oncol. 2011;29:3406-6353.  Jagjit H, Giovanna E, Vicky SJ, et al. Contribution of germline mutations in the RAD51B, RAD51C, and RAD51D genes to ovarian cancer in the population. J Clin Oncol. 2015;33(26):6870-5629. Doi:10.1200/JCO.2015.61.2408.  Ike T, Ray DF, Mady P, et al. Mutations in BRIP1 confer high risk of ovarian cancer. Cecy Dorie. 2011;43(11):4446-8092. doi:10.1038/ng.955.

## 2025-07-23 NOTE — PROGRESS NOTES
Virtual Visit Details    Type of service:  Video Visit   Video Start Time: 8:52am  Video End Time: 9:56am  Originating Location (pt. Location): Home  Distant Location (provider location):  Off-site  Platform used for Video Visit: Michelle    7/23/2025    Referring Provider: Megan Kyle MD    Present for Today's Visit: Idalmis    Presenting Information:   I met with Idalmis Swann today for genetic counseling (video visit) to discuss her family history of cancer.  She is here today to review this history, cancer screening recommendations, and available genetic testing options.    Personal History:  Idalmis is a 25 year old female. She does not have any personal history of cancer. 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     She has had a bone marrow biopsy in 2022 due to high white blood cell counts that was normal.       She had her first menstrual period at age 11, her first child at age she does not currently have children, and is premenopausal. Idalmis has her ovaries, fallopian tubes and uterus in place.  She reports past history of oral contraceptive use for about 10 years and that she has never been on hormone replacement therapy.      She has not yet started regular mammogram screening given her age. She has not yet started colonoscopy screening given her age. She does not regularly do any other cancer screening at this time.  Idalmis reported no tobacco use, and no alcohol use.    Family History: Cancer family history and pertinent information reviewed below (Please see scanned pedigree for detailed family history information)  Sister with a history of lymphocytic colitis.  Mother with a history of gallbladder polyps and colon polyps (<10). She also has a history of uterine fibroids.   Two maternal aunt with pre-melanoma. Idalmis reports that they may have a  hereditary condition associated with melanoma risk, but she is unsure of the details. She reports limited communication with these aunts.  Paternal grandmother passed in her 40's/50's from esophageal cancer. She did have a history of smoking and alcohol use.   Paternal grandfather's sister with a history of breast cancer and ovarian cancer diagnosed at unknown ages.   Paternal grandfather's sister with a history of a brain cancer (unknown type) reportedly discovered on autopsy.   Paternal grandfather's sister with a history of stomach cancer diagnosed at an unknown age.   There is no known Ashkenazi Voodoo ancestry on either side of her family. There is no reported consanguinity.    Discussion:  Idalmis's family history of cancer is suggestive of a hereditary cancer syndrome.  We reviewed the features of sporadic, familial, and hereditary cancers. In looking at Idalmis's family history, it is possible that a cancer susceptibility gene is present due to her paternal great aunts' histories of breast and ovarian cancer.  We discussed the natural history and genetics of hereditary cancers. A detailed handout regarding the information we discussed will be sent to Idalmis via [employer]. Topics included: inheritance pattern, cancer risks, cancer screening recommendations, and also risks, benefits and limitations of testing.  We reviewed that the most common cause of hereditary breast cancer is Hereditary Breast and Ovarian Cancer (HBOC) syndrome, which is caused by mutations in the genes BRCA1 and BRCA2. Women with a mutation in either of these genes are at increased risk for breast and ovarian cancer. There is also an increased risk for a second primary breast cancer for women. Men with a mutation in either BRCA1 or BRCA2 are at increased risk for male breast and prostate cancer. Both women and men may also be at increased risk for pancreatic cancer and melanoma.   We also discussed that it is always most informative to test  the people in the family who have had the cancers concerning for a hereditary component or the people most closely related to those individuals. For Idalmis's family, this would be her paternal great aunts or her paternal grandfather. Unfortunately all of those individuals have passed and cannot be tested. Her father would be the next best person to testing, however, he is unable/unwilling to complete genetic testing. Therefore Idalmis is the most appropriate person to test to assess her cancer risk. We reviewed the implications of interpreting a negative genetic test results in an unaffected person. Idalmis expressed understanding and the desire to move forward with genetic testing for herself.  We discussed that there are additional genes that could cause increased risk for breast, ovarian, and related cancers. As many of these genes present with overlapping features in a family and accurate cancer risk cannot always be established based upon the pedigree analysis alone, it would be reasonable for Idalmis to consider panel genetic testing to analyze multiple genes at once.  We reviewed genetic testing options given Idalmis's family history including targeted and expanded options. After our discussion, Idalmis opted to proceed with genetic testing via the Multi-Cancer panel through [facility].   Medical Management: For Idalmis, we reviewed that the information from genetic testing may determine:  additional cancer screening for which Idalmis may qualify (i.e. mammogram and breast MRI, more frequent colonoscopies, more frequent dermatologic exams, etc.),  options for risk reducing surgeries Idalmis could consider (i.e. bilateral mastectomy, surgery to remove her ovaries and/or uterus, etc.),    and targeted chemotherapies for Idalmis if she were to develop certain cancers in the future (i.e. immunotherapy for individuals with Garcias syndrome, PARP inhibitors, etc.).   The possible outcomes of genetic testing including positive,  negative, and uncertain were discussed with Idalmis. A handout that explains this in detail was provided to the patient.  Verbal consent obtained over video today with no witness required.   These recommendations will be discussed in detail once genetic testing is completed.   Idalmis will be contacted to schedule a lab only appointment at any [facility] clinic at her earliest convenience.     I spent 75 minutes on the date of the encounter doing chart review, history and exam, documentation and further activities as noted above.    Plan:  1) Today Idalmis elected to proceed with Multi-Cancer panel genetic testing through [facility].  2) This information should be available in approximately 3 weeks from the time the lab receives the sample.  3) Idalmis will be contacted by our scheduling department to set up a virtual result disclosure appointment.     Jazzy Swann MS, [facility]  Licensed, Certified Genetic Counselor  [facility]  [email or web address]

## 2025-07-23 NOTE — LETTER
7/23/2025      Idalmis Swann  [address]  [place] [address]      Dear Colleague,    Thank you for referring your patient, Idalmis Swann, to the [facility]. Please see a copy of my visit note below.    Virtual Visit Details    Type of service:  Video Visit   Video Start Time: 8:52am  Video End Time: 9:56am  Originating Location (pt. Location): Home  Distant Location (provider location):  Off-site  Platform used for Video Visit: Well    7/23/2025    Referring Provider: Megan Kyle MD    Present for Today's Visit: Idalmis    Presenting Information:   I met with Idalmis Swann today for genetic counseling (video visit) to discuss her family history of cancer.  She is here today to review this history, cancer screening recommendations, and available genetic testing options.    Personal History:  Idalmis is a 25 year old female. She does not have any personal history of cancer. 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     She has had a bone marrow biopsy in 2022 due to high white blood cell counts that was normal.       She had her first menstrual period at age 11, her first child at age she does not currently have children, and is premenopausal. Idalmis has her ovaries, fallopian tubes and uterus in place.  She reports past history of oral contraceptive use for about 10 years and that she has never been on hormone replacement therapy.      She has not yet started regular mammogram screening given her age. She has not yet started colonoscopy screening given her age. She does not regularly do any other cancer screening at this time.  Idalmis reported no tobacco use, and no alcohol use.    Family History: Cancer family history and pertinent information reviewed below (Please see scanned pedigree for detailed family  history information)  Sister with a history of lymphocytic colitis.  Mother with a history of gallbladder polyps and colon polyps (<10). She also has a history of uterine fibroids.   Two maternal aunt with pre-melanoma. Idalmis reports that they may have a hereditary condition associated with melanoma risk, but she is unsure of the details. She reports limited communication with these aunts.  Paternal grandmother passed in her 40's/50's from esophageal cancer. She did have a history of smoking and alcohol use.   Paternal grandfather's sister with a history of breast cancer and ovarian cancer diagnosed at unknown ages.   Paternal grandfather's sister with a history of a brain cancer (unknown type) reportedly discovered on autopsy.   Paternal grandfather's sister with a history of stomach cancer diagnosed at an unknown age.   There is no known Ashkenazi Orthodoxy ancestry on either side of her family. There is no reported consanguinity.    Discussion:  Idalmis's family history of cancer is suggestive of a hereditary cancer syndrome.  We reviewed the features of sporadic, familial, and hereditary cancers. In looking at Idalmis's family history, it is possible that a cancer susceptibility gene is present due to her paternal great aunts' histories of breast and ovarian cancer.  We discussed the natural history and genetics of hereditary cancers. A detailed handout regarding the information we discussed will be sent to Idalmis via [employer]. Topics included: inheritance pattern, cancer risks, cancer screening recommendations, and also risks, benefits and limitations of testing.  We reviewed that the most common cause of hereditary breast cancer is Hereditary Breast and Ovarian Cancer (HBOC) syndrome, which is caused by mutations in the genes BRCA1 and BRCA2. Women with a mutation in either of these genes are at increased risk for breast and ovarian cancer. There is also an increased risk for a second primary breast cancer for  women. Men with a mutation in either BRCA1 or BRCA2 are at increased risk for male breast and prostate cancer. Both women and men may also be at increased risk for pancreatic cancer and melanoma.   We also discussed that it is always most informative to test the people in the family who have had the cancers concerning for a hereditary component or the people most closely related to those individuals. For Idalmis's family, this would be her paternal great aunts or her paternal grandfather. Unfortunately all of those individuals have passed and cannot be tested. Her father would be the next best person to testing, however, he is unable/unwilling to complete genetic testing. Therefore Idalmis is the most appropriate person to test to assess her cancer risk. We reviewed the implications of interpreting a negative genetic test results in an unaffected person. Idalmis expressed understanding and the desire to move forward with genetic testing for herself.  We discussed that there are additional genes that could cause increased risk for breast, ovarian, and related cancers. As many of these genes present with overlapping features in a family and accurate cancer risk cannot always be established based upon the pedigree analysis alone, it would be reasonable for Idalmis to consider panel genetic testing to analyze multiple genes at once.  We reviewed genetic testing options given Idalmis's family history including targeted and expanded options. After our discussion, Idalmis opted to proceed with genetic testing via the Multi-Cancer panel through [facility].   Medical Management: For Idalmis, we reviewed that the information from genetic testing may determine:  additional cancer screening for which Idalmis may qualify (i.e. mammogram and breast MRI, more frequent colonoscopies, more frequent dermatologic exams, etc.),  options for risk reducing surgeries Idalmis could consider (i.e. bilateral mastectomy, surgery to remove her ovaries  and/or uterus, etc.),    and targeted chemotherapies for Idalmis if she were to develop certain cancers in the future (i.e. immunotherapy for individuals with Garcias syndrome, PARP inhibitors, etc.).   The possible outcomes of genetic testing including positive, negative, and uncertain were discussed with Idalmis. A handout that explains this in detail was provided to the patient.  Verbal consent obtained over video today with no witness required.   These recommendations will be discussed in detail once genetic testing is completed.   Idalmis will be contacted to schedule a lab only appointment at any [facility] clinic at her earliest convenience.     I spent 75 minutes on the date of the encounter doing chart review, history and exam, documentation and further activities as noted above.    Plan:  1) Today Idalmis elected to proceed with Multi-Cancer panel genetic testing through [facility].  2) This information should be available in approximately 3 weeks from the time the lab receives the sample.  3) Idalmis will be contacted by our scheduling department to set up a virtual result disclosure appointment.     Jazzy Swann MS, [facility]  Licensed, Certified Genetic Counselor  [facility]  [email or web address]        Again, thank you for allowing me to participate in the care of your patient.        Sincerely,        Jazzy De Anda GC    Electronically signed

## 2025-07-23 NOTE — NURSING NOTE
Current patient location: [address]  [facility] [facility] [place] [address]      Is the patient currently in the state of [place]? YES    Visit mode:VIDEO    If the visit is dropped, the patient can be reconnected by: VIDEO VISIT: Send to e-mail at: [email or web address]    Will anyone else be joining the visit? NO  (If patient encounters technical issues they should call [phone]    How would you like to obtain your AVS? MyChart    Are changes needed to the allergy or medication list? N/A    Reason for visit: Consult      Jen GARCES

## 2025-07-24 ENCOUNTER — LAB (OUTPATIENT)
Dept: LAB | Facility: CLINIC | Age: 25
End: 2025-07-24
Payer: COMMERCIAL

## 2025-07-24 DIAGNOSIS — Z80.0 FAMILY HISTORY OF STOMACH CANCER: ICD-10-CM

## 2025-07-24 DIAGNOSIS — Z80.41 FAMILY HISTORY OF MALIGNANT NEOPLASM OF OVARY: ICD-10-CM

## 2025-07-24 DIAGNOSIS — Z80.3 FAMILY HISTORY OF MALIGNANT NEOPLASM OF BREAST: ICD-10-CM

## 2025-07-24 PROCEDURE — 99000 SPECIMEN HANDLING OFFICE-LAB: CPT | Performed by: PATHOLOGY

## 2025-07-30 LAB
SCANNED LAB RESULT: NORMAL
TEST NAME: NORMAL

## 2025-08-05 ENCOUNTER — OFFICE VISIT (OUTPATIENT)
Dept: ORTHOPEDICS | Facility: CLINIC | Age: 25
End: 2025-08-05
Payer: COMMERCIAL

## 2025-08-05 DIAGNOSIS — M67.431 GANGLION OF RIGHT WRIST: Primary | ICD-10-CM

## 2025-08-05 PROCEDURE — 99024 POSTOP FOLLOW-UP VISIT: CPT | Performed by: STUDENT IN AN ORGANIZED HEALTH CARE EDUCATION/TRAINING PROGRAM

## 2025-08-19 ENCOUNTER — THERAPY VISIT (OUTPATIENT)
Dept: OCCUPATIONAL THERAPY | Facility: CLINIC | Age: 25
End: 2025-08-19
Payer: COMMERCIAL

## 2025-08-19 DIAGNOSIS — M25.531 RIGHT WRIST PAIN: Primary | ICD-10-CM

## 2025-08-19 PROCEDURE — 97110 THERAPEUTIC EXERCISES: CPT | Mod: GO | Performed by: OCCUPATIONAL THERAPIST

## 2025-09-03 ENCOUNTER — VIRTUAL VISIT (OUTPATIENT)
Dept: ONCOLOGY | Facility: CLINIC | Age: 25
End: 2025-09-03
Attending: GENETIC COUNSELOR, MS
Payer: COMMERCIAL

## 2025-09-03 DIAGNOSIS — Z80.0 FAMILY HISTORY OF STOMACH CANCER: ICD-10-CM

## 2025-09-03 DIAGNOSIS — Z80.3 FAMILY HISTORY OF MALIGNANT NEOPLASM OF BREAST: Primary | ICD-10-CM

## 2025-09-03 DIAGNOSIS — Z80.41 FAMILY HISTORY OF MALIGNANT NEOPLASM OF OVARY: ICD-10-CM

## 2025-09-03 PROCEDURE — 999N000069 HC STATISTIC GENETIC COUNSELING, < 16 MIN: Mod: GT,95 | Performed by: GENETIC COUNSELOR, MS

## (undated) RX ORDER — LIDOCAINE HYDROCHLORIDE 10 MG/ML
INJECTION, SOLUTION EPIDURAL; INFILTRATION; INTRACAUDAL; PERINEURAL
Status: DISPENSED
Start: 2025-01-16

## (undated) RX ORDER — FENTANYL CITRATE 50 UG/ML
INJECTION, SOLUTION INTRAMUSCULAR; INTRAVENOUS
Status: DISPENSED
Start: 2025-05-29

## (undated) RX ORDER — ONDANSETRON 2 MG/ML
INJECTION INTRAMUSCULAR; INTRAVENOUS
Status: DISPENSED
Start: 2025-05-29

## (undated) RX ORDER — ACETAMINOPHEN 325 MG/1
TABLET ORAL
Status: DISPENSED
Start: 2025-05-29

## (undated) RX ORDER — LIDOCAINE HYDROCHLORIDE AND EPINEPHRINE 10; 10 MG/ML; UG/ML
INJECTION, SOLUTION INFILTRATION; PERINEURAL
Status: DISPENSED
Start: 2025-05-29

## (undated) RX ORDER — TRIAMCINOLONE ACETONIDE 40 MG/ML
INJECTION, SUSPENSION INTRA-ARTICULAR; INTRAMUSCULAR
Status: DISPENSED
Start: 2025-01-16

## (undated) RX ORDER — PROPOFOL 10 MG/ML
INJECTION, EMULSION INTRAVENOUS
Status: DISPENSED
Start: 2025-05-29

## (undated) RX ORDER — BUPIVACAINE HYDROCHLORIDE 2.5 MG/ML
INJECTION, SOLUTION EPIDURAL; INFILTRATION; INTRACAUDAL; PERINEURAL
Status: DISPENSED
Start: 2025-05-29